# Patient Record
Sex: FEMALE | Race: BLACK OR AFRICAN AMERICAN | NOT HISPANIC OR LATINO | Employment: OTHER | URBAN - METROPOLITAN AREA
[De-identification: names, ages, dates, MRNs, and addresses within clinical notes are randomized per-mention and may not be internally consistent; named-entity substitution may affect disease eponyms.]

---

## 2023-03-08 LAB
APPEARANCE, URINE: ABNORMAL
BACTERIA, URINE: ABNORMAL /HPF
BILIRUBIN, URINE: NEGATIVE
BLOOD, URINE: ABNORMAL
COLOR, URINE: ABNORMAL
GLUCOSE, URINE: NEGATIVE MG/DL
KETONES, URINE: NEGATIVE MG/DL
LEUKOCYTE ESTERASE, URINE: ABNORMAL
MUCUS, URINE: ABNORMAL /LPF
NITRITE, URINE: NEGATIVE
PH, URINE: 5 (ref 5–8)
PROTEIN, URINE: ABNORMAL MG/DL
RBC, URINE: 45 /HPF (ref 0–5)
SPECIFIC GRAVITY, URINE: 1.02 (ref 1–1.03)
SQUAMOUS EPITHELIAL CELLS, URINE: 1 /HPF
UROBILINOGEN, URINE: <2 MG/DL (ref 0–1.9)
WBC CLUMPS, URINE: ABNORMAL /HPF
WBC, URINE: >182 /HPF (ref 0–5)

## 2023-03-10 LAB — URINE CULTURE: ABNORMAL

## 2023-03-13 DIAGNOSIS — R39.9 URINARY SYMPTOM OR SIGN: ICD-10-CM

## 2023-03-23 DIAGNOSIS — I10 HYPERTENSION, UNSPECIFIED TYPE: Primary | ICD-10-CM

## 2023-03-23 RX ORDER — AMLODIPINE BESYLATE 5 MG/1
TABLET ORAL
Qty: 90 TABLET | Refills: 0 | Status: SHIPPED | OUTPATIENT
Start: 2023-03-23 | End: 2023-03-31 | Stop reason: SDUPTHER

## 2023-03-31 DIAGNOSIS — I10 HYPERTENSION, UNSPECIFIED TYPE: ICD-10-CM

## 2023-03-31 RX ORDER — ATORVASTATIN CALCIUM 40 MG/1
40 TABLET, FILM COATED ORAL NIGHTLY
COMMUNITY
End: 2023-03-31 | Stop reason: SDUPTHER

## 2023-03-31 RX ORDER — ATORVASTATIN CALCIUM 40 MG/1
40 TABLET, FILM COATED ORAL NIGHTLY
Qty: 90 TABLET | Refills: 0 | Status: SHIPPED | OUTPATIENT
Start: 2023-03-31 | End: 2023-06-06

## 2023-03-31 RX ORDER — AMLODIPINE BESYLATE 5 MG/1
TABLET ORAL
Qty: 90 TABLET | Refills: 0 | Status: SHIPPED | OUTPATIENT
Start: 2023-03-31 | End: 2023-08-10 | Stop reason: ALTCHOICE

## 2023-04-27 PROBLEM — G95.9 CERVICAL MYELOPATHY (MULTI): Status: ACTIVE | Noted: 2023-04-27

## 2023-04-27 PROBLEM — K80.20 SYMPTOMATIC CHOLELITHIASIS: Status: ACTIVE | Noted: 2023-04-27

## 2023-04-27 PROBLEM — D64.9 ABSOLUTE ANEMIA: Status: ACTIVE | Noted: 2023-04-27

## 2023-04-27 PROBLEM — R01.1 HEART MURMUR: Status: ACTIVE | Noted: 2023-04-27

## 2023-04-27 PROBLEM — K91.2 POSTSURGICAL MALABSORPTION (HHS-HCC): Status: ACTIVE | Noted: 2023-04-27

## 2023-04-27 PROBLEM — M54.2 NECK PAIN: Status: ACTIVE | Noted: 2023-04-27

## 2023-04-27 PROBLEM — H33.103 BILATERAL RETINOSCHISIS: Status: ACTIVE | Noted: 2023-04-27

## 2023-04-27 PROBLEM — Z98.84 GASTRIC BYPASS STATUS FOR OBESITY: Status: ACTIVE | Noted: 2023-04-27

## 2023-04-27 PROBLEM — K21.9 GASTRIC REFLUX: Status: ACTIVE | Noted: 2023-04-27

## 2023-04-27 PROBLEM — R20.2 PARESTHESIA OF LOWER EXTREMITY: Status: ACTIVE | Noted: 2023-04-27

## 2023-04-27 PROBLEM — M48.062 LUMBAR STENOSIS WITH NEUROGENIC CLAUDICATION: Status: ACTIVE | Noted: 2023-04-27

## 2023-04-27 PROBLEM — N18.30 CHRONIC RENAL DISEASE, STAGE III (MULTI): Status: ACTIVE | Noted: 2023-04-27

## 2023-04-27 PROBLEM — I10 HYPERTENSION: Status: ACTIVE | Noted: 2023-04-27

## 2023-04-27 PROBLEM — M17.12 ARTHRITIS OF LEFT KNEE: Status: ACTIVE | Noted: 2023-04-27

## 2023-04-27 PROBLEM — R10.13 DYSPEPSIA: Status: ACTIVE | Noted: 2023-04-27

## 2023-04-27 PROBLEM — G95.9: Status: ACTIVE | Noted: 2023-04-27

## 2023-04-27 PROBLEM — N20.0 CALCULUS OF KIDNEY: Status: ACTIVE | Noted: 2023-04-27

## 2023-04-27 PROBLEM — G56.00 CARPAL TUNNEL SYNDROME: Status: ACTIVE | Noted: 2023-04-27

## 2023-04-27 PROBLEM — M85.80 OSTEOPENIA: Status: ACTIVE | Noted: 2023-04-27

## 2023-04-27 PROBLEM — F32.A DEPRESSION: Status: ACTIVE | Noted: 2023-04-27

## 2023-04-27 PROBLEM — M17.9 OSTEOARTHRITIS OF KNEE: Status: ACTIVE | Noted: 2023-04-27

## 2023-04-27 PROBLEM — N32.81 OAB (OVERACTIVE BLADDER): Status: ACTIVE | Noted: 2023-04-27

## 2023-04-27 PROBLEM — R10.11 CHRONIC RIGHT UPPER QUADRANT PAIN: Status: ACTIVE | Noted: 2023-04-27

## 2023-04-27 PROBLEM — R63.5 WEIGHT GAIN FOLLOWING GASTRIC BYPASS SURGERY: Status: ACTIVE | Noted: 2023-04-27

## 2023-04-27 PROBLEM — K63.5 BENIGN COLON POLYP: Status: ACTIVE | Noted: 2023-04-27

## 2023-04-27 PROBLEM — H10.13 ALLERGIC CONJUNCTIVITIS OF BOTH EYES: Status: ACTIVE | Noted: 2023-04-27

## 2023-04-27 PROBLEM — M43.10 ACQUIRED SPONDYLOLISTHESIS: Status: ACTIVE | Noted: 2023-04-27

## 2023-04-27 PROBLEM — Z96.1 PSEUDOPHAKIA OF BOTH EYES: Status: ACTIVE | Noted: 2023-04-27

## 2023-04-27 PROBLEM — M48.00 SPINAL STENOSIS: Status: ACTIVE | Noted: 2023-04-27

## 2023-04-27 PROBLEM — N39.41 URGE INCONTINENCE OF URINE: Status: ACTIVE | Noted: 2023-04-27

## 2023-04-27 PROBLEM — N81.89 PELVIC FLOOR WEAKNESS: Status: ACTIVE | Noted: 2023-04-27

## 2023-04-27 PROBLEM — Z98.84 WEIGHT GAIN FOLLOWING GASTRIC BYPASS SURGERY: Status: ACTIVE | Noted: 2023-04-27

## 2023-04-27 PROBLEM — E05.00 GOITER WITH HYPERTHYROIDISM: Status: ACTIVE | Noted: 2023-04-27

## 2023-04-27 PROBLEM — E55.9 VITAMIN D DEFICIENCY: Status: ACTIVE | Noted: 2023-04-27

## 2023-04-27 PROBLEM — D50.9 IRON DEFICIENCY ANEMIA: Status: ACTIVE | Noted: 2023-04-27

## 2023-04-27 PROBLEM — R53.83 FATIGUE: Status: ACTIVE | Noted: 2023-04-27

## 2023-04-27 PROBLEM — R73.9 BORDERLINE HYPERGLYCEMIA: Status: ACTIVE | Noted: 2023-04-27

## 2023-04-27 PROBLEM — Z98.1 STATUS POST LUMBAR SPINAL FUSION: Status: ACTIVE | Noted: 2023-04-27

## 2023-04-27 PROBLEM — N95.2 VAGINAL ATROPHY: Status: ACTIVE | Noted: 2023-04-27

## 2023-04-27 PROBLEM — F32.9 MAJOR DEPRESSIVE DISORDER WITH SINGLE EPISODE: Status: ACTIVE | Noted: 2023-04-27

## 2023-04-27 PROBLEM — M54.50 LOW BACK PAIN: Status: ACTIVE | Noted: 2023-04-27

## 2023-04-27 PROBLEM — M54.16 CHRONIC LUMBAR RADICULOPATHY: Status: ACTIVE | Noted: 2023-04-27

## 2023-04-27 PROBLEM — R10.13 ABDOMINAL PAIN, EPIGASTRIC: Status: ACTIVE | Noted: 2023-04-27

## 2023-04-27 PROBLEM — M81.0 OSTEOPOROSIS: Status: ACTIVE | Noted: 2023-04-27

## 2023-04-27 PROBLEM — E78.5 DYSLIPIDEMIA: Status: ACTIVE | Noted: 2023-04-27

## 2023-04-27 PROBLEM — N39.0 RECURRENT UTI: Status: ACTIVE | Noted: 2023-04-27

## 2023-04-27 PROBLEM — G89.29 CHRONIC RIGHT UPPER QUADRANT PAIN: Status: ACTIVE | Noted: 2023-04-27

## 2023-04-27 PROBLEM — R20.0 HAND NUMBNESS: Status: ACTIVE | Noted: 2023-04-27

## 2023-04-27 PROBLEM — T50.905A DRUG REACTION: Status: ACTIVE | Noted: 2023-04-27

## 2023-04-27 PROBLEM — E66.01 MORBID OBESITY (MULTI): Status: ACTIVE | Noted: 2023-04-27

## 2023-04-27 PROBLEM — M25.562 LEFT KNEE PAIN: Status: ACTIVE | Noted: 2023-04-27

## 2023-04-27 RX ORDER — L. ACIDOPHILUS/L.BULGARICUS 1MM CELL
TABLET ORAL 2 TIMES WEEKLY
COMMUNITY
Start: 2022-08-26

## 2023-04-27 RX ORDER — ESTRADIOL 0.1 MG/G
CREAM VAGINAL AS NEEDED
COMMUNITY
Start: 2022-04-28 | End: 2024-02-13 | Stop reason: WASHOUT

## 2023-04-27 RX ORDER — OMEPRAZOLE 20 MG/1
1 CAPSULE, DELAYED RELEASE ORAL DAILY
COMMUNITY
Start: 2023-01-04

## 2023-04-27 RX ORDER — AMLODIPINE BESYLATE 10 MG/1
10 TABLET ORAL DAILY
COMMUNITY
End: 2023-11-08 | Stop reason: SDUPTHER

## 2023-04-27 RX ORDER — AZELASTINE HYDROCHLORIDE 0.5 MG/ML
1 SOLUTION/ DROPS OPHTHALMIC AS NEEDED
COMMUNITY
Start: 2023-02-27 | End: 2023-11-27 | Stop reason: SDUPTHER

## 2023-04-27 RX ORDER — METOPROLOL TARTRATE 75 MG/1
75 TABLET, FILM COATED ORAL DAILY
COMMUNITY
End: 2023-05-01 | Stop reason: SDUPTHER

## 2023-04-27 RX ORDER — DULOXETIN HYDROCHLORIDE 60 MG/1
120 CAPSULE, DELAYED RELEASE ORAL DAILY
COMMUNITY
End: 2023-06-07 | Stop reason: SDUPTHER

## 2023-04-27 RX ORDER — GUAIFENESIN AND PHENYLEPHRINE HCL 400; 10 MG/1; MG/1
1 TABLET ORAL DAILY
COMMUNITY
Start: 2020-06-05

## 2023-04-27 RX ORDER — FERROUS SULFATE 325(65) MG
1 TABLET ORAL 2 TIMES WEEKLY
COMMUNITY

## 2023-04-27 RX ORDER — CHOLECALCIFEROL (VITAMIN D3) 25 MCG
25 TABLET ORAL DAILY
COMMUNITY

## 2023-05-01 ENCOUNTER — OFFICE VISIT (OUTPATIENT)
Dept: PRIMARY CARE | Facility: CLINIC | Age: 73
End: 2023-05-01
Payer: MEDICARE

## 2023-05-01 ENCOUNTER — LAB (OUTPATIENT)
Dept: LAB | Facility: LAB | Age: 73
End: 2023-05-01
Payer: MEDICARE

## 2023-05-01 VITALS
TEMPERATURE: 96.7 F | BODY MASS INDEX: 46.09 KG/M2 | SYSTOLIC BLOOD PRESSURE: 120 MMHG | WEIGHT: 252 LBS | DIASTOLIC BLOOD PRESSURE: 74 MMHG

## 2023-05-01 DIAGNOSIS — N39.0 RECURRENT UTI: Primary | ICD-10-CM

## 2023-05-01 DIAGNOSIS — M17.0 OSTEOARTHRITIS OF BOTH KNEES, UNSPECIFIED OSTEOARTHRITIS TYPE: Primary | ICD-10-CM

## 2023-05-01 DIAGNOSIS — E55.9 VITAMIN D DEFICIENCY: ICD-10-CM

## 2023-05-01 DIAGNOSIS — I10 PRIMARY HYPERTENSION: ICD-10-CM

## 2023-05-01 DIAGNOSIS — N39.0 RECURRENT UTI: ICD-10-CM

## 2023-05-01 DIAGNOSIS — M17.0 OSTEOARTHRITIS OF BOTH KNEES, UNSPECIFIED OSTEOARTHRITIS TYPE: ICD-10-CM

## 2023-05-01 LAB
ALANINE AMINOTRANSFERASE (SGPT) (U/L) IN SER/PLAS: 20 U/L (ref 7–45)
ALBUMIN (G/DL) IN SER/PLAS: 4 G/DL (ref 3.4–5)
ALKALINE PHOSPHATASE (U/L) IN SER/PLAS: 130 U/L (ref 33–136)
ANION GAP IN SER/PLAS: 11 MMOL/L (ref 10–20)
APPEARANCE, URINE: ABNORMAL
ASPARTATE AMINOTRANSFERASE (SGOT) (U/L) IN SER/PLAS: 23 U/L (ref 9–39)
BACTERIA, URINE: ABNORMAL /HPF
BILIRUBIN TOTAL (MG/DL) IN SER/PLAS: 0.7 MG/DL (ref 0–1.2)
BILIRUBIN, URINE: NEGATIVE
BLOOD, URINE: ABNORMAL
CALCIDIOL (25 OH VITAMIN D3) (NG/ML) IN SER/PLAS: 38 NG/ML
CALCIUM (MG/DL) IN SER/PLAS: 9.5 MG/DL (ref 8.6–10.6)
CARBON DIOXIDE, TOTAL (MMOL/L) IN SER/PLAS: 28 MMOL/L (ref 21–32)
CHLORIDE (MMOL/L) IN SER/PLAS: 108 MMOL/L (ref 98–107)
CHOLESTEROL (MG/DL) IN SER/PLAS: 134 MG/DL (ref 0–199)
CHOLESTEROL IN HDL (MG/DL) IN SER/PLAS: 61.3 MG/DL
CHOLESTEROL/HDL RATIO: 2.2
COLOR, URINE: YELLOW
CREATININE (MG/DL) IN SER/PLAS: 0.91 MG/DL (ref 0.5–1.05)
ERYTHROCYTE DISTRIBUTION WIDTH (RATIO) BY AUTOMATED COUNT: 14.2 % (ref 11.5–14.5)
ERYTHROCYTE MEAN CORPUSCULAR HEMOGLOBIN CONCENTRATION (G/DL) BY AUTOMATED: 31 G/DL (ref 32–36)
ERYTHROCYTE MEAN CORPUSCULAR VOLUME (FL) BY AUTOMATED COUNT: 88 FL (ref 80–100)
ERYTHROCYTES (10*6/UL) IN BLOOD BY AUTOMATED COUNT: 4.5 X10E12/L (ref 4–5.2)
ESTIMATED AVERAGE GLUCOSE FOR HBA1C: 126 MG/DL
GFR FEMALE: 67 ML/MIN/1.73M2
GLUCOSE (MG/DL) IN SER/PLAS: 100 MG/DL (ref 74–99)
GLUCOSE, URINE: NEGATIVE MG/DL
HEMATOCRIT (%) IN BLOOD BY AUTOMATED COUNT: 39.7 % (ref 36–46)
HEMOGLOBIN (G/DL) IN BLOOD: 12.3 G/DL (ref 12–16)
HEMOGLOBIN A1C/HEMOGLOBIN TOTAL IN BLOOD: 6 %
KETONES, URINE: NEGATIVE MG/DL
LDL: 58 MG/DL (ref 0–99)
LEUKOCYTE ESTERASE, URINE: ABNORMAL
LEUKOCYTES (10*3/UL) IN BLOOD BY AUTOMATED COUNT: 7.3 X10E9/L (ref 4.4–11.3)
NITRITE, URINE: NEGATIVE
NRBC (PER 100 WBCS) BY AUTOMATED COUNT: 0 /100 WBC (ref 0–0)
PH, URINE: 5 (ref 5–8)
PLATELETS (10*3/UL) IN BLOOD AUTOMATED COUNT: 266 X10E9/L (ref 150–450)
POTASSIUM (MMOL/L) IN SER/PLAS: 4.2 MMOL/L (ref 3.5–5.3)
PROTEIN TOTAL: 7.9 G/DL (ref 6.4–8.2)
PROTEIN, URINE: ABNORMAL MG/DL
RBC, URINE: 55 /HPF (ref 0–5)
SODIUM (MMOL/L) IN SER/PLAS: 143 MMOL/L (ref 136–145)
SPECIFIC GRAVITY, URINE: 1.02 (ref 1–1.03)
SQUAMOUS EPITHELIAL CELLS, URINE: 3 /HPF
THYROTROPIN (MIU/L) IN SER/PLAS BY DETECTION LIMIT <= 0.05 MIU/L: 0.49 MIU/L (ref 0.44–3.98)
TRIGLYCERIDE (MG/DL) IN SER/PLAS: 75 MG/DL (ref 0–149)
UREA NITROGEN (MG/DL) IN SER/PLAS: 19 MG/DL (ref 6–23)
UROBILINOGEN, URINE: <2 MG/DL (ref 0–1.9)
VLDL: 15 MG/DL (ref 0–40)
WBC CLUMPS, URINE: ABNORMAL /HPF
WBC, URINE: 633 /HPF (ref 0–5)

## 2023-05-01 PROCEDURE — 99214 OFFICE O/P EST MOD 30 MIN: CPT | Performed by: INTERNAL MEDICINE

## 2023-05-01 PROCEDURE — 81001 URINALYSIS AUTO W/SCOPE: CPT

## 2023-05-01 PROCEDURE — G0439 PPPS, SUBSEQ VISIT: HCPCS | Performed by: INTERNAL MEDICINE

## 2023-05-01 PROCEDURE — 3078F DIAST BP <80 MM HG: CPT | Performed by: INTERNAL MEDICINE

## 2023-05-01 PROCEDURE — 3074F SYST BP LT 130 MM HG: CPT | Performed by: INTERNAL MEDICINE

## 2023-05-01 PROCEDURE — 83036 HEMOGLOBIN GLYCOSYLATED A1C: CPT

## 2023-05-01 PROCEDURE — 85027 COMPLETE CBC AUTOMATED: CPT

## 2023-05-01 PROCEDURE — 82306 VITAMIN D 25 HYDROXY: CPT

## 2023-05-01 PROCEDURE — 80061 LIPID PANEL: CPT

## 2023-05-01 PROCEDURE — 36415 COLL VENOUS BLD VENIPUNCTURE: CPT

## 2023-05-01 PROCEDURE — 84443 ASSAY THYROID STIM HORMONE: CPT

## 2023-05-01 PROCEDURE — 80053 COMPREHEN METABOLIC PANEL: CPT

## 2023-05-01 RX ORDER — MELOXICAM 15 MG/1
15 TABLET ORAL DAILY
Qty: 30 TABLET | Refills: 11 | Status: SHIPPED | OUTPATIENT
Start: 2023-05-01 | End: 2023-05-01 | Stop reason: ALTCHOICE

## 2023-05-01 RX ORDER — MELOXICAM 15 MG/1
15 TABLET ORAL DAILY
Qty: 30 TABLET | Refills: 11 | Status: SHIPPED | OUTPATIENT
Start: 2023-05-01 | End: 2024-02-28 | Stop reason: SDUPTHER

## 2023-05-01 RX ORDER — METOPROLOL TARTRATE 75 MG/1
75 TABLET, FILM COATED ORAL DAILY
Qty: 90 TABLET | Refills: 1 | Status: SHIPPED | OUTPATIENT
Start: 2023-05-01 | End: 2024-02-15 | Stop reason: SDUPTHER

## 2023-05-01 ASSESSMENT — PATIENT HEALTH QUESTIONNAIRE - PHQ9
SUM OF ALL RESPONSES TO PHQ9 QUESTIONS 1 AND 2: 0
1. LITTLE INTEREST OR PLEASURE IN DOING THINGS: NOT AT ALL
2. FEELING DOWN, DEPRESSED OR HOPELESS: NOT AT ALL

## 2023-05-01 NOTE — PROGRESS NOTES
Subjective   Patient ID: Sarita Morelos is a 73 y.o. female who presents for annual wellness visit, follow up.      HPI   Patient complaints today on low back, knees pain which is chronic, and  1 months of a new onset of wrists pain, R>L ( uses right hand to operate with cane). She fills anxious about her son's wedding and for this reason traveling to [place] tomorrow for the following 3 months.   She states that her abdominal sensitivity became betted in comparison with the last year.   She states that her weight increased for 20 lbs for the last year, mainly because of carb diet and low exercises activity. She denies any SOB, CP, OP, PND, abdominal pain, N/V, dysuria and bowel habitus change.   On review, was noted that the patient denies Hx of  falls,  perform ADLs, and fills depressed( her PQH score is 10). She is up to dates vaccinated, and her colonoscopy due on 2023, and mammogramm is planning on 9/2023. Her labs was ordered.     Review of Systems  12 ROS is  reviewed and all are negative except as  is mentioned above.     Objective   /74   Temp 35.9 °C (96.7 °F)   Wt 114 kg (252 lb)   BMI 46.09 kg/m²     Physical Exam  General: Obese patient, Not confused, lying in the bed comfortably; NAD  HEENT: no lesions, no scleral icterus, moist mucous membranes  Neuro: A&Ox3, no focal neural deficits noted  CV: RRR, nrl S1, S2, no murmur, rubs, or clicks  Resp: bilateral air entry, No wheezing, crackles, or rhonchi  Abdomen: Non distended, + BS, soft, non-tender, no peritoneal signs  Extremities: No LL Edema, + PP  Skin: No jaundice, no lesions  Psychiatric: Appropriate mood and affect. Calm.    Assessment/Plan           SARITA SMITH is a 72 year old female with PMHx significant for HTN, HLD, Chronic Back/Knee Pain, remote Hx SBO in 6/2022, and depression who presents to the clinic today for a annual wellness visit and regular follow up.      #Pain in wrist, R>L  #A new onset  of OA of both wrists  -Ordered UA, CBC, CMP, LP, TSH, Hb A1C, VIT D  -Provided referral to PT, XRAY right wrist, ordered Meloxicam 15 mg daily for 10 days  -Tylenol PRN for pain    #MDD  -PQS is 10 points  -Pt on Duloxetin 120 mg daily  -Provided referral to psychyatry      #Chronic UTIs?  -Currently asymptomatic       #HTN  - BP in office 132/80, patient states BP at home has been in 120-130's  - Current Home Regimen: Amlodipine 10 mg, metoprolol tartrate 75mg BID, pt reports good results with this medication   - Advised patient to continue current regimen and monitoring BP frequently  -Refills for Metoprolol provided      #HLD  - Atorvastatin 40 mg with 2 refills      #Health Maintenance  - Mammogram: 8/22   - DEXA 8/22 normal   - colonoscopy in June 2018 had no polyps , due in 2023             Follow up in 3 months.

## 2023-05-01 NOTE — PROGRESS NOTES
I reviewed with the resident the medical history and the resident’s findings on physical examination.  I discussed with the resident the patient’s diagnosis and concur with the treatment plan as documented in the resident note.     I saw and evaluated the patient. I personally obtained the key and critical portions of the history and physical exam or was physically present for key and critical portions performed by the trainee. I reviewed the trainee's documentation and discussed the patient with the trainee. I agree with the trainee's medical decision making, as documented on the trainee's note.     Marilee Hodges MD

## 2023-05-02 RX ORDER — CIPROFLOXACIN 500 MG/1
500 TABLET ORAL 2 TIMES DAILY
Qty: 10 TABLET | Refills: 0 | Status: SHIPPED | OUTPATIENT
Start: 2023-05-02 | End: 2023-05-07

## 2023-05-03 DIAGNOSIS — N39.0 RECURRENT UTI: Primary | ICD-10-CM

## 2023-05-03 RX ORDER — CIPROFLOXACIN 500 MG/1
500 TABLET ORAL 2 TIMES DAILY
Qty: 10 TABLET | Refills: 0 | Status: SHIPPED | OUTPATIENT
Start: 2023-05-03 | End: 2023-05-08

## 2023-05-03 NOTE — RESULT ENCOUNTER NOTE
Left a message with the patient to start oral antibiotics.  Recheck UA and culture in 2 weeks.  If urine still dirty, I will refer to urology.

## 2023-06-06 DIAGNOSIS — I10 HYPERTENSION, UNSPECIFIED TYPE: ICD-10-CM

## 2023-06-06 DIAGNOSIS — E78.5 DYSLIPIDEMIA: ICD-10-CM

## 2023-06-06 RX ORDER — ATORVASTATIN CALCIUM 40 MG/1
40 TABLET, FILM COATED ORAL NIGHTLY
Qty: 90 TABLET | Refills: 0 | Status: SHIPPED | OUTPATIENT
Start: 2023-06-06 | End: 2023-09-20

## 2023-06-07 DIAGNOSIS — F32.A DEPRESSION, UNSPECIFIED DEPRESSION TYPE: ICD-10-CM

## 2023-06-07 RX ORDER — DULOXETIN HYDROCHLORIDE 60 MG/1
120 CAPSULE, DELAYED RELEASE ORAL DAILY
Qty: 180 CAPSULE | Refills: 0 | Status: SHIPPED | OUTPATIENT
Start: 2023-06-07 | End: 2024-01-09

## 2023-08-08 ENCOUNTER — LAB (OUTPATIENT)
Dept: LAB | Facility: LAB | Age: 73
End: 2023-08-08
Payer: MEDICARE

## 2023-08-08 DIAGNOSIS — R39.9 URINARY SYMPTOM OR SIGN: ICD-10-CM

## 2023-08-08 DIAGNOSIS — N39.0 RECURRENT UTI: ICD-10-CM

## 2023-08-08 PROCEDURE — 87077 CULTURE AEROBIC IDENTIFY: CPT

## 2023-08-08 PROCEDURE — 81001 URINALYSIS AUTO W/SCOPE: CPT

## 2023-08-08 PROCEDURE — 87186 SC STD MICRODIL/AGAR DIL: CPT

## 2023-08-08 PROCEDURE — 87086 URINE CULTURE/COLONY COUNT: CPT

## 2023-08-09 LAB
APPEARANCE, URINE: ABNORMAL
BILIRUBIN, URINE: NEGATIVE
BLOOD, URINE: ABNORMAL
COLOR, URINE: ABNORMAL
GLUCOSE, URINE: NEGATIVE MG/DL
KETONES, URINE: NEGATIVE MG/DL
LEUKOCYTE ESTERASE, URINE: ABNORMAL
MUCUS, URINE: ABNORMAL /LPF
NITRITE, URINE: NEGATIVE
PH, URINE: 5 (ref 5–8)
PROTEIN, URINE: ABNORMAL MG/DL
RBC, URINE: 149 /HPF (ref 0–5)
SPECIFIC GRAVITY, URINE: 1.02 (ref 1–1.03)
SQUAMOUS EPITHELIAL CELLS, URINE: 17 /HPF
UROBILINOGEN, URINE: <2 MG/DL (ref 0–1.9)
WBC CLUMPS, URINE: ABNORMAL /HPF
WBC, URINE: >182 /HPF (ref 0–5)

## 2023-08-10 ENCOUNTER — OFFICE VISIT (OUTPATIENT)
Dept: PRIMARY CARE | Facility: CLINIC | Age: 73
End: 2023-08-10
Payer: MEDICARE

## 2023-08-10 VITALS — SYSTOLIC BLOOD PRESSURE: 130 MMHG | WEIGHT: 257 LBS | DIASTOLIC BLOOD PRESSURE: 80 MMHG | BODY MASS INDEX: 47.01 KG/M2

## 2023-08-10 DIAGNOSIS — E78.5 DYSLIPIDEMIA: Primary | ICD-10-CM

## 2023-08-10 DIAGNOSIS — E66.01 MORBID OBESITY (MULTI): ICD-10-CM

## 2023-08-10 DIAGNOSIS — Z98.84 GASTRIC BYPASS STATUS FOR OBESITY: ICD-10-CM

## 2023-08-10 DIAGNOSIS — R73.03 PREDIABETES: ICD-10-CM

## 2023-08-10 DIAGNOSIS — I10 HYPERTENSION, UNSPECIFIED TYPE: ICD-10-CM

## 2023-08-10 DIAGNOSIS — G95.9 CERVICAL MYELOPATHY (MULTI): ICD-10-CM

## 2023-08-10 DIAGNOSIS — N39.0 RECURRENT UTI: ICD-10-CM

## 2023-08-10 DIAGNOSIS — Z00.00 ANNUAL PHYSICAL EXAM: ICD-10-CM

## 2023-08-10 PROCEDURE — 1126F AMNT PAIN NOTED NONE PRSNT: CPT | Performed by: INTERNAL MEDICINE

## 2023-08-10 PROCEDURE — 3079F DIAST BP 80-89 MM HG: CPT | Performed by: INTERNAL MEDICINE

## 2023-08-10 PROCEDURE — 3075F SYST BP GE 130 - 139MM HG: CPT | Performed by: INTERNAL MEDICINE

## 2023-08-10 PROCEDURE — 1159F MED LIST DOCD IN RCRD: CPT | Performed by: INTERNAL MEDICINE

## 2023-08-10 PROCEDURE — 99214 OFFICE O/P EST MOD 30 MIN: CPT | Performed by: INTERNAL MEDICINE

## 2023-08-10 RX ORDER — CIPROFLOXACIN 500 MG/1
500 TABLET ORAL 2 TIMES DAILY
Qty: 10 TABLET | Refills: 0 | Status: SHIPPED | OUTPATIENT
Start: 2023-08-10 | End: 2023-08-15

## 2023-08-10 ASSESSMENT — ENCOUNTER SYMPTOMS
DYSURIA: 1
RESPIRATORY NEGATIVE: 1
GASTROINTESTINAL NEGATIVE: 1
CONSTITUTIONAL NEGATIVE: 1
CARDIOVASCULAR NEGATIVE: 1

## 2023-08-10 ASSESSMENT — PATIENT HEALTH QUESTIONNAIRE - PHQ9
SUM OF ALL RESPONSES TO PHQ9 QUESTIONS 1 AND 2: 2
2. FEELING DOWN, DEPRESSED OR HOPELESS: SEVERAL DAYS
1. LITTLE INTEREST OR PLEASURE IN DOING THINGS: SEVERAL DAYS

## 2023-08-10 NOTE — PROGRESS NOTES
Subjective   Patient ID: Sarita Morelos is a 73 y.o. female who presents for Follow-up.  HPI    Review of Systems   Constitutional: Negative.    Respiratory: Negative.     Cardiovascular: Negative.    Gastrointestinal: Negative.    Genitourinary:  Positive for dysuria.       Objective   Physical Exam  Neurological:      Mental Status: She is alert.   Psychiatric:         Mood and Affect: Mood normal.         Assessment/Plan   Problem List Items Addressed This Visit       Dyslipidemia - Primary    Gastric bypass status for obesity    Hypertension    Morbid obesity (CMS/HCC)    Relevant Medications    semaglutide 0.25 mg or 0.5 mg (2 mg/3 mL) pen injector    semaglutide 0.25 mg or 0.5 mg (2 mg/3 mL) pen injector    Recurrent UTI    Relevant Medications    ciprofloxacin (Cipro) 500 mg tablet    Prediabetes    Relevant Medications    semaglutide 0.25 mg or 0.5 mg (2 mg/3 mL) pen injector    semaglutide 0.25 mg or 0.5 mg (2 mg/3 mL) pen injector     Other Visit Diagnoses       Annual physical exam        Relevant Orders    BI mammo bilateral screening tomosynthesis          Uti  Start Cipro BID for 5 days  Let us know if not better in 1 week  Drink more water      Obesity with BMI and comorbidities as noted above.  No signs of hypothyroidism, no signs of hypercortisolism.  Contraindications to weight loss: none.  Patient readiness to commit to diet and activity changes: good.  Barriers to weight loss: social factors  The patient is here for obesity.  She has been unable to lose weight despite trials of diet changes and exercise programs.  Starting Ozempic, if covered by insurance  Start with 0.25 weekly  Increase to 0.5 in 4 weeks    Follow up in 3 months    Lab Results   Component Value Date    WBC 7.3 05/01/2023    HGB 12.3 05/01/2023    HCT 39.7 05/01/2023     05/01/2023    CHOL 134 05/01/2023    TRIG 75 05/01/2023    HDL 61.3 05/01/2023    ALT 20 05/01/2023    AST 23 05/01/2023      05/01/2023    K 4.2 05/01/2023     (H) 05/01/2023    CREATININE 0.91 05/01/2023    BUN 19 05/01/2023    CO2 28 05/01/2023    TSH 0.49 05/01/2023    INR 1.1 07/23/2019    HGBA1C 6.0 (A) 05/01/2023     par    MDM  1) COMPLEXITY: MORE THAN 1 STABLE CHRONIC CONDITION ADDRESSED OR 1 ACUTE ILLNESS ADDRESSED   2)DATA: TESTS INTERPRETED AND OR ORDERED, TOOK INDEPENDENT HISTORY OR RECORDS REVIEWED  3)RISK: MODERATE RISK DUE TO NATURE OF MEDICAL CONDITIONS/COMORBIDITY OR MEDICATIONS ORDERED OR SURGICAL OR PROCEDURE REFERRAL         Marilee Hodges MD

## 2023-08-12 LAB — URINE CULTURE: ABNORMAL

## 2023-08-13 DIAGNOSIS — N39.0 RECURRENT UTI: Primary | ICD-10-CM

## 2023-08-13 RX ORDER — LEVOFLOXACIN 500 MG/1
500 TABLET, FILM COATED ORAL DAILY
Qty: 7 TABLET | Refills: 0 | Status: SHIPPED | OUTPATIENT
Start: 2023-08-13 | End: 2023-08-20

## 2023-09-05 ENCOUNTER — TELEPHONE (OUTPATIENT)
Dept: PRIMARY CARE | Facility: CLINIC | Age: 73
End: 2023-09-05
Payer: MEDICARE

## 2023-09-06 DIAGNOSIS — N39.0 RECURRENT UTI: Primary | ICD-10-CM

## 2023-09-06 RX ORDER — LEVOFLOXACIN 500 MG/1
500 TABLET, FILM COATED ORAL DAILY
Qty: 7 TABLET | Refills: 0 | Status: SHIPPED | OUTPATIENT
Start: 2023-09-06 | End: 2023-09-13

## 2023-09-20 DIAGNOSIS — E78.5 DYSLIPIDEMIA: ICD-10-CM

## 2023-09-20 RX ORDER — ATORVASTATIN CALCIUM 40 MG/1
40 TABLET, FILM COATED ORAL NIGHTLY
Qty: 90 TABLET | Refills: 0 | Status: SHIPPED | OUTPATIENT
Start: 2023-09-20 | End: 2023-12-13

## 2023-10-17 ENCOUNTER — APPOINTMENT (OUTPATIENT)
Dept: UROLOGY | Facility: CLINIC | Age: 73
End: 2023-10-17
Payer: MEDICARE

## 2023-10-20 ENCOUNTER — APPOINTMENT (OUTPATIENT)
Dept: OPHTHALMOLOGY | Facility: CLINIC | Age: 73
End: 2023-10-20
Payer: MEDICARE

## 2023-10-26 ENCOUNTER — ANCILLARY PROCEDURE (OUTPATIENT)
Dept: RADIOLOGY | Facility: CLINIC | Age: 73
End: 2023-10-26
Payer: MEDICARE

## 2023-10-26 DIAGNOSIS — Z00.00 ANNUAL PHYSICAL EXAM: ICD-10-CM

## 2023-10-26 PROCEDURE — 77067 SCR MAMMO BI INCL CAD: CPT | Mod: BILATERAL PROCEDURE | Performed by: RADIOLOGY

## 2023-10-26 PROCEDURE — 77067 SCR MAMMO BI INCL CAD: CPT

## 2023-10-26 PROCEDURE — 77063 BREAST TOMOSYNTHESIS BI: CPT | Mod: BILATERAL PROCEDURE | Performed by: RADIOLOGY

## 2023-10-29 NOTE — PROGRESS NOTES
Subjective   Patient ID: Sarita Morelos is a 73 y.o. female who presents for   HPI   73-year-old with recurrent urinary tract infections and acute urinary tract infection, history of mid urethral sling, mixed urinary incontinence, urge predominant, history of Botox and July 2020 InterStim placement, vaginal atrophy, and pelvic floor weakness.         The patient presents for her device interrogation but was unable to connect to the device.    She denies any bowel related complaints, no fecal or flatal incontinence.    She denies any vaginal complaints, no abnormal bleeding or discharge.     She has no other complaints.      From Previous note  73-year-old with recurrent urinary tract infections and acute urinary tract infection, history of mid urethral sling, mixed urinary incontinence, urge predominant, history of Botox and July 2020 InterStim placement, vaginal atrophy, and pelvic floor weakness.     The patient was last seen in April 2022. She reports of having a mass in her pelvis, she denies any draining or bleeding from it. She denies any pain.      She did have an episode of bowel obstruction since our last appointment.     She has since stopped her Myrbetriq therapy and has noted worsening lower urinary tract urgency and frequency complaints. She did last interrogate her InterStim 1 month ago.     She has no other complaints     From previous note  The following visit was performed to telemedicine  72-year-old with recurrent urinary tract infections, history of mid urethral sling, mixed urinary incontinence, urge predominant, with history of Botox and July 2020 InterStim placement by Dr. Rod, with vaginal atrophy, and pelvic floor weakness.     The patient continues her cefdinir, vaginal estrogen therapy, and d-mannose therapy. She has stopped her oxybutynin therapy. Her InterStim was adjusted 12/14/2021 and she continues to note significant improvements in her lower urinary tract urgency  "and frequency complaints. However, she notes daytime urgency roughly every 2-4 hours. She notes 0-1 episodes of nocturia but does have significant urgency associated with this and concerns regarding leaking on the way to the bathroom with this urge. Roughly 7 days ago she increased her Myrbetriq 25 mg to 50 mg. She did feel that last night and the night before she had significant improvements in her nocturia complaints.     She otherwise denies any UTI symptoms.     She has no other complaints.        From previous note  71-year-old presenting as a self-referral with urinary urgency, frequency, incontinence, and history of chronic urinary tract infections and nephrolithiasis.     The patient has a complicated lower urinary tract history. She originally underwent a TVT sling in 2005 with anterior repair with Dr. Dugan. She has undergone multiple ureteroscopy's with laser lithotripsy and extracorporeal shockwave therapy over these last 15 years. Her last stone treatment was in 2015. She is also undergone multiple Botox treatments with Dr. Rod. She has undergone 100 units and most recently in November 2019 200 units. This was complicated by retention with concomitant use of oxybutynin but this resolved. In 2020 the patient underwent successful InterStim placement. She felt that this was working well until the last \"few months\". Over the last few weeks she has noted worsening urgency and frequency up to every 20 to 45 minutes. She has utilized her home InterStim patient controller noting appropriate function and use.     The patient also has a history of chronic urinary tract infections. She states that she was on prophylactic cephalexin use in the past which improved her lower urinary tract symptoms. She has not been taking this for the last 6 to 12 months.     Today's urinalysis is positive for nitrites. She does feel that she has a urinary tract infection. She does have allergies to sulfa and Macrobid.     She " "notes episodic stress urinary incontinence which is worsened with her associated worsening urge and urge related incontinence. She has noted blood in her urine \"a few weeks ago\" but denies any gross hematuria recently.     She denies any vaginal complaints including vaginal bleeding or discharge. She denies any prolapse complaints. She is not sexually active.     She denies any bowel complaints. She denies any fecal or flatal incontinence.     She has no other complaints.      Review of Systems  Constitutional: No fever, No chills and No fatigue.   Eyes: No vision problems and No dryness of the eyes.   ENT: No dry mouth, No hearing loss and No nosebleeds.   Cardiovascular: No chest pain, No palpitations and No orthopnea.   Respiratory: No shortness of breath, No cough and No wheezing.   Gastrointestinal: No abdominal pain, No constipation, No nausea, No diarrhea, No vomiting and No melena.   Genitourinary: As noted in HPI.   Musculoskeletal: No back pain, No myalgias, No muscle weakness, No joint swelling and No leg edema.   Integumentary: No rashes, No skin lesion and No itching.   Neurological: No headache, No numbness and No dizziness.   Psychiatric: No sleep disturbances, No anxiety and No depression.   Endocrine: No hot flashes, No loss of hair and No hirsutism.   Hematologic/Lymphatic: No swollen glands, No tendency for easy bleeding and No tendency for easy bruising.   All other systems have been reviewed and are negative for complaint.        Objective   Physical Exam    Multiple attempts were made to connect to the patient's InterStim without success.  There was no pain or infection at the site of her left buttock.    Assessment/Plan      73-year-old with recurrent urinary tract infections and acute urinary tract infection, history of mid urethral sling, mixed urinary incontinence, urge predominant, history of Botox and July 2020 InterStim placement, vaginal atrophy, and pelvic floor weakness.     #1 we " again discussed the treatment algorithm for the patient's chronic urinary tract infections. She has had 1 UTI in the last 6 months. The patient is allergic to Macrobid and sulfa. She was previously on cefdinir but has since stopped this.. She will continue her d-mannose therapy as well as continuing to push fluids. She does have a history of kidney stones and we discussed not utilizing any cranberry extract tablets at this time. She was provided a new standing order for urinalysis and urine culture. We discussed the possibility of using methenamine therapy in the future.      2. The patient unfortunately has noted worsening lower urinary tract urgency and frequency complaints. Her Myrbetriq therapy is no longer adequate and she has since stopped this.  We have previously discussed that she had previously had success on program 3 and on program 7.  However her InterStim device could not be interrogated today 10/31/2023.  She has previously undergone intradetrusor Botox in 2019 with Dr. Rod and was well controlled with her InterStim placed in July 2020. She has since stopped her oxybutynin therapy due to constipation and dry mouth complaints. We therefore discussed today a trial of Gemtesa therapy we discussed the potential side effects of this medication and stopping immediately should she have any bothersome side effects.  However this was cost prohibitive and we therefore discussed at length today her third line therapeutic options.  We discussed proceeding with repeat 100 units Botox.  She has had significant pain with this in the office and wishes to proceed with this in the operating room and will be scheduled accordingly.  We did discuss her nonfunctioning InterStim and we will readdress this at follow-up visits.     3. we discussed the patient's history of nephrolithiasis. 10/12/2021 upper tract imaging demonstrated an 8 mm nonobstructive right-sided stone. We will continue expectant management of this.    "  4. We discussed her concerns for vaginal mass. She was noted to have a noninfected Bartholin's duct cyst today 8/28/2023. These have \"come and gone\" and we discussed continued expectant management. The patient will proceed with sitz bath's and warm compresses should she note any painful concerns in this area in the future.     5. The patient will be scheduled at her earliest convenience at the [facility] [place] for 100 units Botox.     NAYLA Valdez MD     Scribe Attestation  By signing my name below, I, Nabila Sears, Scribe attest that this documentation has been prepared under the direction and in the presence of Jona Valdez MD. All medical record entries made by the Scribe were at my direction or personally dictated by me. I have reviewed the chart and agree that the record accurately reflects my personal performance of the history, physical exam, discussion and plan.    "

## 2023-10-30 ENCOUNTER — APPOINTMENT (OUTPATIENT)
Dept: GASTROENTEROLOGY | Facility: CLINIC | Age: 73
End: 2023-10-30
Payer: MEDICARE

## 2023-10-30 NOTE — PROGRESS NOTES
Subjective     History of Present Illness   Sarita Morelos is a 73 y.o. female with PMHx of HTN, HLD, morbid obesity, RYGB, CKD3, pelvic floor weakness, depression, anxiety, spinal stenosis who presents to GI clinic for further evaluation of ???    Past Medical History  As per HPI.     Social History  she  reports that she has never smoked. She has never used smokeless tobacco.     Family History  her family history is not on file.     Review of Systems  Review of Systems    Allergies  Allergies   Allergen Reactions    Gabapentin Swelling    Nitrofurantoin Swelling    Sulfamethoxazole-Trimethoprim Unknown and Itching       Medications  Current Outpatient Medications   Medication Instructions    amLODIPine (NORVASC) 10 mg, oral, Daily    atorvastatin (LIPITOR) 40 mg, oral, Nightly    azelastine (Optivar) 0.05 % ophthalmic solution 1 drop, Both Eyes, 2 times daily    cholecalciferol (VITAMIN D-3) 25 mcg, oral, Daily    DULoxetine (CYMBALTA) 120 mg, oral, Daily    estradiol (Estrace) 0.01 % (0.1 mg/gram) vaginal cream vaginal, Daily    ferrous sulfate 325 (65 Fe) MG tablet 1 tablet, oral, Daily    Lactobacillus acidoph-L.bulgar 1 million cell tablet tablet oral    meloxicam (MOBIC) 15 mg, oral, Daily    metoprolol tartrate (LOPRESSOR) 75 mg, oral, Daily    multivit with minerals/lutein (MULTIVITAMIN 50 PLUS ORAL) 1 tablet, oral, Daily    omeprazole (PriLOSEC) 20 mg DR capsule 1 capsule, oral, Daily    semaglutide 0.25 mg, subcutaneous, Weekly    semaglutide 0.5 mg, subcutaneous, Weekly    turmeric root extract 500 mg capsule 1 capsule, oral, Daily        Objective     Visit Vitals  Smoking Status Never       Physical Exam      Lab Results   Component Value Date    WBC 8.0 09/12/2023    WBC 7.3 05/01/2023    HGB 13.2 09/12/2023    HGB 12.3 05/01/2023    HCT 41.5 09/12/2023    HCT 39.7 05/01/2023    MCV 87 09/12/2023    MCV 88 05/01/2023     09/12/2023     05/01/2023     Lab Results    Component Value Date     09/12/2023     05/01/2023    K 4.2 09/12/2023    K 4.2 05/01/2023     09/12/2023     (H) 05/01/2023    CO2 25 09/12/2023    CO2 28 05/01/2023    BUN 19 09/12/2023    BUN 19 05/01/2023    CREATININE 1.09 (H) 09/12/2023    CREATININE 0.91 05/01/2023    CALCIUM 9.5 09/12/2023    CALCIUM 9.5 05/01/2023    PROT 8.5 (H) 09/12/2023    PROT 7.9 05/01/2023    BILITOT 0.8 09/12/2023    BILITOT 0.7 05/01/2023    ALKPHOS 141 (H) 09/12/2023    ALKPHOS 130 05/01/2023    ALT 15 09/12/2023    ALT 20 05/01/2023    AST 18 09/12/2023    AST 23 05/01/2023    GLUCOSE 96 09/12/2023    GLUCOSE 100 (H) 05/01/2023       Recent Imaging  No results found.    Assessment/Plan    Sarita Morelos is a 73 y.o. female who presents to GI clinic for ***.    No problem-specific Assessment & Plan notes found for this encounter.         Sam Sanchez MD

## 2023-10-31 ENCOUNTER — OFFICE VISIT (OUTPATIENT)
Dept: UROLOGY | Facility: CLINIC | Age: 73
End: 2023-10-31
Payer: MEDICARE

## 2023-10-31 DIAGNOSIS — N39.41 URGE INCONTINENCE OF URINE: Primary | ICD-10-CM

## 2023-10-31 DIAGNOSIS — N95.2 VAGINAL ATROPHY: ICD-10-CM

## 2023-10-31 DIAGNOSIS — N39.0 RECURRENT UTI: ICD-10-CM

## 2023-10-31 PROCEDURE — 99213 OFFICE O/P EST LOW 20 MIN: CPT | Performed by: OBSTETRICS & GYNECOLOGY

## 2023-10-31 PROCEDURE — 95972 ALYS CPLX SP/PN NPGT W/PRGRM: CPT | Performed by: OBSTETRICS & GYNECOLOGY

## 2023-10-31 PROCEDURE — 3075F SYST BP GE 130 - 139MM HG: CPT | Performed by: OBSTETRICS & GYNECOLOGY

## 2023-10-31 PROCEDURE — 99213 OFFICE O/P EST LOW 20 MIN: CPT | Mod: PN | Performed by: OBSTETRICS & GYNECOLOGY

## 2023-10-31 PROCEDURE — 1126F AMNT PAIN NOTED NONE PRSNT: CPT | Performed by: OBSTETRICS & GYNECOLOGY

## 2023-10-31 PROCEDURE — 1159F MED LIST DOCD IN RCRD: CPT | Performed by: OBSTETRICS & GYNECOLOGY

## 2023-10-31 PROCEDURE — 1160F RVW MEDS BY RX/DR IN RCRD: CPT | Performed by: OBSTETRICS & GYNECOLOGY

## 2023-10-31 PROCEDURE — 1036F TOBACCO NON-USER: CPT | Performed by: OBSTETRICS & GYNECOLOGY

## 2023-10-31 PROCEDURE — 95972 ALYS CPLX SP/PN NPGT W/PRGRM: CPT | Mod: PN | Performed by: OBSTETRICS & GYNECOLOGY

## 2023-10-31 PROCEDURE — 3079F DIAST BP 80-89 MM HG: CPT | Performed by: OBSTETRICS & GYNECOLOGY

## 2023-10-31 RX ORDER — SODIUM CHLORIDE, SODIUM LACTATE, POTASSIUM CHLORIDE, CALCIUM CHLORIDE 600; 310; 30; 20 MG/100ML; MG/100ML; MG/100ML; MG/100ML
100 INJECTION, SOLUTION INTRAVENOUS CONTINUOUS
Status: CANCELLED | OUTPATIENT
Start: 2023-10-31

## 2023-11-06 ENCOUNTER — TELEPHONE (OUTPATIENT)
Dept: PREADMISSION TESTING | Facility: HOSPITAL | Age: 73
End: 2023-11-06
Payer: MEDICARE

## 2023-11-08 ENCOUNTER — OFFICE VISIT (OUTPATIENT)
Dept: PRIMARY CARE | Facility: CLINIC | Age: 73
End: 2023-11-08
Payer: MEDICARE

## 2023-11-08 VITALS
HEART RATE: 84 BPM | WEIGHT: 250 LBS | SYSTOLIC BLOOD PRESSURE: 130 MMHG | DIASTOLIC BLOOD PRESSURE: 79 MMHG | BODY MASS INDEX: 45.73 KG/M2

## 2023-11-08 DIAGNOSIS — M25.511 ACUTE PAIN OF RIGHT SHOULDER: ICD-10-CM

## 2023-11-08 DIAGNOSIS — I10 HYPERTENSION, UNSPECIFIED TYPE: ICD-10-CM
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
## 2023-11-08 DIAGNOSIS — N18.30 STAGE 3 CHRONIC KIDNEY DISEASE, UNSPECIFIED WHETHER STAGE 3A OR 3B CKD (MULTI): ICD-10-CM

## 2023-11-08 DIAGNOSIS — E78.5 DYSLIPIDEMIA: ICD-10-CM

## 2023-11-08 DIAGNOSIS — K83.8 DILATED CBD, ACQUIRED: ICD-10-CM

## 2023-11-08 DIAGNOSIS — E11.22 TYPE 2 DIABETES MELLITUS WITH CHRONIC KIDNEY DISEASE, WITHOUT LONG-TERM CURRENT USE OF INSULIN, UNSPECIFIED CKD STAGE (MULTI): ICD-10-CM

## 2023-11-08 DIAGNOSIS — Z12.11 COLON CANCER SCREENING: Primary | ICD-10-CM

## 2023-11-08 DIAGNOSIS — N39.41 URGE INCONTINENCE OF URINE: ICD-10-CM

## 2023-11-08 DIAGNOSIS — Z23 FLU VACCINE NEED: ICD-10-CM

## 2023-11-08 DIAGNOSIS — E66.01 MORBID OBESITY (MULTI): ICD-10-CM
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
## 2023-11-08 PROCEDURE — 90662 IIV NO PRSV INCREASED AG IM: CPT | Performed by: INTERNAL MEDICINE

## 2023-11-08 PROCEDURE — 1126F AMNT PAIN NOTED NONE PRSNT: CPT | Performed by: INTERNAL MEDICINE

## 2023-11-08 PROCEDURE — 3078F DIAST BP <80 MM HG: CPT | Performed by: INTERNAL MEDICINE

## 2023-11-08 PROCEDURE — 1036F TOBACCO NON-USER: CPT | Performed by: INTERNAL MEDICINE

## 2023-11-08 PROCEDURE — 90471 IMMUNIZATION ADMIN: CPT | Performed by: INTERNAL MEDICINE

## 2023-11-08 PROCEDURE — 1159F MED LIST DOCD IN RCRD: CPT | Performed by: INTERNAL MEDICINE

## 2023-11-08 PROCEDURE — 99213 OFFICE O/P EST LOW 20 MIN: CPT | Performed by: INTERNAL MEDICINE

## 2023-11-08 PROCEDURE — 1160F RVW MEDS BY RX/DR IN RCRD: CPT | Performed by: INTERNAL MEDICINE

## 2023-11-08 PROCEDURE — 3075F SYST BP GE 130 - 139MM HG: CPT | Performed by: INTERNAL MEDICINE

## 2023-11-08 PROCEDURE — 3044F HG A1C LEVEL LT 7.0%: CPT | Performed by: INTERNAL MEDICINE

## 2023-11-08 RX ORDER — DICLOFENAC SODIUM 10 MG/G
4 GEL TOPICAL 4 TIMES DAILY
Qty: 100 G | Refills: 1 | Status: SHIPPED | OUTPATIENT
Start: 2023-11-08 | End: 2023-12-28

## 2023-11-08 RX ORDER — AMLODIPINE BESYLATE 10 MG/1
10 TABLET ORAL DAILY
Qty: 30 TABLET | Refills: 3 | Status: SHIPPED | OUTPATIENT
Start: 2023-11-08 | End: 2024-01-09

## 2023-11-08 ASSESSMENT — PATIENT HEALTH QUESTIONNAIRE - PHQ9
SUM OF ALL RESPONSES TO PHQ9 QUESTIONS 1 AND 2: 2
1. LITTLE INTEREST OR PLEASURE IN DOING THINGS: SEVERAL DAYS
2. FEELING DOWN, DEPRESSED OR HOPELESS: SEVERAL DAYS

## 2023-11-08 NOTE — PROGRESS NOTES
Subjective   Patient ID: Sarita Morelos is a 73 y.o. female with a hx of HTN, dyslipidemia, obesity, gastric bypass surgery who presents for follow up. States she is doing fine and is on Ozempic 0.5mg weekly. Denies any side effects from the medication. States she had a fall on 10/31 and 11/1 on her deck when she was trying to grab something from the floor. States she fell on the right shoulder and has been hurting since then, however she is able to move around her arm fairly well. States her BP after her fall was normal in 120s/80s. Denies associated n/v, near syncope or syncope, chest pain, SOB, abdominal pain, changes to urination or BM.    Objective   There were no vitals taken for this visit.    Physical Exam  Constitutional: Appears stated age. In NAD.   HEENT: NC/AT, EOMI, clear sclera, moist mucous membranes  CV: RRR, No M/R/G  PULM: CTAB, no coughing or wheezing  ABDOMEN: Soft, NT/ND. No TTP. + BSx4.  SKIN: Normal Color, Warm, Dry, No Rashes   EXTREMITIES: Non-Tender, Full ROM, No Pedal Edema  NEURO: A&O x 4, nonfocal neurological exam.  PSYCH: Normal Mood & Behavior    Assessment/Plan       #Mechanical fall  #Right shoulder pain likely 2/2 fall or rotator cuff injury  - Ordered Right shoulder XRAY  - Diclofenac gel PRN  - Consider orthopedic surgery referral or physical therapy referral      #HTN  - Well controlled at home. BP in office 130/79  - Amlodipine 5mg daily    #HLD  - Stable  - Atorvastatin 40mg daily    #Morbid obesity  #Hx of gastric bypass surgery  - Continue Ozempic 0.5 weekly    #Urinary incontenence  - Follows up with urology  - Has Botulinum injecions scheduled on Nov 14    #HM  -Requisition for colonoscopy provided  - Flu shot given 11/8/23    Follow up in February

## 2023-11-09 ENCOUNTER — ANCILLARY PROCEDURE (OUTPATIENT)
Dept: RADIOLOGY | Facility: CLINIC | Age: 73
End: 2023-11-09
Payer: MEDICARE
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
## 2023-11-09 PROCEDURE — 73030 X-RAY EXAM OF SHOULDER: CPT | Mod: RIGHT SIDE | Performed by: RADIOLOGY

## 2023-11-09 PROCEDURE — 73030 X-RAY EXAM OF SHOULDER: CPT | Mod: RT

## 2023-11-09 NOTE — PROGRESS NOTES
I saw and evaluated the patient. I personally obtained the key and critical portions of the history and physical exam or was physically present for key and critical portions performed by the resident/fellow. I reviewed the resident/fellow's documentation and discussed the patient with the resident/fellow. I agree with the resident/fellow's medical decision making as documented in the note.    Marilee Hodges MD

## 2023-11-10 ENCOUNTER — TELEMEDICINE CLINICAL SUPPORT (OUTPATIENT)
Dept: PREADMISSION TESTING | Facility: HOSPITAL | Age: 73
End: 2023-11-10
Payer: MEDICARE

## 2023-11-10 RX ORDER — IBUPROFEN 200 MG
200 TABLET ORAL EVERY 6 HOURS PRN
COMMUNITY
End: 2024-02-13 | Stop reason: WASHOUT

## 2023-11-10 RX ORDER — DEXTROMETHORPHAN HYDROBROMIDE, GUAIFENESIN 5; 100 MG/5ML; MG/5ML
650 LIQUID ORAL EVERY 8 HOURS PRN
COMMUNITY

## 2023-11-11 ENCOUNTER — HOSPITAL ENCOUNTER (OUTPATIENT)
Dept: RADIOLOGY | Facility: HOSPITAL | Age: 73
Discharge: HOME | End: 2023-11-11
Payer: MEDICARE

## 2023-11-11 DIAGNOSIS — K83.8 DILATED CBD, ACQUIRED: ICD-10-CM

## 2023-11-13 ENCOUNTER — PRE-ADMISSION TESTING (OUTPATIENT)
Dept: PREADMISSION TESTING | Facility: HOSPITAL | Age: 73
End: 2023-11-13
Payer: COMMERCIAL

## 2023-11-13 ENCOUNTER — LAB (OUTPATIENT)
Dept: LAB | Facility: LAB | Age: 73
End: 2023-11-13
Payer: COMMERCIAL

## 2023-11-13 ENCOUNTER — TELEPHONE (OUTPATIENT)
Dept: UROLOGY | Facility: CLINIC | Age: 73
End: 2023-11-13

## 2023-11-13 VITALS
DIASTOLIC BLOOD PRESSURE: 67 MMHG | WEIGHT: 247.58 LBS | HEART RATE: 81 BPM | RESPIRATION RATE: 18 BRPM | OXYGEN SATURATION: 97 % | HEIGHT: 61 IN | BODY MASS INDEX: 46.74 KG/M2 | TEMPERATURE: 97.7 F | SYSTOLIC BLOOD PRESSURE: 144 MMHG

## 2023-11-13 DIAGNOSIS — N39.0 RECURRENT UTI: ICD-10-CM

## 2023-11-13 DIAGNOSIS — Z01.818 PREOP TESTING: Primary | ICD-10-CM

## 2023-11-13 LAB
APPEARANCE UR: ABNORMAL
BACTERIA #/AREA URNS AUTO: ABNORMAL /HPF
BILIRUB UR STRIP.AUTO-MCNC: NEGATIVE MG/DL
COLOR UR: YELLOW
GLUCOSE UR STRIP.AUTO-MCNC: NEGATIVE MG/DL
KETONES UR STRIP.AUTO-MCNC: NEGATIVE MG/DL
LEUKOCYTE ESTERASE UR QL STRIP.AUTO: ABNORMAL
MUCOUS THREADS #/AREA URNS AUTO: ABNORMAL /LPF
NITRITE UR QL STRIP.AUTO: POSITIVE
PH UR STRIP.AUTO: 5 [PH]
PROT UR STRIP.AUTO-MCNC: ABNORMAL MG/DL
RBC # UR STRIP.AUTO: NEGATIVE /UL
RBC #/AREA URNS AUTO: ABNORMAL /HPF
SP GR UR STRIP.AUTO: 1.02
SQUAMOUS #/AREA URNS AUTO: ABNORMAL /HPF
UROBILINOGEN UR STRIP.AUTO-MCNC: 2 MG/DL
WBC #/AREA URNS AUTO: >50 /HPF
WBC CLUMPS #/AREA URNS AUTO: ABNORMAL /HPF

## 2023-11-13 PROCEDURE — 99204 OFFICE O/P NEW MOD 45 MIN: CPT | Performed by: PHYSICIAN ASSISTANT

## 2023-11-13 PROCEDURE — 93005 ELECTROCARDIOGRAM TRACING: CPT | Performed by: PHYSICIAN ASSISTANT

## 2023-11-13 PROCEDURE — 87086 URINE CULTURE/COLONY COUNT: CPT

## 2023-11-13 PROCEDURE — 81001 URINALYSIS AUTO W/SCOPE: CPT

## 2023-11-13 PROCEDURE — 87186 SC STD MICRODIL/AGAR DIL: CPT

## 2023-11-13 ASSESSMENT — ENCOUNTER SYMPTOMS
ENDOCRINE NEGATIVE: 1
MUSCULOSKELETAL NEGATIVE: 1
CARDIOVASCULAR NEGATIVE: 1
PSYCHIATRIC NEGATIVE: 1
GASTROINTESTINAL NEGATIVE: 1
RESPIRATORY NEGATIVE: 1
HEMATOLOGIC/LYMPHATIC NEGATIVE: 1
ALLERGIC/IMMUNOLOGIC NEGATIVE: 1
CONSTITUTIONAL NEGATIVE: 1
NEUROLOGICAL NEGATIVE: 1
EYES NEGATIVE: 1
FREQUENCY: 1

## 2023-11-13 NOTE — H&P
History Of Present Illness  Sarita Morelos is a 73 y.o. female with mixed urinary incontinence, urge predominant, history of Botox and July 2020 InterStim placement, presenting for cystoscopy with botox injection     Past Medical History  Past Medical History:   Diagnosis Date    Abnormal weight gain 03/15/2018    Weight gain following gastric bypass surgery    Age-related nuclear cataract, bilateral 03/15/2018    Cataract, nuclear sclerotic, both eyes    Age-related nuclear cataract, left eye 05/21/2018    Age-related nuclear cataract, left    Age-related nuclear cataract, right eye 04/09/2018    Age-related nuclear cataract, right    Body mass index (BMI) 45.0-49.9, adult (CMS/Formerly [facility]) 07/06/2022    Body mass index (BMI) of 45.0 to 49.9 in adult    Body mass index (BMI)40.0-44.9, adult 03/03/2021    BMI 40.0-44.9, adult    Cervical myelopathy (CMS/Formerly [facility])     Chronic lumbar radiculopathy     CKD (chronic kidney disease)     stage 3    Conjunctival pigmentations, bilateral 10/03/2022    Conjunctival melanosis of both eyes    Cortical age-related cataract, left eye 05/21/2018    Cortical age-related cataract of left eye    Cortical age-related cataract, right eye 04/09/2018    Cortical age-related cataract of right eye    Cortical age-related cataract, unspecified eye 03/15/2018    Cataract cortical, senile    Depression     Dry eye syndrome of bilateral lacrimal glands 10/03/2022    Dry eyes, bilateral    Gastro-esophageal reflux disease without esophagitis 03/15/2018    Gastric reflux    Gross hematuria 06/05/2020    Gross hematuria    Heart murmur     HLD (hyperlipidemia)     HTN (hypertension)     Iron deficiency anemia, unspecified 04/08/2022    Iron deficiency anemia, unspecified iron deficiency anemia type    Kidney stones     Morbid (severe) obesity due to excess calories (CMS/[facility]) 07/06/2022    Class 3 severe obesity with serious comorbidity and body mass index (BMI) of 45.0 to 49.9 in adult,  unspecified obesity type    Myopia, bilateral 10/03/2022    Myopia of both eyes with astigmatism and presbyopia    OA (osteoarthritis)     OAB (overactive bladder)     Osteopenia     Osteoporosis     Other age-related incipient cataract, right eye 03/02/2018    Other age-related incipient cataract of right eye    Other conditions influencing health status     Herniated Disc (C5 - C6)    Other conditions influencing health status     Multiple Renal Cysts    Other conditions influencing health status 05/22/2015    History of pregnancy    Pain in left shoulder 03/19/2016    Left shoulder pain, unspecified chronicity    Pain in unspecified shoulder 07/15/2015    Shoulder pain    Personal history of other diseases of the nervous system and sense organs 07/30/2014    History of sleep apnea    Personal history of other drug therapy 05/23/2018    History of pneumococcal vaccination    Personal history of other specified conditions 04/25/2014    History of fatigue    Polyp of colon 05/23/2018    Benign colon polyp    Prediabetes 07/23/2015    Prediabetes    Prediabetes 10/03/2022    Pre-diabetes, A1C= 6.0% on 5/1/23    Spinal stenosis     Spondylolisthesis     Thyrotoxicosis with diffuse goiter without thyrotoxic crisis or storm 03/15/2018    Goiter with hyperthyroidism    Thyrotoxicosis, unspecified without thyrotoxic crisis or storm 03/15/2018    Overactive thyroid gland    Type 2 diabetes mellitus without complications (CMS/HCC) 09/29/2015    Controlled diabetes mellitus type II without complication    Unspecified astigmatism, unspecified eye 03/15/2018    Astigmatism with presbyopia    Unspecified retinoschisis, right eye 05/21/2018    Retinoschisis of right eye    Unspecified visual loss 04/25/2014    Vision problems    Vitamin D deficiency        Surgical History  Past Surgical History:   Procedure Laterality Date    APPENDECTOMY  11/15/2017    Appendectomy    CARPAL TUNNEL RELEASE  03/29/2013    Neuroplasty  Decompression Median Nerve At Carpal Tunnel    CATARACT EXTRACTION Bilateral     CERVICAL DISCECTOMY  07/01/2015    Spinal Diskectomy Cervical    COLONOSCOPY  05/31/2018    Complete Colonoscopy    GASTRIC BYPASS  08/22/2016    Gastric Surgery For Morbid Obesity Gastric Bypass    LITHOTRIPSY  02/07/2014    Renal Lithotripsy    OTHER SURGICAL HISTORY  02/14/2013    Anterior Colporrhaphy, Repair Of Cystocele    OTHER SURGICAL HISTORY  08/21/2013    Cystoscopy With Insertion Of Ureteral Stent    OTHER SURGICAL HISTORY  10/14/2019    Lumbar laminectomy    OTHER SURGICAL HISTORY  02/20/2018    Upper Gastrointestinal Endoscopy, Simple Primary Exam    OTHER SURGICAL HISTORY  10/11/2021    Neurostimulator device insertion    OTHER SURGICAL HISTORY  10/11/2021    Mid-urethral sling insertion    TOTAL KNEE ARTHROPLASTY  10/04/2018    Knee Replacement, right        Social History  She reports that she has never smoked. She has never used smokeless tobacco. She reports current alcohol use. She reports that she does not use drugs.    Family History  Family History   Problem Relation Name Age of Onset    Breast cancer Mother      No Known Problems Father      No Known Problems Sister      No Known Problems Brother      Diabetes Paternal Grandmother      Breast cancer Paternal Grandmother          Allergies  Gabapentin, Nitrofurantoin, and Sulfamethoxazole-trimethoprim     Physical exam  General: no acute distress  HEENT: normocephalic, atraumatic  Heart: warm and well perfused  Lungs: breathing comfortably on room air  Abdomen: nondistended  Extremities: moving all extremities  Neuro: awake and conversant  Psych: appropriate mood and affect       Last Recorded Vitals  There were no vitals taken for this visit.     Assessment/Plan   Principal Problem:    Urge incontinence of urine  Sarita Morelos is a 73 y.o. female with mixed urinary incontinence, urge predominant, history of Botox and July 2020 InterStim placement,  presenting for excision of non-functional non-MRI compatible Interstim and cystoscopy with botox injection    I have reviewed the patient's History and Physical Examination. I have personally seen and evaluated the patient, repeating key portions. There is no significant interval change.     Surgery is still indicated. Yes    Consent reviewed and signed by patient/family: Yes    Kate Figueredo MD  PGY-3, Obstetrics and Gynecology

## 2023-11-13 NOTE — CPM/PAT H&P
[facility] Evaluation       Name: Sarita Morelos (Sarita Morelos)  /Age: [birth year]/73 y.o.     In-Person       Chief Complaint: Urge incontinence of urine     HPI    Date of Consult: 23    Referring Provider: Dr. Valdez    Surgery, Date, and Length: Cystoscopy with Injection Therapeutic Agent ; 23; 60 minutes     Sarita Morelos is a 73 year-old female who presents to the [facility] for perioperative risk assessment prior to surgery.  Patient has noted worsening lower urinary tract urgency and frequency complaints.  Denies recurrent infections.  Also have a non functioning interstim in place.  Patient previously had Botox and according to records was helpful with symptoms.    This note was created in part upon personal review of patient's medical records.      Patient is scheduled to have Cystoscopy with Injection Therapeutic Agent.  Medical History  Past Medical History:   Diagnosis Date    Abnormal weight gain 03/15/2018    Weight gain following gastric bypass surgery    Arrhythmia     tachycardia post op 2022 - evaluated and placed on metoprolol with no further issues    Arthritis     Body mass index (BMI) 45.0-49.9, adult (CMS/formerly [facility]) 2022    Body mass index (BMI) of 45.0 to 49.9 in adult    Body mass index (BMI)40.0-44.9, adult 2021    BMI 40.0-44.9, adult    Cervical myelopathy (CMS/formerly [facility])     Chronic lumbar radiculopathy     CKD (chronic kidney disease)     stage 3    Conjunctival pigmentations, bilateral 10/03/2022    Conjunctival melanosis of both eyes    Depression     Dry eye syndrome of bilateral lacrimal glands 10/03/2022    Dry eyes, bilateral    Gastro-esophageal reflux disease without esophagitis 03/15/2018    Gastric reflux    Gross hematuria 2020    Gross hematuria    Heart murmur     Hiatal hernia     HLD (hyperlipidemia)     HTN (hypertension)     Iron deficiency anemia, unspecified 2022    Iron deficiency anemia,  unspecified iron deficiency anemia type    Kidney stones     Morbid (severe) obesity due to excess calories (CMS/[facility]) 07/06/2022    Class 3 severe obesity with serious comorbidity and body mass index (BMI) of 45.0 to 49.9 in adult, unspecified obesity type    Myopia, bilateral 10/03/2022    Myopia of both eyes with astigmatism and presbyopia    Nephrolithiasis     OA (osteoarthritis)     OAB (overactive bladder)     Osteopenia     Osteoporosis     Other conditions influencing health status     Herniated Disc (C5 - C6)    Other conditions influencing health status     Multiple Renal Cysts    Other conditions influencing health status 05/22/2015    History of pregnancy    Pain in left shoulder 03/19/2016    Left shoulder pain, unspecified chronicity    Pain in unspecified shoulder 07/15/2015    Shoulder pain    Personal history of other diseases of the nervous system and sense organs 07/30/2014    History of sleep apnea    Personal history of other drug therapy 05/23/2018    History of pneumococcal vaccination    Personal history of other specified conditions 04/25/2014    History of fatigue    Polyp of colon 05/23/2018    Benign colon polyp    Prediabetes 07/23/2015    Prediabetes    Prediabetes 10/03/2022    Pre-diabetes, A1C= 6.0% on 5/1/23    Spinal stenosis     Spondylolisthesis     Thyrotoxicosis with diffuse goiter without thyrotoxic crisis or storm 03/15/2018    Goiter with hyperthyroidism    Thyrotoxicosis, unspecified without thyrotoxic crisis or storm 03/15/2018    Overactive thyroid gland    Type 2 diabetes mellitus without complications (CMS/[facility]) 09/29/2015    Controlled diabetes mellitus type II without complication    Unspecified astigmatism, unspecified eye 03/15/2018    Astigmatism with presbyopia    Unspecified retinoschisis, right eye 05/21/2018    Retinoschisis of right eye    Unspecified visual loss 04/25/2014    Vision problems    Vitamin D deficiency         STOP BANG = 3    Caprini =  4    Surgical History  Past Surgical History:   Procedure Laterality Date    APPENDECTOMY  11/15/2017    Appendectomy    BLADDER SUSPENSION      mid urethral sling    CARPAL TUNNEL RELEASE  03/29/2013    Neuroplasty Decompression Median Nerve At Carpal Tunnel    CATARACT EXTRACTION Bilateral     CERVICAL DISCECTOMY  07/01/2015    Spinal Diskectomy Cervical    CHOLECYSTECTOMY      COLONOSCOPY  05/31/2018    Complete Colonoscopy    CYSTOCELE REPAIR      GASTRIC BYPASS  08/22/2016    Gastric Surgery For Morbid Obesity Gastric Bypass    LITHOTRIPSY  02/07/2014    Renal Lithotripsy    LUMBAR LAMINECTOMY      OTHER SURGICAL HISTORY  10/2021    interstim placement    TOTAL KNEE ARTHROPLASTY  10/04/2018    Knee Replacement, right          The patient is not a [employer] and will accept blood and blood products if medically indicated.        Family history:  Family History   Problem Relation Name Age of Onset    Breast cancer Mother      No Known Problems Father      No Known Problems Sister      No Known Problems Brother      Diabetes Paternal Grandmother      Breast cancer Paternal Grandmother          Social history:  Social History     Socioeconomic History    Marital status:      Spouse name: Not on file    Number of children: Not on file    Years of education: Not on file    Highest education level: Not on file   Occupational History    Not on file   Tobacco Use    Smoking status: Never    Smokeless tobacco: Never   Vaping Use    Vaping Use: Never used   Substance and Sexual Activity    Alcohol use: Yes     Comment: 1 glass of wine a month    Drug use: Never    Sexual activity: Defer   Other Topics Concern    Not on file   Social History Narrative    Not on file     Social Determinants of Health     Financial Resource Strain: Not on file   Food Insecurity: Not on file   Transportation Needs: Not on file   Physical Activity: Not on file   Stress: Not on file   Social Connections: Not on file   Intimate  Partner Violence: Not on file   Housing Stability: Not on file          Current Outpatient Medications:     acetaminophen (Tylenol 8 HOUR) 650 mg ER tablet, Take 1 tablet (650 mg) by mouth every 8 hours if needed for mild pain (1 - 3). Do not crush, chew, or split., Disp: , Rfl:     amLODIPine (Norvasc) 10 mg tablet, Take 1 tablet (10 mg) by mouth once daily., Disp: 30 tablet, Rfl: 3    aspirin-acetaminophen-caffeine (Excedrin Migraine) 250-250-65 mg tablet, Take 1 tablet by mouth every 6 hours if needed for headaches., Disp: , Rfl:     atorvastatin (Lipitor) 40 mg tablet, TAKE 1 TABLET (40 MG) BY MOUTH ONCE DAILY AT BEDTIME., Disp: 90 tablet, Rfl: 0    azelastine (Optivar) 0.05 % ophthalmic solution, Administer 1 drop into both eyes if needed., Disp: , Rfl:     cholecalciferol (Vitamin D-3) 25 MCG (1000 UT) tablet, Take 1 tablet (25 mcg) by mouth once daily., Disp: , Rfl:     diclofenac sodium (Voltaren) 1 % gel gel, Apply 1 Application topically 4 times a day., Disp: 100 g, Rfl: 1    DULoxetine (Cymbalta) 60 mg DR capsule, Take 2 capsules (120 mg) by mouth once daily., Disp: 180 capsule, Rfl: 0    estradiol (Estrace) 0.01 % (0.1 mg/gram) vaginal cream, Insert into the vagina if needed., Disp: , Rfl:     ferrous sulfate 325 (65 Fe) MG tablet, Take 1 tablet (325 mg) by mouth 2 times a week., Disp: , Rfl:     ibuprofen 200 mg tablet, Take 1 tablet (200 mg) by mouth every 6 hours if needed for mild pain (1 - 3)., Disp: , Rfl:     Lactobacillus acidoph-L.bulgar 1 million cell tablet tablet, Take by mouth 2 times a week., Disp: , Rfl:     meloxicam (Mobic) 15 mg tablet, Take 1 tablet (15 mg) by mouth once daily., Disp: 30 tablet, Rfl: 11    metoprolol tartrate (Lopressor) 75 mg tablet, Take 1 tablet (75 mg) by mouth once daily., Disp: 90 tablet, Rfl: 1    multivit with minerals/lutein (MULTIVITAMIN 50 PLUS ORAL), Take 1 tablet by mouth 3 times a week., Disp: , Rfl:     omeprazole (PriLOSEC) 20 mg DR capsule, Take 1  "capsule (20 mg) by mouth once daily., Disp: , Rfl:     semaglutide 0.25 mg or 0.5 mg (2 mg/3 mL) pen injector, Inject 0.5 mg under the skin 1 (one) time per week. (Patient taking differently: Inject 0.5 mg under the skin 1 (one) time per week. Last dose 11/11/23), Disp: 6.42 mL, Rfl: 2    turmeric root extract 500 mg capsule, Take 1 capsule by mouth once daily., Disp: , Rfl:          /67   Pulse 81   Temp 36.5 °C (97.7 °F)   Resp 18   Ht 1.549 m (5' 1\")   Wt 112 kg (247 lb 9.2 oz)   SpO2 97%   BMI 46.78 kg/m²      Review of Systems   Constitutional: Negative.    HENT: Negative.          Reading glasses   Eyes: Negative.    Respiratory: Negative.     Cardiovascular: Negative.    Gastrointestinal: Negative.    Endocrine: Negative.    Genitourinary:  Positive for frequency and urgency.   Musculoskeletal: Negative.    Skin: Negative.    Allergic/Immunologic: Negative.    Neurological: Negative.    Hematological: Negative.    Psychiatric/Behavioral: Negative.          Physical Exam  Vitals reviewed.   Constitutional:       Appearance: Normal appearance.   HENT:      Head: Normocephalic and atraumatic.      Right Ear: External ear normal.      Left Ear: External ear normal.      Nose: Nose normal.      Mouth/Throat:      Pharynx: Oropharynx is clear.   Eyes:      Extraocular Movements: Extraocular movements intact.      Conjunctiva/sclera: Conjunctivae normal.      Pupils: Pupils are equal, round, and reactive to light.   Cardiovascular:      Rate and Rhythm: Normal rate and regular rhythm.      Heart sounds: Normal heart sounds.   Pulmonary:      Effort: Pulmonary effort is normal.      Breath sounds: Normal breath sounds.   Abdominal:      Palpations: Abdomen is soft.   Musculoskeletal:         General: Normal range of motion.      Cervical back: Normal range of motion and neck supple.   Skin:     General: Skin is warm and dry.   Neurological:      General: No focal deficit present.      Mental Status: She " is alert and oriented to person, place, and time.   Psychiatric:         Mood and Affect: Mood normal.         Behavior: Behavior normal.          PAT AIRWAY:   Airway:     Mallampati::  I    Neck ROM::  Full        Lab Results   Component Value Date    WBC 8.0 09/12/2023    HGB 13.2 09/12/2023    HCT 41.5 09/12/2023    MCV 87 09/12/2023     09/12/2023        Lab Results   Component Value Date    GLUCOSE 96 09/12/2023    CALCIUM 9.5 09/12/2023     09/12/2023    K 4.2 09/12/2023    CO2 25 09/12/2023     09/12/2023    BUN 19 09/12/2023    CREATININE 1.09 (H) 09/12/2023        EKG 11/13/23  NSR  Possible left atrial enlargement  LVH  71 BPM      RCRI  0  , 3.9 % Risk of MACE    Cardiac  HTN - continue amlodipine / metoprolol DOS   HLD - continue atorvastatin DOS   Pulmonary  Renal  Endocrinology  Hematology       Patient instructed to ambulate as soon as possible postoperatively to decrease thromboembolic risk.      Initiate mechanical DVT prophylaxis as soon as possible and initiate chemical prophylaxis when deemed safe from a bleeding standpoint post surgery.       VTE prophylaxis per surgical team   GI  GERD -  continue omeprazole DOS     Tests ordered in PAT: ua, EKG   UA +nitrites - Dr. Valdez sent secure chat and email    Risk assessment complete.  Patient is scheduled for a low surgical risk procedure.        Preoperative medication instructions were provided and reviewed with the patient.  Any additional testing or evaluation was explained to the patient.  Nothing by mouth instructions were discussed and patient's questions were answered prior to conclusion to this encounter.  Patient verbalized understanding of preoperative instructions given in preadmission testing; discharge instructions available in EMR.    This note was dictated by a speech recognition.  Minor errors may have been detected in a speech recognition.

## 2023-11-13 NOTE — H&P (VIEW-ONLY)
[facility] Evaluation       Name: Sarita Morelos (Sarita Morelos)  /Age: [birth year]/73 y.o.     In-Person       Chief Complaint: Urge incontinence of urine     HPI    Date of Consult: 23    Referring Provider: Dr. Valdez    Surgery, Date, and Length: Cystoscopy with Injection Therapeutic Agent ; 23; 60 minutes     Sarita Morelos is a 73 year-old female who presents to the [facility] for perioperative risk assessment prior to surgery.  Patient has noted worsening lower urinary tract urgency and frequency complaints.  Denies recurrent infections.  Also have a non functioning interstim in place.  Patient previously had Botox and according to records was helpful with symptoms.    This note was created in part upon personal review of patient's medical records.      Patient is scheduled to have Cystoscopy with Injection Therapeutic Agent.  Medical History  Past Medical History:   Diagnosis Date    Abnormal weight gain 03/15/2018    Weight gain following gastric bypass surgery    Arrhythmia     tachycardia post op 2022 - evaluated and placed on metoprolol with no further issues    Arthritis     Body mass index (BMI) 45.0-49.9, adult (CMS/[facility]) 2022    Body mass index (BMI) of 45.0 to 49.9 in adult    Body mass index (BMI)40.0-44.9, adult 2021    BMI 40.0-44.9, adult    Cervical myelopathy (CMS/[facility])     Chronic lumbar radiculopathy     CKD (chronic kidney disease)     stage 3    Conjunctival pigmentations, bilateral 10/03/2022    Conjunctival melanosis of both eyes    Depression     Dry eye syndrome of bilateral lacrimal glands 10/03/2022    Dry eyes, bilateral    Gastro-esophageal reflux disease without esophagitis 03/15/2018    Gastric reflux    Gross hematuria 2020    Gross hematuria    Heart murmur     Hiatal hernia     HLD (hyperlipidemia)     HTN (hypertension)     Iron deficiency anemia, unspecified 2022    Iron deficiency anemia,  unspecified iron deficiency anemia type    Kidney stones     Morbid (severe) obesity due to excess calories (CMS/[facility]) 07/06/2022    Class 3 severe obesity with serious comorbidity and body mass index (BMI) of 45.0 to 49.9 in adult, unspecified obesity type    Myopia, bilateral 10/03/2022    Myopia of both eyes with astigmatism and presbyopia    Nephrolithiasis     OA (osteoarthritis)     OAB (overactive bladder)     Osteopenia     Osteoporosis     Other conditions influencing health status     Herniated Disc (C5 - C6)    Other conditions influencing health status     Multiple Renal Cysts    Other conditions influencing health status 05/22/2015    History of pregnancy    Pain in left shoulder 03/19/2016    Left shoulder pain, unspecified chronicity    Pain in unspecified shoulder 07/15/2015    Shoulder pain    Personal history of other diseases of the nervous system and sense organs 07/30/2014    History of sleep apnea    Personal history of other drug therapy 05/23/2018    History of pneumococcal vaccination    Personal history of other specified conditions 04/25/2014    History of fatigue    Polyp of colon 05/23/2018    Benign colon polyp    Prediabetes 07/23/2015    Prediabetes    Prediabetes 10/03/2022    Pre-diabetes, A1C= 6.0% on 5/1/23    Spinal stenosis     Spondylolisthesis     Thyrotoxicosis with diffuse goiter without thyrotoxic crisis or storm 03/15/2018    Goiter with hyperthyroidism    Thyrotoxicosis, unspecified without thyrotoxic crisis or storm 03/15/2018    Overactive thyroid gland    Type 2 diabetes mellitus without complications (CMS/[facility]) 09/29/2015    Controlled diabetes mellitus type II without complication    Unspecified astigmatism, unspecified eye 03/15/2018    Astigmatism with presbyopia    Unspecified retinoschisis, right eye 05/21/2018    Retinoschisis of right eye    Unspecified visual loss 04/25/2014    Vision problems    Vitamin D deficiency         STOP BANG = 3    Caprini =  4    Surgical History  Past Surgical History:   Procedure Laterality Date    APPENDECTOMY  11/15/2017    Appendectomy    BLADDER SUSPENSION      mid urethral sling    CARPAL TUNNEL RELEASE  03/29/2013    Neuroplasty Decompression Median Nerve At Carpal Tunnel    CATARACT EXTRACTION Bilateral     CERVICAL DISCECTOMY  07/01/2015    Spinal Diskectomy Cervical    CHOLECYSTECTOMY      COLONOSCOPY  05/31/2018    Complete Colonoscopy    CYSTOCELE REPAIR      GASTRIC BYPASS  08/22/2016    Gastric Surgery For Morbid Obesity Gastric Bypass    LITHOTRIPSY  02/07/2014    Renal Lithotripsy    LUMBAR LAMINECTOMY      OTHER SURGICAL HISTORY  10/2021    interstim placement    TOTAL KNEE ARTHROPLASTY  10/04/2018    Knee Replacement, right          The patient is not a Jew and will accept blood and blood products if medically indicated.        Family history:  Family History   Problem Relation Name Age of Onset    Breast cancer Mother      No Known Problems Father      No Known Problems Sister      No Known Problems Brother      Diabetes Paternal Grandmother      Breast cancer Paternal Grandmother          Social history:  Social History     Socioeconomic History    Marital status:      Spouse name: Not on file    Number of children: Not on file    Years of education: Not on file    Highest education level: Not on file   Occupational History    Not on file   Tobacco Use    Smoking status: Never    Smokeless tobacco: Never   Vaping Use    Vaping Use: Never used   Substance and Sexual Activity    Alcohol use: Yes     Comment: 1 glass of wine a month    Drug use: Never    Sexual activity: Defer   Other Topics Concern    Not on file   Social History Narrative    Not on file     Social Determinants of Health     Financial Resource Strain: Not on file   Food Insecurity: Not on file   Transportation Needs: Not on file   Physical Activity: Not on file   Stress: Not on file   Social Connections: Not on file   Intimate  Partner Violence: Not on file   Housing Stability: Not on file          Current Outpatient Medications:     acetaminophen (Tylenol 8 HOUR) 650 mg ER tablet, Take 1 tablet (650 mg) by mouth every 8 hours if needed for mild pain (1 - 3). Do not crush, chew, or split., Disp: , Rfl:     amLODIPine (Norvasc) 10 mg tablet, Take 1 tablet (10 mg) by mouth once daily., Disp: 30 tablet, Rfl: 3    aspirin-acetaminophen-caffeine (Excedrin Migraine) 250-250-65 mg tablet, Take 1 tablet by mouth every 6 hours if needed for headaches., Disp: , Rfl:     atorvastatin (Lipitor) 40 mg tablet, TAKE 1 TABLET (40 MG) BY MOUTH ONCE DAILY AT BEDTIME., Disp: 90 tablet, Rfl: 0    azelastine (Optivar) 0.05 % ophthalmic solution, Administer 1 drop into both eyes if needed., Disp: , Rfl:     cholecalciferol (Vitamin D-3) 25 MCG (1000 UT) tablet, Take 1 tablet (25 mcg) by mouth once daily., Disp: , Rfl:     diclofenac sodium (Voltaren) 1 % gel gel, Apply 1 Application topically 4 times a day., Disp: 100 g, Rfl: 1    DULoxetine (Cymbalta) 60 mg DR capsule, Take 2 capsules (120 mg) by mouth once daily., Disp: 180 capsule, Rfl: 0    estradiol (Estrace) 0.01 % (0.1 mg/gram) vaginal cream, Insert into the vagina if needed., Disp: , Rfl:     ferrous sulfate 325 (65 Fe) MG tablet, Take 1 tablet (325 mg) by mouth 2 times a week., Disp: , Rfl:     ibuprofen 200 mg tablet, Take 1 tablet (200 mg) by mouth every 6 hours if needed for mild pain (1 - 3)., Disp: , Rfl:     Lactobacillus acidoph-L.bulgar 1 million cell tablet tablet, Take by mouth 2 times a week., Disp: , Rfl:     meloxicam (Mobic) 15 mg tablet, Take 1 tablet (15 mg) by mouth once daily., Disp: 30 tablet, Rfl: 11    metoprolol tartrate (Lopressor) 75 mg tablet, Take 1 tablet (75 mg) by mouth once daily., Disp: 90 tablet, Rfl: 1    multivit with minerals/lutein (MULTIVITAMIN 50 PLUS ORAL), Take 1 tablet by mouth 3 times a week., Disp: , Rfl:     omeprazole (PriLOSEC) 20 mg DR capsule, Take 1  "capsule (20 mg) by mouth once daily., Disp: , Rfl:     semaglutide 0.25 mg or 0.5 mg (2 mg/3 mL) pen injector, Inject 0.5 mg under the skin 1 (one) time per week. (Patient taking differently: Inject 0.5 mg under the skin 1 (one) time per week. Last dose 11/11/23), Disp: 6.42 mL, Rfl: 2    turmeric root extract 500 mg capsule, Take 1 capsule by mouth once daily., Disp: , Rfl:          /67   Pulse 81   Temp 36.5 °C (97.7 °F)   Resp 18   Ht 1.549 m (5' 1\")   Wt 112 kg (247 lb 9.2 oz)   SpO2 97%   BMI 46.78 kg/m²      Review of Systems   Constitutional: Negative.    HENT: Negative.          Reading glasses   Eyes: Negative.    Respiratory: Negative.     Cardiovascular: Negative.    Gastrointestinal: Negative.    Endocrine: Negative.    Genitourinary:  Positive for frequency and urgency.   Musculoskeletal: Negative.    Skin: Negative.    Allergic/Immunologic: Negative.    Neurological: Negative.    Hematological: Negative.    Psychiatric/Behavioral: Negative.          Physical Exam  Vitals reviewed.   Constitutional:       Appearance: Normal appearance.   HENT:      Head: Normocephalic and atraumatic.      Right Ear: External ear normal.      Left Ear: External ear normal.      Nose: Nose normal.      Mouth/Throat:      Pharynx: Oropharynx is clear.   Eyes:      Extraocular Movements: Extraocular movements intact.      Conjunctiva/sclera: Conjunctivae normal.      Pupils: Pupils are equal, round, and reactive to light.   Cardiovascular:      Rate and Rhythm: Normal rate and regular rhythm.      Heart sounds: Normal heart sounds.   Pulmonary:      Effort: Pulmonary effort is normal.      Breath sounds: Normal breath sounds.   Abdominal:      Palpations: Abdomen is soft.   Musculoskeletal:         General: Normal range of motion.      Cervical back: Normal range of motion and neck supple.   Skin:     General: Skin is warm and dry.   Neurological:      General: No focal deficit present.      Mental Status: She " is alert and oriented to person, place, and time.   Psychiatric:         Mood and Affect: Mood normal.         Behavior: Behavior normal.          PAT AIRWAY:   Airway:     Mallampati::  I    Neck ROM::  Full        Lab Results   Component Value Date    WBC 8.0 09/12/2023    HGB 13.2 09/12/2023    HCT 41.5 09/12/2023    MCV 87 09/12/2023     09/12/2023        Lab Results   Component Value Date    GLUCOSE 96 09/12/2023    CALCIUM 9.5 09/12/2023     09/12/2023    K 4.2 09/12/2023    CO2 25 09/12/2023     09/12/2023    BUN 19 09/12/2023    CREATININE 1.09 (H) 09/12/2023        EKG 11/13/23  NSR  Possible left atrial enlargement  LVH  71 BPM      RCRI  0  , 3.9 % Risk of MACE    Cardiac  HTN - continue amlodipine / metoprolol DOS   HLD - continue atorvastatin DOS   Pulmonary  Renal  Endocrinology  Hematology       Patient instructed to ambulate as soon as possible postoperatively to decrease thromboembolic risk.      Initiate mechanical DVT prophylaxis as soon as possible and initiate chemical prophylaxis when deemed safe from a bleeding standpoint post surgery.       VTE prophylaxis per surgical team   GI  GERD -  continue omeprazole DOS     Tests ordered in PAT: ua, EKG   UA +nitrites - Dr. Valdez sent secure chat and email    Risk assessment complete.  Patient is scheduled for a low surgical risk procedure.        Preoperative medication instructions were provided and reviewed with the patient.  Any additional testing or evaluation was explained to the patient.  Nothing by mouth instructions were discussed and patient's questions were answered prior to conclusion to this encounter.  Patient verbalized understanding of preoperative instructions given in preadmission testing; discharge instructions available in EMR.    This note was dictated by a speech recognition.  Minor errors may have been detected in a speech recognition.

## 2023-11-13 NOTE — PREPROCEDURE INSTRUCTIONS
Medication List            Accurate as of November 13, 2023  9:14 AM. Always use your most recent med list.                acetaminophen 650 mg ER tablet  Commonly known as: Tylenol 8 HOUR  Notes to patient: Use if needed morning of surgery      amLODIPine 10 mg tablet  Commonly known as: Norvasc  Take 1 tablet (10 mg) by mouth once daily.  Medication Adjustments for Surgery: Take morning of surgery with sip of water, no other fluids     aspirin-acetaminophen-caffeine 250-250-65 mg tablet  Commonly known as: Excedrin Migraine  Medication Adjustments for Surgery: Stop 7 days before surgery     atorvastatin 40 mg tablet  Commonly known as: Lipitor  TAKE 1 TABLET (40 MG) BY MOUTH ONCE DAILY AT BEDTIME.  Medication Adjustments for Surgery: Take morning of surgery with sip of water, no other fluids     azelastine 0.05 % ophthalmic solution  Commonly known as: Optivar  Medication Adjustments for Surgery: Take morning of surgery with sip of water, no other fluids     cholecalciferol 25 MCG (1000 UT) tablet  Commonly known as: Vitamin D-3  Medication Adjustments for Surgery: Continue until night before surgery     diclofenac sodium 1 % gel gel  Commonly known as: Voltaren  Apply 1 Application topically 4 times a day.  Medication Adjustments for Surgery: Continue until night before surgery     DULoxetine 60 mg DR capsule  Commonly known as: Cymbalta  Take 2 capsules (120 mg) by mouth once daily.  Medication Adjustments for Surgery: Take morning of surgery with sip of water, no other fluids     estradiol 0.01 % (0.1 mg/gram) vaginal cream  Commonly known as: Estrace  Medication Adjustments for Surgery: Continue until night before surgery     ferrous sulfate (325 mg ferrous sulfate) tablet  Medication Adjustments for Surgery: Continue until night before surgery     ibuprofen 200 mg tablet  Medication Adjustments for Surgery: Stop 7 days before surgery     lactobacillus acidophilus tablet tablet  Medication Adjustments for  Surgery: Continue until night before surgery     meloxicam 15 mg tablet  Commonly known as: Mobic  Take 1 tablet (15 mg) by mouth once daily.  Medication Adjustments for Surgery: Stop 7 days before surgery     metoprolol tartrate 75 mg tablet  Commonly known as: Lopressor  Take 1 tablet (75 mg) by mouth once daily.  Medication Adjustments for Surgery: Take morning of surgery with sip of water, no other fluids     MULTIVITAMIN 50 PLUS ORAL  Medication Adjustments for Surgery: Stop 7 days before surgery     omeprazole 20 mg DR capsule  Commonly known as: PriLOSEC  Medication Adjustments for Surgery: Take morning of surgery with sip of water, no other fluids     semaglutide 0.25 mg or 0.5 mg (2 mg/3 mL) pen injector  Inject 0.5 mg under the skin 1 (one) time per week.  Medication Adjustments for Surgery: Stop 7 days before surgery     turmeric root extract 500 mg capsule  Medication Adjustments for Surgery: Stop 7 days before surgery                              NPO Instructions:    Do not eat any food after midnight the night before your surgery/procedure.  You may have clear liquids until TWO hours before surgery/procedure. This includes water, black tea/coffee, (no milk or cream) apple juice and electrolyte drinks (Gatorade).  You may chew gum up to TWO hours before your surgery/procedure.    Additional Instructions:     CONTACT SURGEON'S OFFICE IF YOU DEVELOP:  * Fever = 100.4 F   * New respiratory symptoms (e.g. cough, shortness of breath, respiratory distress, sore throat)  * Recent loss of taste or smell  *Flu like symptoms such as headache, fatigue or gastrointestinal symptoms  * You develop any open sores, shingles, burning or painful urination   AND/OR:  * You no longer wish to have the surgery.  * Any other personal circumstances change that may lead to the need to cancel or defer this surgery.  *You were admitted to any hospital within one week of your planned procedure.    SMOKING:  *Quitting smoking can  make a huge difference to your health and recovery from surgery.    *If you need help with quitting, call 2-804-QUIT-NOW.    THE DAY BEFORE SURGERY:  *Do not eat any food after midnight the night before surgery.   *You are permitted to drink clear liquids (i.e. water, black coffee, tea, clear broth, apple juice) up to 2 hours before your surgery.  DIABETICS:  Please check fasting blood sugar  upon waking up.  If fasting sugar is <80 mg/dl, please drink 100ml/3oz of apple juice no later than 2 hours prior to surgery.      SURGICAL TIME  *You will be contacted between 2 p.m. and 6 p.m. the business day before your surgery with your arrival time.  *If you haven't received a call by 6pm, call [phone].  *Scheduled surgery times may change and you will be notified if this occurs-check your personal voicemail for any updates.    ON THE MORNING OF SURGERY:  *Wear comfortable, loose fitting clothing.   *Do not use moisturizers, creams, lotions or perfume.  *All jewelry and valuables should be left at home.  *Prosthetic devices such as contact lenses, hearing aids, dentures, eyelash extensions, hairpins and body piercing must be removed before surgery.    BRING WITH YOU:  *Photo ID and insurance card  *Current list of medicines and allergies  *Pacemaker/Defibrillator/Heart stent cards  *CPAP machine and mask  *Slings/splints/crutches  *Copy of your complete Advanced Directive/DHPOA-if applicable  *Neurostimulator implant remote    PARKING AND ARRIVAL:  *Check in at the Main Entrance desk and let them know you are here for surgery.  *You will be directed to the 2nd floor surgical waiting area.    AFTER OUTPATIENT SURGERY:  *A responsible adult MUST accompany you at the time of discharge and stay with you for 24 hours after your surgery.  *You may NOT drive yourself home after surgery.  *You may use a taxi or ride sharing service (Lyft, Uber) to return home ONLY if you are accompanied by a friend or family  member.  *Instructions for resuming your medications will be provided by your surgeon.

## 2023-11-15 DIAGNOSIS — N39.0 ACUTE UTI: Primary | ICD-10-CM

## 2023-11-15 LAB
ATRIAL RATE: 71 BPM
BACTERIA UR CULT: ABNORMAL
P AXIS: 41 DEGREES
P OFFSET: 188 MS
P ONSET: 129 MS
PR INTERVAL: 182 MS
Q ONSET: 220 MS
QRS COUNT: 11 BEATS
QRS DURATION: 84 MS
QT INTERVAL: 392 MS
QTC CALCULATION(BAZETT): 425 MS
QTC FREDERICIA: 414 MS
R AXIS: -14 DEGREES
T AXIS: 19 DEGREES
T OFFSET: 416 MS
VENTRICULAR RATE: 71 BPM

## 2023-11-15 RX ORDER — CIPROFLOXACIN 500 MG/1
500 TABLET ORAL 2 TIMES DAILY
Qty: 14 TABLET | Refills: 0 | Status: SHIPPED | OUTPATIENT
Start: 2023-11-15 | End: 2023-11-22

## 2023-11-17 ENCOUNTER — PREP FOR PROCEDURE (OUTPATIENT)
Dept: UROLOGY | Facility: CLINIC | Age: 73
End: 2023-11-17

## 2023-11-17 ENCOUNTER — TELEMEDICINE (OUTPATIENT)
Dept: UROLOGY | Facility: CLINIC | Age: 73
End: 2023-11-17
Payer: MEDICARE

## 2023-11-17 DIAGNOSIS — N81.89 PELVIC FLOOR WEAKNESS: ICD-10-CM

## 2023-11-17 DIAGNOSIS — N95.2 VAGINAL ATROPHY: ICD-10-CM

## 2023-11-17 DIAGNOSIS — N39.0 CHRONIC UTI: ICD-10-CM

## 2023-11-17 DIAGNOSIS — N39.41 URGE INCONTINENCE OF URINE: Primary | ICD-10-CM

## 2023-11-17 PROCEDURE — 99442 PR PHYS/QHP TELEPHONE EVALUATION 11-20 MIN: CPT | Performed by: OBSTETRICS & GYNECOLOGY

## 2023-11-17 NOTE — PROGRESS NOTES
Subjective   Patient ID: Sarita Morelos is a 73 y.o. female who presents for follow up.  HPI  This visit was performed through telemedicine   73-year-old with recurrent urinary tract infections and acute urinary tract infection, history of mid urethral sling, mixed urinary incontinence, urge predominant, history of Botox and July 2020 InterStim placement, vaginal atrophy, and pelvic floor weakness.    Patient had a UTI 11/15/2023, culture demonstrated E. Coli, she was started on Ciprofloxacin Treatment of cure urine culture prior to her Botox procedure.   She is noting improvements in her lower urinary tract symptoms.    She could not get an MRI due to her InterStim device. It was discussed that explantation of the device is possible with her intradetrusor Botox.     She denies any bowel related complaints, no fecal or flatal incontinence.    She has no other complaints.      From Previous note   73-year-old with recurrent urinary tract infections and acute urinary tract infection, history of mid urethral sling, mixed urinary incontinence, urge predominant, history of Botox and July 2020 InterStim placement, vaginal atrophy, and pelvic floor weakness.         The patient presents for her device interrogation but was unable to connect to the device.     She denies any bowel related complaints, no fecal or flatal incontinence.     She denies any vaginal complaints, no abnormal bleeding or discharge.      She has no other complaints.        From Previous note  73-year-old with recurrent urinary tract infections and acute urinary tract infection, history of mid urethral sling, mixed urinary incontinence, urge predominant, history of Botox and July 2020 InterStim placement, vaginal atrophy, and pelvic floor weakness.     The patient was last seen in April 2022. She reports of having a mass in her pelvis, she denies any draining or bleeding from it. She denies any pain.      She did have an episode of bowel  obstruction since our last appointment.     She has since stopped her Myrbetriq therapy and has noted worsening lower urinary tract urgency and frequency complaints. She did last interrogate her InterStim 1 month ago.     She has no other complaints     From previous note  The following visit was performed to telemedicine  72-year-old with recurrent urinary tract infections, history of mid urethral sling, mixed urinary incontinence, urge predominant, with history of Botox and July 2020 InterStim placement by Dr. Rod, with vaginal atrophy, and pelvic floor weakness.     The patient continues her cefdinir, vaginal estrogen therapy, and d-mannose therapy. She has stopped her oxybutynin therapy. Her InterStim was adjusted 12/14/2021 and she continues to note significant improvements in her lower urinary tract urgency and frequency complaints. However, she notes daytime urgency roughly every 2-4 hours. She notes 0-1 episodes of nocturia but does have significant urgency associated with this and concerns regarding leaking on the way to the bathroom with this urge. Roughly 7 days ago she increased her Myrbetriq 25 mg to 50 mg. She did feel that last night and the night before she had significant improvements in her nocturia complaints.     She otherwise denies any UTI symptoms.     She has no other complaints.        From previous note  71-year-old presenting as a self-referral with urinary urgency, frequency, incontinence, and history of chronic urinary tract infections and nephrolithiasis.     The patient has a complicated lower urinary tract history. She originally underwent a TVT sling in 2005 with anterior repair with Dr. Duagn. She has undergone multiple ureteroscopy's with laser lithotripsy and extracorporeal shockwave therapy over these last 15 years. Her last stone treatment was in 2015. She is also undergone multiple Botox treatments with Dr. Rod. She has undergone 100 units and most recently in November  "2019 200 units. This was complicated by retention with concomitant use of oxybutynin but this resolved. In 2020 the patient underwent successful InterStim placement. She felt that this was working well until the last \"few months\". Over the last few weeks she has noted worsening urgency and frequency up to every 20 to 45 minutes. She has utilized her home InterStim patient controller noting appropriate function and use.     The patient also has a history of chronic urinary tract infections. She states that she was on prophylactic cephalexin use in the past which improved her lower urinary tract symptoms. She has not been taking this for the last 6 to 12 months.     Today's urinalysis is positive for nitrites. She does feel that she has a urinary tract infection. She does have allergies to sulfa and Macrobid.     She notes episodic stress urinary incontinence which is worsened with her associated worsening urge and urge related incontinence. She has noted blood in her urine \"a few weeks ago\" but denies any gross hematuria recently.     She denies any vaginal complaints including vaginal bleeding or discharge. She denies any prolapse complaints. She is not sexually active.     She denies any bowel complaints. She denies any fecal or flatal incontinence.     She has no other complaints.   Review of Systems  Constitutional: No fever, No chills and No fatigue.   Eyes: No vision problems and No dryness of the eyes.   ENT: No dry mouth, No hearing loss and No nosebleeds.   Cardiovascular: No chest pain, No palpitations and No orthopnea.   Respiratory: No shortness of breath, No cough and No wheezing.   Gastrointestinal: No abdominal pain, No constipation, No nausea, No diarrhea, No vomiting and No melena.   Genitourinary: As noted in HPI.   Musculoskeletal: No back pain, No myalgias, No muscle weakness, No joint swelling and No leg edema.   Integumentary: No rashes, No skin lesion and No itching.   Neurological: No " headache, No numbness and No dizziness.   Psychiatric: No sleep disturbances, No anxiety and No depression.   Endocrine: No hot flashes, No loss of hair and No hirsutism.   Hematologic/Lymphatic: No swollen glands, No tendency for easy bleeding and No tendency for easy bruising.   All other systems have been reviewed and are negative for complaint.        Objective   Physical Exam  This visit was performed through telemedicine    Assessment/Plan    73-year-old with recurrent urinary tract infections and recent acute urinary tract infection, history of mid urethral sling, mixed urinary incontinence, urge predominant, history of Botox and July 2020 InterStim placement, vaginal atrophy, and pelvic floor weakness.     #1 the patient has noted significant improvements in her lower urinary tract complaints following appropriate ciprofloxacin treatment with her present urinary tract infection.  We again discussed the treatment algorithm for the patient's chronic urinary tract infections. She has had 1 UTI in the last 6 months. The patient is allergic to Macrobid and sulfa. She was previously on cefdinir but has since stopped this.. She will continue her d-mannose therapy as well as continuing to push fluids. She does have a history of kidney stones and we discussed not utilizing any cranberry extract tablets at this time. She was previously provided a new standing order for urinalysis and urine culture. We discussed the possibility of using methenamine therapy in the future.      2. The patient unfortunately has noted worsening lower urinary tract urgency and frequency complaints. Her Myrbetriq therapy is no longer adequate and she has since stopped this.  We have previously discussed that she had previously had success on program 3 and on program 7.  However her InterStim device could not be interrogated 10/31/2023.  Furthermore she has been requested to undergo an MRI and has noted that her previously placed InterStim is  "not MRI compatible.  She has previously undergone intradetrusor Botox in 2019 with Dr. Rod and was well controlled with her InterStim placed in July 2020. She has since stopped her oxybutynin therapy due to constipation and dry mouth complaints.  Her previous Botox therapy to be performed in the operating room due to patient discomfort was canceled due to an acute urinary tract infection.  She will be rescheduled for 100 units Botox and InterStim explant.  We discussed readdressing possible reimplantation of an MRI compatible device in the future should she not have adequate therapy with her Botox.    3. we discussed the patient's history of nephrolithiasis. 10/12/2021 upper tract imaging demonstrated an 8 mm nonobstructive right-sided stone. We will continue expectant management of this.     4. We have previously discussed her concerns for vaginal mass. She was noted to have a noninfected Bartholin's duct cyst 8/28/2023. These have \"come and gone\" and we discussed continued expectant management. The patient will proceed with sitz bath's and warm compresses should she note any painful concerns in this area in the future.     5. The patient will be scheduled at her earliest convenience at the [facility] for 100 units Botox and non-MRI compatible InterStim explant.     NAYLA Valdez MD      Scribe Attestation  By signing my name below, I, Dexter Koehler attest that this documentation has been prepared under the direction and in the presence of Jona Valdez MD. All medical record entries made by the Scribe were at my direction or personally dictated by me. I have reviewed the chart and agree that the record accurately reflects my personal performance of the history, physical exam, discussion and plan.    "

## 2023-11-24 ENCOUNTER — APPOINTMENT (OUTPATIENT)
Dept: RADIOLOGY | Facility: HOSPITAL | Age: 73
End: 2023-11-24
Payer: COMMERCIAL

## 2023-11-25 ASSESSMENT — SLIT LAMP EXAM - LIDS
COMMENTS: GOOD POSITION
COMMENTS: GOOD POSITION

## 2023-11-25 ASSESSMENT — EXTERNAL EXAM - LEFT EYE: OS_EXAM: NORMAL

## 2023-11-25 NOTE — PROGRESS NOTES
Pre-[id number]  -HbA1c= 6.0 (5/1/23). On Ozempic. No diabetic retinopathy seen on dilated exam. Continue close monitoring of blood glucose, blood pressure, and cholesterol. Plan for annual dilated eye exam, check baseline OCT RNFl for C/D asymmetry.     Periorbital ecchymosis - right eye  -Per patient, fell twice on 10/31 and 11/1 and hit same place on face on deck and rock. Did not go to ER.   -Full motility, no proptosis or enophthalmos. Orbital rim feels intact.   -Monitor for now.     Dry eyes, qledysthwO35.123  Allergic conjunctivitis of both eyesH10.13  -Tearing OS occasionally, also itching  -May use artificial tears 2-3 times a day and warm compresses 1-2x/day  -May use allergy drop Rx: Azelastine OU BID prn itching/allergies. Has allergic symptoms year round (pets).  -May call or return sooner if any worsening of symptoms.     Pseudophakia of left eyeZ96.1 - Complex with Trypan Blue (4/5/18)  PSEUDOPHAKIA OF RIGHT EYEZ96.1 - Complex with Trypan Blue (3/1/18)  -Doing well. Good vision. IOP good. Off all gtts.     Bilateral mratphuhaesqdA83.103  -Saw Dr. Whittington 3/15/18. Stable exam. Monitor.   -No retinal tear or detachment seen on exam. Retinal detachment symptoms discussed.     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 Monitor.     Presbyopia  -Rx filled in 2021, but may also use OTC reading glasses. New Rx given per patient request.      No FH of glaucoma  (+)FH AMD - MGM   Depressed

## 2023-11-27 ENCOUNTER — TELEPHONE (OUTPATIENT)
Dept: PRIMARY CARE | Facility: CLINIC | Age: 73
End: 2023-11-27

## 2023-11-27 ENCOUNTER — OFFICE VISIT (OUTPATIENT)
Dept: OPHTHALMOLOGY | Facility: CLINIC | Age: 73
End: 2023-11-27
Payer: MEDICARE

## 2023-11-27 DIAGNOSIS — Z96.1 PSEUDOPHAKIA: ICD-10-CM

## 2023-11-27 DIAGNOSIS — S00.11XA PERIORBITAL ECCHYMOSIS OF RIGHT EYE, INITIAL ENCOUNTER: ICD-10-CM

## 2023-11-27 DIAGNOSIS — H52.4 PRESBYOPIA: ICD-10-CM

## 2023-11-27 DIAGNOSIS — H10.13 ALLERGIC CONJUNCTIVITIS OF BOTH EYES: ICD-10-CM

## 2023-11-27 DIAGNOSIS — H04.123 DRY EYES, BILATERAL: ICD-10-CM

## 2023-11-27 DIAGNOSIS — M25.511 ACUTE PAIN OF RIGHT SHOULDER: Primary | ICD-10-CM

## 2023-11-27 DIAGNOSIS — R73.03 PREDIABETES: Primary | ICD-10-CM

## 2023-11-27 DIAGNOSIS — H33.103 BILATERAL RETINOSCHISIS: ICD-10-CM

## 2023-11-27 PROCEDURE — 92015 DETERMINE REFRACTIVE STATE: CPT | Performed by: OPHTHALMOLOGY

## 2023-11-27 PROCEDURE — 92014 COMPRE OPH EXAM EST PT 1/>: CPT | Performed by: OPHTHALMOLOGY

## 2023-11-27 RX ORDER — AZELASTINE HYDROCHLORIDE 0.5 MG/ML
SOLUTION/ DROPS OPHTHALMIC
Qty: 6 ML | Refills: 11 | Status: SHIPPED | OUTPATIENT
Start: 2023-11-27

## 2023-11-27 ASSESSMENT — CUP TO DISC RATIO
OS_RATIO: 0.35
OD_RATIO: 0.25

## 2023-11-27 ASSESSMENT — VISUAL ACUITY
OS_SC: 20/30
METHOD: SNELLEN - LINEAR
OD_SC: 20/25

## 2023-11-27 ASSESSMENT — TONOMETRY
OD_IOP_MMHG: 10
OS_IOP_MMHG: 12
IOP_METHOD: GOLDMANN APPLANATION

## 2023-11-27 ASSESSMENT — REFRACTION_MANIFEST
OS_CYLINDER: SPHERE
OD_CYLINDER: SPHERE
OS_ADD: +2.50
OD_SPHERE: +0.25
OD_ADD: +2.50
OS_SPHERE: +0.25

## 2023-11-27 ASSESSMENT — ENCOUNTER SYMPTOMS: EYES NEGATIVE: 1

## 2023-11-27 NOTE — TELEPHONE ENCOUNTER
Still having right shoulder pain. Asking what her next steps are and if there is a rx she can get to help

## 2023-11-28 ENCOUNTER — OFFICE VISIT (OUTPATIENT)
Dept: SURGERY | Facility: CLINIC | Age: 73
End: 2023-11-28
Payer: MEDICARE

## 2023-11-28 VITALS — BODY MASS INDEX: 45.45 KG/M2 | HEIGHT: 62 IN | TEMPERATURE: 97.8 F | WEIGHT: 247 LBS

## 2023-11-28 DIAGNOSIS — K21.9 GASTRIC REFLUX: ICD-10-CM

## 2023-11-28 DIAGNOSIS — Z98.84 GASTRIC BYPASS STATUS FOR OBESITY: Primary | ICD-10-CM

## 2023-11-28 PROCEDURE — 1036F TOBACCO NON-USER: CPT | Performed by: SURGERY

## 2023-11-28 PROCEDURE — 1159F MED LIST DOCD IN RCRD: CPT | Performed by: SURGERY

## 2023-11-28 PROCEDURE — 1126F AMNT PAIN NOTED NONE PRSNT: CPT | Performed by: SURGERY

## 2023-11-28 PROCEDURE — 1160F RVW MEDS BY RX/DR IN RCRD: CPT | Performed by: SURGERY

## 2023-11-28 PROCEDURE — 99213 OFFICE O/P EST LOW 20 MIN: CPT | Performed by: SURGERY

## 2023-11-28 ASSESSMENT — ENCOUNTER SYMPTOMS
SHORTNESS OF BREATH: 0
AGITATION: 0
CHILLS: 0
DIZZINESS: 0
FEVER: 0
ACTIVITY CHANGE: 0
DIAPHORESIS: 0
APPETITE CHANGE: 0
CONFUSION: 0

## 2023-11-28 NOTE — PROGRESS NOTES
Subjective   Patient ID: Sarita Morelos is a 73 y.o. female who presents for follow-up of a recent CT scan.  A year ago I had taken her to the operating room after she presented with a dislodged PEG tube.  She had been operated on the week prior for small bowel obstruction and had a PEG tube placed into her remnant stomach.  The tube became dislodged and we took her back to operating room for an exploratory laparotomy and replacement of the PEG tube and repair of a small bowel enterotomy.  She had been doing well since the surgery.  This past September she developed some epigastric abdominal pains and had a CT scan.  At that time she was also taking some aspirin, which always irritates her stomach.  CT scan showed a small hiatal hernia as well as a midline abdominal wall bulge, possible hernia.  She presents for follow-up of the CT scan.  Overall she has been doing well.  She is eating normally, having normal bowel function, and denies any further abdominal pains.  She does not have any obstructive symptoms.      Review of Systems   Constitutional:  Negative for activity change, appetite change, chills, diaphoresis and fever.   Respiratory:  Negative for shortness of breath.    Cardiovascular:  Negative for chest pain.   Neurological:  Negative for dizziness.   Psychiatric/Behavioral:  Negative for agitation and confusion.    All other systems reviewed and are negative.      Objective   Physical Exam  HENT:      Head: Normocephalic.   Pulmonary:      Effort: Pulmonary effort is normal.   Abdominal:      General: Abdomen is flat.      Palpations: Abdomen is soft.      Comments: Along the midline incision she does have a diastases of the muscles.  No clear hernia defect.  Abdomen is otherwise soft, nontender, and nondistended.   Neurological:      General: No focal deficit present.      Mental Status: She is alert.   Psychiatric:         Mood and Affect: Mood normal.         Assessment/Plan   The  patient is a 73-year-old woman with an extensive past surgical history including prior gastric bypass, who presented last year with a small bowel obstruction and was taken to the operating room by one of my partners for a lysis of adhesions and PEG tube placement into her remnant stomach.  She presented 1 week following the surgery with PEG tube dislodgment.  I took her back to the operating room for an exploratory laparotomy, gastrostomy tube replacement, and repair of a small bowel enterotomy from the initial surgery.  She has been doing well since that surgery and her recovery.  Back in September she had some abdominal pain and underwent a CT scan.  CT scan shows a small hiatal hernia, and a midline abdominal wall diastases.  I do not feel any discernible hernia defect.  She is overall asymptomatic.  Her biggest concern today is some pain she has been having with her right shoulder after she fell twice on Halloween day.  She is yet to see an orthopedic surgeon, and is trying to get an MRI, but needs to have her InterStim device removed.  Will make a referral to orthopedic surgery.  I explained her to be careful about taking NSAIDs, as these can lead to ulceration of her gastrojejunal anastomosis.  The patient voiced understanding.  She can follow-up with us as needed.

## 2023-11-29 DIAGNOSIS — M25.511 ACUTE PAIN OF RIGHT SHOULDER: Primary | ICD-10-CM

## 2023-11-30 ENCOUNTER — HOSPITAL ENCOUNTER (OUTPATIENT)
Dept: RADIOLOGY | Facility: HOSPITAL | Age: 73
Discharge: HOME | End: 2023-11-30
Payer: MEDICARE

## 2023-11-30 ENCOUNTER — OFFICE VISIT (OUTPATIENT)
Dept: ORTHOPEDIC SURGERY | Facility: HOSPITAL | Age: 73
End: 2023-11-30
Payer: MEDICARE

## 2023-11-30 VITALS — WEIGHT: 247 LBS | BODY MASS INDEX: 45.45 KG/M2 | HEIGHT: 62 IN

## 2023-11-30 DIAGNOSIS — M25.511 ACUTE PAIN OF RIGHT SHOULDER: ICD-10-CM

## 2023-11-30 DIAGNOSIS — M19.019 GLENOHUMERAL ARTHRITIS: ICD-10-CM

## 2023-11-30 DIAGNOSIS — S46.011A TRAUMATIC TEAR OF RIGHT ROTATOR CUFF, UNSPECIFIED TEAR EXTENT, INITIAL ENCOUNTER: Primary | ICD-10-CM

## 2023-11-30 PROBLEM — E11.22 TYPE 2 DIABETES MELLITUS WITH CHRONIC KIDNEY DISEASE, WITHOUT LONG-TERM CURRENT USE OF INSULIN, UNSPECIFIED CKD STAGE (MULTI): Status: ACTIVE | Noted: 2023-11-30

## 2023-11-30 PROCEDURE — 1036F TOBACCO NON-USER: CPT | Performed by: PHYSICIAN ASSISTANT

## 2023-11-30 PROCEDURE — 99203 OFFICE O/P NEW LOW 30 MIN: CPT | Performed by: PHYSICIAN ASSISTANT

## 2023-11-30 PROCEDURE — 1126F AMNT PAIN NOTED NONE PRSNT: CPT | Performed by: PHYSICIAN ASSISTANT

## 2023-11-30 PROCEDURE — 99213 OFFICE O/P EST LOW 20 MIN: CPT | Performed by: PHYSICIAN ASSISTANT

## 2023-11-30 PROCEDURE — 73030 X-RAY EXAM OF SHOULDER: CPT | Mod: RIGHT SIDE | Performed by: RADIOLOGY

## 2023-11-30 PROCEDURE — 1160F RVW MEDS BY RX/DR IN RCRD: CPT | Performed by: PHYSICIAN ASSISTANT

## 2023-11-30 PROCEDURE — 1159F MED LIST DOCD IN RCRD: CPT | Performed by: PHYSICIAN ASSISTANT

## 2023-11-30 PROCEDURE — 3044F HG A1C LEVEL LT 7.0%: CPT | Performed by: PHYSICIAN ASSISTANT

## 2023-11-30 PROCEDURE — 73030 X-RAY EXAM OF SHOULDER: CPT | Mod: RT

## 2023-11-30 ASSESSMENT — PAIN - FUNCTIONAL ASSESSMENT: PAIN_FUNCTIONAL_ASSESSMENT: 0-10

## 2023-11-30 ASSESSMENT — PAIN SCALES - GENERAL: PAINLEVEL_OUTOF10: 10 - WORST POSSIBLE PAIN

## 2023-11-30 NOTE — PROGRESS NOTES
Subjective    Patient ID: Sarita Morelos is a 73 y.o. female.    Chief Complaint: Pain of the Right Shoulder           HPI:  Sarita Morelos is a 73 y.o. female who presents today for evaluation of her right shoulder.  She states that she had 2 separate falls, 1 on 10/31/2023 and another the following day.  She landed on her right side.  Initially she was more concerned about an injury to her head and face but as this improved she started noticing worsening pain and weakness in her right shoulder.  She present reports symptoms are worse with any type of activity especially thing lifting her arm away from her body or overhead.  She has been using over-the-counter medicine and topical creams which helped minimally with her discomfort.  She states that prior to this injury her shoulder had been functioning normally.    ROS  Constitutional: No fever, no chills, not feeling tired, no recent weight gain and no recent weight loss  ENT: No nosebleeds  Cardiovascular: No chest pain  Respiratory: No shortness of breath and no cough  Gastrointestinal: No abdominal pain, no nausea, no diarrhea, and no vomiting  Musculoskeletal: No arthralgias  Integumentary: No rashes and no skin lesions  Neurological: No headache  Psychiatric: No sleep disturbances no depression  Endocrine: No muscle weakness and no muscle cramps  Hematologic/lymphatic: No swelling glands and no tendency for easy bruising    Past Medical History:   Diagnosis Date    Abnormal weight gain 03/15/2018    Weight gain following gastric bypass surgery    Arrhythmia     tachycardia post op 7/2022 - evaluated and placed on metoprolol with no further issues    Arthritis     Body mass index (BMI) 45.0-49.9, adult (CMS/[facility]) 07/06/2022    Body mass index (BMI) of 45.0 to 49.9 in adult    Body mass index (BMI)40.0-44.9, adult 03/03/2021    BMI 40.0-44.9, adult    Cervical myelopathy (CMS/[facility])     Chronic lumbar radiculopathy     CKD (chronic  kidney disease)     stage 3    Conjunctival pigmentations, bilateral 10/03/2022    Conjunctival melanosis of both eyes    Depression     Dry eye syndrome of bilateral lacrimal glands 10/03/2022    Dry eyes, bilateral    Gastro-esophageal reflux disease without esophagitis 03/15/2018    Gastric reflux    Gross hematuria 06/05/2020    Gross hematuria    Heart murmur     Hiatal hernia     HLD (hyperlipidemia)     HTN (hypertension)     Iron deficiency anemia, unspecified 04/08/2022    Iron deficiency anemia, unspecified iron deficiency anemia type    Kidney stones     Morbid (severe) obesity due to excess calories (CMS/[facility]) 07/06/2022    Class 3 severe obesity with serious comorbidity and body mass index (BMI) of 45.0 to 49.9 in adult, unspecified obesity type    Myopia, bilateral 10/03/2022    Myopia of both eyes with astigmatism and presbyopia    Nephrolithiasis     OA (osteoarthritis)     OAB (overactive bladder)     Osteopenia     Osteoporosis     Other conditions influencing health status     Herniated Disc (C5 - C6)    Other conditions influencing health status     Multiple Renal Cysts    Other conditions influencing health status 05/22/2015    History of pregnancy    Pain in left shoulder 03/19/2016    Left shoulder pain, unspecified chronicity    Pain in unspecified shoulder 07/15/2015    Shoulder pain    Personal history of other diseases of the nervous system and sense organs 07/30/2014    History of sleep apnea    Personal history of other drug therapy 05/23/2018    History of pneumococcal vaccination    Personal history of other specified conditions 04/25/2014    History of fatigue    Polyp of colon 05/23/2018    Benign colon polyp    Prediabetes 07/23/2015    Prediabetes    Prediabetes 10/03/2022    Pre-diabetes, A1C= 6.0% on 5/1/23    Spinal stenosis     Spondylolisthesis     Thyrotoxicosis with diffuse goiter without thyrotoxic crisis or storm 03/15/2018    Goiter with hyperthyroidism    Thyrotoxicosis,  unspecified without thyrotoxic crisis or storm 03/15/2018    Overactive thyroid gland    Type 2 diabetes mellitus without complications (CMS/[facility]) 09/29/2015    Controlled diabetes mellitus type II without complication    Unspecified astigmatism, unspecified eye 03/15/2018    Astigmatism with presbyopia    Unspecified retinoschisis, right eye 05/21/2018    Retinoschisis of right eye    Unspecified visual loss 04/25/2014    Vision problems    Vitamin D deficiency         Past Surgical History:   Procedure Laterality Date    APPENDECTOMY  11/15/2017    Appendectomy    BLADDER SUSPENSION      mid urethral sling    CARPAL TUNNEL RELEASE  03/29/2013    Neuroplasty Decompression Median Nerve At Carpal Tunnel    CATARACT EXTRACTION Bilateral     CERVICAL DISCECTOMY  07/01/2015    Spinal Diskectomy Cervical    CHOLECYSTECTOMY      COLONOSCOPY  05/31/2018    Complete Colonoscopy    CYSTOCELE REPAIR      GASTRIC BYPASS  08/22/2016    Gastric Surgery For Morbid Obesity Gastric Bypass    LITHOTRIPSY  02/07/2014    Renal Lithotripsy    LUMBAR LAMINECTOMY      OTHER SURGICAL HISTORY  10/2021    interstim placement    TOTAL KNEE ARTHROPLASTY  10/04/2018    Knee Replacement, right          Current Outpatient Medications:     acetaminophen (Tylenol 8 HOUR) 650 mg ER tablet, Take 1 tablet (650 mg) by mouth every 8 hours if needed for mild pain (1 - 3). Do not crush, chew, or split., Disp: , Rfl:     amLODIPine (Norvasc) 10 mg tablet, Take 1 tablet (10 mg) by mouth once daily., Disp: 30 tablet, Rfl: 3    aspirin-acetaminophen-caffeine (Excedrin Migraine) 250-250-65 mg tablet, Take 1 tablet by mouth every 6 hours if needed for headaches., Disp: , Rfl:     atorvastatin (Lipitor) 40 mg tablet, TAKE 1 TABLET (40 MG) BY MOUTH ONCE DAILY AT BEDTIME., Disp: 90 tablet, Rfl: 0    azelastine (Optivar) 0.05 % ophthalmic solution, 1 drop both eyes up to 2 times a day as needed for itching/allergies, Disp: 6 mL, Rfl: 11    cholecalciferol (Vitamin  D-3) 25 MCG (1000 UT) tablet, Take 1 tablet (25 mcg) by mouth once daily., Disp: , Rfl:     diclofenac sodium (Voltaren) 1 % gel gel, Apply 1 Application topically 4 times a day., Disp: 100 g, Rfl: 1    DULoxetine (Cymbalta) 60 mg DR capsule, Take 2 capsules (120 mg) by mouth once daily., Disp: 180 capsule, Rfl: 0    estradiol (Estrace) 0.01 % (0.1 mg/gram) vaginal cream, Insert into the vagina if needed., Disp: , Rfl:     ferrous sulfate 325 (65 Fe) MG tablet, Take 1 tablet (325 mg) by mouth 2 times a week., Disp: , Rfl:     ibuprofen 200 mg tablet, Take 1 tablet (200 mg) by mouth every 6 hours if needed for mild pain (1 - 3)., Disp: , Rfl:     Lactobacillus acidoph-L.bulgar 1 million cell tablet tablet, Take by mouth 2 times a week., Disp: , Rfl:     meloxicam (Mobic) 15 mg tablet, Take 1 tablet (15 mg) by mouth once daily., Disp: 30 tablet, Rfl: 11    metoprolol tartrate (Lopressor) 75 mg tablet, Take 1 tablet (75 mg) by mouth once daily., Disp: 90 tablet, Rfl: 1    multivit with minerals/lutein (MULTIVITAMIN 50 PLUS ORAL), Take 1 tablet by mouth 3 times a week., Disp: , Rfl:     omeprazole (PriLOSEC) 20 mg DR capsule, Take 1 capsule (20 mg) by mouth once daily., Disp: , Rfl:     semaglutide 0.25 mg or 0.5 mg (2 mg/3 mL) pen injector, Inject 0.5 mg under the skin 1 (one) time per week. (Patient taking differently: Inject 0.5 mg under the skin 1 (one) time per week. Last dose 11/11/23), Disp: 6.42 mL, Rfl: 2    turmeric root extract 500 mg capsule, Take 1 capsule by mouth once daily., Disp: , Rfl:      Allergies   Allergen Reactions    Gabapentin Swelling    Nitrofurantoin Swelling    Sulfamethoxazole-Trimethoprim Unknown and Itching        Social Connections: Not on file          Objective   73-year-old female well appearing in no acute distress. Alert and oriented ×3.  Skin intact bilateral upper extremities.   Antalgic gait. Coordination and balance intact.  Bilateral upper extremity compartments supple.  C2  through C7 sensation intact bilaterally.  2+ distal pulses bilaterally.  Right shoulder with painful arc beginning at 90 degrees of abduction.  When she gets greater than 100 degrees of abduction her pain improved somewhat.  She has a positive empty can on the right, negative on the left.  2 out of 5 strength with external rotation on the right when compared to the left.  Positive belly press test on the right.    Image Results:  X-rays of the right shoulder taken today in the office were reviewed.  These showed moderate degenerative changes the glenohumeral joint with a slightly high riding humeral head.      Assessment/Plan   Encounter Diagnoses:  Traumatic tear of right rotator cuff, unspecified tear extent, initial encounter    Acute pain of right shoulder    Glenohumeral arthritis    Orders Placed This Encounter    CT shoulder right wo IV contrast       I am concerned that she has a traumatic tear to her rotator cuff along with glenohumeral joint osteoarthritis.  She is interested in surgical intervention.  This would more than likely be an arthroplasty.  She has a bladder stem in place and cannot have an MRI at this time.  We are going to have her obtain a CT scan of the right shoulder.  This is for presurgical planning purposes and should be performed at the hospital setting so the surgeon has access to the images.  Once the  CT scan has been completed she is going to follow-up with Dr. Pendleton or Naa Vernon NP to review surgical options.    This office note was dictated using Dragon voice to text software and was not proofread for spelling or grammatical errors

## 2023-12-06 ENCOUNTER — ANCILLARY PROCEDURE (OUTPATIENT)
Dept: RADIOLOGY | Facility: CLINIC | Age: 73
End: 2023-12-06
Payer: MEDICARE

## 2023-12-06 DIAGNOSIS — S46.011A TRAUMATIC TEAR OF RIGHT ROTATOR CUFF, UNSPECIFIED TEAR EXTENT, INITIAL ENCOUNTER: ICD-10-CM

## 2023-12-06 DIAGNOSIS — M19.019 GLENOHUMERAL ARTHRITIS: ICD-10-CM

## 2023-12-06 DIAGNOSIS — Z12.11 COLON CANCER SCREENING: Primary | ICD-10-CM

## 2023-12-06 PROCEDURE — 73200 CT UPPER EXTREMITY W/O DYE: CPT | Mod: RT

## 2023-12-06 PROCEDURE — 73200 CT UPPER EXTREMITY W/O DYE: CPT | Mod: RIGHT SIDE | Performed by: RADIOLOGY

## 2023-12-06 RX ORDER — POLYETHYLENE GLYCOL 3350, SODIUM SULFATE ANHYDROUS, SODIUM BICARBONATE, SODIUM CHLORIDE, POTASSIUM CHLORIDE 236; 22.74; 6.74; 5.86; 2.97 G/4L; G/4L; G/4L; G/4L; G/4L
4000 POWDER, FOR SOLUTION ORAL ONCE
Qty: 4000 ML | Refills: 0 | Status: SHIPPED | OUTPATIENT
Start: 2023-12-06 | End: 2023-12-06

## 2023-12-10 RX ORDER — SODIUM CHLORIDE, SODIUM LACTATE, POTASSIUM CHLORIDE, CALCIUM CHLORIDE 600; 310; 30; 20 MG/100ML; MG/100ML; MG/100ML; MG/100ML
20 INJECTION, SOLUTION INTRAVENOUS CONTINUOUS
Status: CANCELLED | OUTPATIENT
Start: 2023-12-10

## 2023-12-11 ENCOUNTER — ANESTHESIA EVENT (OUTPATIENT)
Dept: GASTROENTEROLOGY | Facility: HOSPITAL | Age: 73
End: 2023-12-11
Payer: MEDICARE

## 2023-12-11 ENCOUNTER — ANESTHESIA (OUTPATIENT)
Dept: GASTROENTEROLOGY | Facility: HOSPITAL | Age: 73
End: 2023-12-11
Payer: MEDICARE

## 2023-12-11 ENCOUNTER — HOSPITAL ENCOUNTER (OUTPATIENT)
Dept: GASTROENTEROLOGY | Facility: HOSPITAL | Age: 73
Setting detail: OUTPATIENT SURGERY
Discharge: HOME | End: 2023-12-11
Payer: MEDICARE

## 2023-12-11 VITALS
OXYGEN SATURATION: 98 % | BODY MASS INDEX: 46.37 KG/M2 | HEIGHT: 62 IN | RESPIRATION RATE: 16 BRPM | TEMPERATURE: 99 F | DIASTOLIC BLOOD PRESSURE: 82 MMHG | HEART RATE: 77 BPM | WEIGHT: 251.99 LBS | SYSTOLIC BLOOD PRESSURE: 157 MMHG

## 2023-12-11 DIAGNOSIS — Z86.010 PERSONAL HISTORY OF COLONIC POLYPS: Primary | ICD-10-CM

## 2023-12-11 DIAGNOSIS — Z12.11 COLON CANCER SCREENING: ICD-10-CM

## 2023-12-11 PROBLEM — F03.90 DEMENTIA (MULTI): Status: ACTIVE | Noted: 2023-12-11

## 2023-12-11 LAB
GLUCOSE BLD MANUAL STRIP-MCNC: 75 MG/DL (ref 74–99)
GLUCOSE BLD MANUAL STRIP-MCNC: 82 MG/DL (ref 74–99)

## 2023-12-11 PROCEDURE — A45378 PR COLONOSCOPY,DIAGNOSTIC: Performed by: STUDENT IN AN ORGANIZED HEALTH CARE EDUCATION/TRAINING PROGRAM

## 2023-12-11 PROCEDURE — 2500000004 HC RX 250 GENERAL PHARMACY W/ HCPCS (ALT 636 FOR OP/ED): Performed by: ANESTHESIOLOGIST ASSISTANT

## 2023-12-11 PROCEDURE — 99100 ANES PT EXTEME AGE<1 YR&>70: CPT | Performed by: STUDENT IN AN ORGANIZED HEALTH CARE EDUCATION/TRAINING PROGRAM

## 2023-12-11 PROCEDURE — 45380 COLONOSCOPY AND BIOPSY: CPT | Performed by: INTERNAL MEDICINE

## 2023-12-11 PROCEDURE — 3700000001 HC GENERAL ANESTHESIA TIME - INITIAL BASE CHARGE

## 2023-12-11 PROCEDURE — 88305 TISSUE EXAM BY PATHOLOGIST: CPT | Performed by: PATHOLOGY

## 2023-12-11 PROCEDURE — 7100000010 HC PHASE TWO TIME - EACH INCREMENTAL 1 MINUTE

## 2023-12-11 PROCEDURE — 45385 COLONOSCOPY W/LESION REMOVAL: CPT | Performed by: INTERNAL MEDICINE

## 2023-12-11 PROCEDURE — 82947 ASSAY GLUCOSE BLOOD QUANT: CPT

## 2023-12-11 PROCEDURE — 3700000002 HC GENERAL ANESTHESIA TIME - EACH INCREMENTAL 1 MINUTE

## 2023-12-11 PROCEDURE — A45378 PR COLONOSCOPY,DIAGNOSTIC: Performed by: ANESTHESIOLOGIST ASSISTANT

## 2023-12-11 PROCEDURE — 88305 TISSUE EXAM BY PATHOLOGIST: CPT | Mod: TC,SUR,AHULAB | Performed by: INTERNAL MEDICINE

## 2023-12-11 PROCEDURE — 7100000009 HC PHASE TWO TIME - INITIAL BASE CHARGE

## 2023-12-11 PROCEDURE — 2500000005 HC RX 250 GENERAL PHARMACY W/O HCPCS: Performed by: ANESTHESIOLOGIST ASSISTANT

## 2023-12-11 RX ORDER — PROPOFOL 10 MG/ML
INJECTION, EMULSION INTRAVENOUS CONTINUOUS PRN
Status: DISCONTINUED | OUTPATIENT
Start: 2023-12-11 | End: 2023-12-11

## 2023-12-11 RX ORDER — LIDOCAINE HYDROCHLORIDE 20 MG/ML
INJECTION, SOLUTION EPIDURAL; INFILTRATION; INTRACAUDAL; PERINEURAL AS NEEDED
Status: DISCONTINUED | OUTPATIENT
Start: 2023-12-11 | End: 2023-12-11

## 2023-12-11 RX ADMIN — SODIUM CHLORIDE, SODIUM LACTATE, POTASSIUM CHLORIDE, AND CALCIUM CHLORIDE: 600; 310; 30; 20 INJECTION, SOLUTION INTRAVENOUS at 09:45

## 2023-12-11 RX ADMIN — PROPOFOL 125 MCG/KG/MIN: 10 INJECTION, EMULSION INTRAVENOUS at 09:47

## 2023-12-11 RX ADMIN — GLYCOPYRROLATE 0.2 MCG: 0.2 INJECTION, SOLUTION INTRAMUSCULAR; INTRAVITREAL at 09:46

## 2023-12-11 RX ADMIN — LIDOCAINE HYDROCHLORIDE 100 MG: 20 INJECTION, SOLUTION EPIDURAL; INFILTRATION; INTRACAUDAL; PERINEURAL at 09:47

## 2023-12-11 SDOH — HEALTH STABILITY: MENTAL HEALTH: CURRENT SMOKER: 0

## 2023-12-11 ASSESSMENT — PAIN SCALES - GENERAL
PAINLEVEL_OUTOF10: 0 - NO PAIN

## 2023-12-11 ASSESSMENT — COLUMBIA-SUICIDE SEVERITY RATING SCALE - C-SSRS
6. HAVE YOU EVER DONE ANYTHING, STARTED TO DO ANYTHING, OR PREPARED TO DO ANYTHING TO END YOUR LIFE?: NO
1. IN THE PAST MONTH, HAVE YOU WISHED YOU WERE DEAD OR WISHED YOU COULD GO TO SLEEP AND NOT WAKE UP?: NO
2. HAVE YOU ACTUALLY HAD ANY THOUGHTS OF KILLING YOURSELF?: NO

## 2023-12-11 ASSESSMENT — PAIN - FUNCTIONAL ASSESSMENT
PAIN_FUNCTIONAL_ASSESSMENT: 0-10

## 2023-12-11 NOTE — DISCHARGE INSTRUCTIONS

## 2023-12-11 NOTE — ANESTHESIA POSTPROCEDURE EVALUATION
Patient: Sarita Morelos    Procedure Summary       Date: 12/11/23 Room / Location: [facility]    Anesthesia Start: 0945 Anesthesia Stop: 1010    Procedure: COLONOSCOPY Diagnosis: Personal history of colonic polyps    Scheduled Providers: Miguel Patel DO; Tamar Del Real MA; Catherine Herrera MD; KAM Byrd; Afua Rhoades RN Responsible Provider: Catherine Herrera MD    Anesthesia Type: MAC ASA Status: 3            Anesthesia Type: MAC    Vitals Value Taken Time   /82 12/11/23 1036   Temp 37.2 °C (99 °F) 12/11/23 1007   Pulse 77 12/11/23 1036   Resp 16 12/11/23 1036   SpO2 98 % 12/11/23 1036       Anesthesia Post Evaluation    Patient location during evaluation: PACU  Patient participation: complete - patient participated  Level of consciousness: awake and alert  Pain management: adequate  Airway patency: patent  Cardiovascular status: acceptable  Respiratory status: acceptable  Hydration status: acceptable  Postoperative Nausea and Vomiting: none  Comments: Denies nausea         There were no known notable events for this encounter.

## 2023-12-11 NOTE — ANESTHESIA PREPROCEDURE EVALUATION
Patient: Sarita Morelos    Procedure Information       Date/Time: 12/11/23 0950    Scheduled providers: Miguel Patel DO; Tamar Del Real MA; Catherine Herrera MD; KAM Byrd; Afua Rhoades RN    Procedure: COLONOSCOPY    Location: [facility]            Relevant Problems   Anesthesia (within normal limits)      Cardiovascular   (+) Heart murmur   (+) Hypertension      Endocrine   (+) Goiter with hyperthyroidism   (+) Morbid obesity (CMS/HCC)   (+) Type 2 diabetes mellitus with chronic kidney disease, without long-term current use of insulin, unspecified CKD stage (CMS/HCC)      /Renal   (+) Calculus of kidney   (+) Chronic renal disease, stage III (CMS/HCC)   (+) Recurrent UTI      Neuro/Psych   (+) Carpal tunnel syndrome   (+) Chronic lumbar radiculopathy   (+) Depression   (+) Major depressive disorder with single episode      Pulmonary (within normal limits)      GI/Hepatic   (+) Symptomatic cholelithiasis      Hematology   (+) Absolute anemia   (+) Iron deficiency anemia      Musculoskeletal   (+) Carpal tunnel syndrome   (+) Lumbar stenosis with neurogenic claudication   (+) Osteoarthritis of knee   (+) Spinal stenosis      Infectious Disease   (+) Recurrent UTI      Other   (+) Arthritis of left knee   (+) Glenohumeral arthritis       Clinical information reviewed:   Tobacco  Allergies  Meds   Med Hx  Surg Hx  OB Status  Fam Hx  Soc   Hx        NPO Detail:  NPO/Void Status  Carbonhydrate Drink Given Prior to Surgery? : N  Date of Last Liquid: 12/11/23  Time of Last Liquid: 0700  Date of Last Solid: 12/09/23  Time of Last Solid: 2200  Last Intake Type: Clear fluids  Time of Last Void: 0900         Vitals:    12/11/23 0927   BP: 154/71   Pulse: 83   Resp: 16   Temp: 38.2 °C (100.8 °F)   SpO2: 96%       Past Surgical History:   Procedure Laterality Date    APPENDECTOMY  11/15/2017    Appendectomy    BLADDER SUSPENSION      mid urethral sling     CARPAL TUNNEL RELEASE  03/29/2013    Neuroplasty Decompression Median Nerve At Carpal Tunnel    CATARACT EXTRACTION Bilateral     CERVICAL DISCECTOMY  07/01/2015    Spinal Diskectomy Cervical    CHOLECYSTECTOMY      COLONOSCOPY  05/31/2018    Complete Colonoscopy    CYSTOCELE REPAIR      GASTRIC BYPASS  08/22/2016    Gastric Surgery For Morbid Obesity Gastric Bypass    LITHOTRIPSY  02/07/2014    Renal Lithotripsy    LUMBAR LAMINECTOMY      OTHER SURGICAL HISTORY  10/2021    interstim placement    TOTAL KNEE ARTHROPLASTY  10/04/2018    Knee Replacement, right     Past Medical History:   Diagnosis Date    Abnormal weight gain 03/15/2018    Weight gain following gastric bypass surgery    Arrhythmia     tachycardia post op 7/2022 - evaluated and placed on metoprolol with no further issues    Arthritis     Body mass index (BMI) 45.0-49.9, adult (CMS/[facility]) 07/06/2022    Body mass index (BMI) of 45.0 to 49.9 in adult    Body mass index (BMI)40.0-44.9, adult 03/03/2021    BMI 40.0-44.9, adult    Cervical myelopathy (CMS/[facility])     Chronic lumbar radiculopathy     CKD (chronic kidney disease)     stage 3    Conjunctival pigmentations, bilateral 10/03/2022    Conjunctival melanosis of both eyes    Depression     Dry eye syndrome of bilateral lacrimal glands 10/03/2022    Dry eyes, bilateral    Gastro-esophageal reflux disease without esophagitis 03/15/2018    Gastric reflux    Gross hematuria 06/05/2020    Gross hematuria    Heart murmur     Hiatal hernia     HLD (hyperlipidemia)     HTN (hypertension)     Iron deficiency anemia, unspecified 04/08/2022    Iron deficiency anemia, unspecified iron deficiency anemia type    Kidney stones     Morbid (severe) obesity due to excess calories (CMS/[facility]) 07/06/2022    Class 3 severe obesity with serious comorbidity and body mass index (BMI) of 45.0 to 49.9 in adult, unspecified obesity type    Myopia, bilateral 10/03/2022    Myopia of both eyes with astigmatism and presbyopia     Nephrolithiasis     OA (osteoarthritis)     OAB (overactive bladder)     Osteopenia     Osteoporosis     Other conditions influencing health status     Herniated Disc (C5 - C6)    Other conditions influencing health status     Multiple Renal Cysts    Other conditions influencing health status 05/22/2015    History of pregnancy    Pain in left shoulder 03/19/2016    Left shoulder pain, unspecified chronicity    Pain in unspecified shoulder 07/15/2015    Shoulder pain    Personal history of other diseases of the nervous system and sense organs 07/30/2014    History of sleep apnea    Personal history of other drug therapy 05/23/2018    History of pneumococcal vaccination    Personal history of other specified conditions 04/25/2014    History of fatigue    Polyp of colon 05/23/2018    Benign colon polyp    Prediabetes 07/23/2015    Prediabetes    Prediabetes 10/03/2022    Pre-diabetes, A1C= 6.0% on 5/1/23    Spinal stenosis     Spondylolisthesis     Thyrotoxicosis with diffuse goiter without thyrotoxic crisis or storm 03/15/2018    Goiter with hyperthyroidism    Thyrotoxicosis, unspecified without thyrotoxic crisis or storm 03/15/2018    Overactive thyroid gland    Type 2 diabetes mellitus without complications (CMS/[facility]) 09/29/2015    Controlled diabetes mellitus type II without complication    Unspecified astigmatism, unspecified eye 03/15/2018    Astigmatism with presbyopia    Unspecified retinoschisis, right eye 05/21/2018    Retinoschisis of right eye    Unspecified visual loss 04/25/2014    Vision problems    Vitamin D deficiency        Current Outpatient Medications:     acetaminophen (Tylenol 8 HOUR) 650 mg ER tablet, Take 1 tablet (650 mg) by mouth every 8 hours if needed for mild pain (1 - 3). Do not crush, chew, or split., Disp: , Rfl:     amLODIPine (Norvasc) 10 mg tablet, Take 1 tablet (10 mg) by mouth once daily., Disp: 30 tablet, Rfl: 3    aspirin-acetaminophen-caffeine (Excedrin Migraine) 250-250-65 mg  tablet, Take 1 tablet by mouth every 6 hours if needed for headaches., Disp: , Rfl:     atorvastatin (Lipitor) 40 mg tablet, TAKE 1 TABLET (40 MG) BY MOUTH ONCE DAILY AT BEDTIME., Disp: 90 tablet, Rfl: 0    azelastine (Optivar) 0.05 % ophthalmic solution, 1 drop both eyes up to 2 times a day as needed for itching/allergies, Disp: 6 mL, Rfl: 11    cholecalciferol (Vitamin D-3) 25 MCG (1000 UT) tablet, Take 1 tablet (25 mcg) by mouth once daily., Disp: , Rfl:     diclofenac sodium (Voltaren) 1 % gel gel, Apply 1 Application topically 4 times a day., Disp: 100 g, Rfl: 1    DULoxetine (Cymbalta) 60 mg DR capsule, Take 2 capsules (120 mg) by mouth once daily., Disp: 180 capsule, Rfl: 0    ferrous sulfate 325 (65 Fe) MG tablet, Take 1 tablet by mouth 2 times a week., Disp: , Rfl:     ibuprofen 200 mg tablet, Take 1 tablet (200 mg) by mouth every 6 hours if needed for mild pain (1 - 3)., Disp: , Rfl:     Lactobacillus acidoph-L.bulgar 1 million cell tablet tablet, Take by mouth 2 times a week., Disp: , Rfl:     meloxicam (Mobic) 15 mg tablet, Take 1 tablet (15 mg) by mouth once daily., Disp: 30 tablet, Rfl: 11    metoprolol tartrate (Lopressor) 75 mg tablet, Take 1 tablet (75 mg) by mouth once daily., Disp: 90 tablet, Rfl: 1    multivit with minerals/lutein (MULTIVITAMIN 50 PLUS ORAL), Take 1 tablet by mouth 3 times a week., Disp: , Rfl:     omeprazole (PriLOSEC) 20 mg DR capsule, Take 1 capsule (20 mg) by mouth once daily., Disp: , Rfl:     semaglutide 0.25 mg or 0.5 mg (2 mg/3 mL) pen injector, Inject 0.5 mg under the skin 1 (one) time per week. (Patient taking differently: Inject 0.5 mg under the skin 1 (one) time per week. Last dose 11/11/23), Disp: 6.42 mL, Rfl: 2    turmeric root extract 500 mg capsule, Take 1 capsule by mouth once daily., Disp: , Rfl:     estradiol (Estrace) 0.01 % (0.1 mg/gram) vaginal cream, Insert into the vagina if needed., Disp: , Rfl:   Prior to Admission medications    Medication Sig Start  Date End Date Taking? Authorizing Provider   acetaminophen (Tylenol 8 HOUR) 650 mg ER tablet Take 1 tablet (650 mg) by mouth every 8 hours if needed for mild pain (1 - 3). Do not crush, chew, or split.   Yes Historical Provider, MD   amLODIPine (Norvasc) 10 mg tablet Take 1 tablet (10 mg) by mouth once daily. 11/8/23  Yes Alka Lea MD   aspirin-acetaminophen-caffeine (Excedrin Migraine) 250-250-65 mg tablet Take 1 tablet by mouth every 6 hours if needed for headaches.   Yes Historical Provider, MD   atorvastatin (Lipitor) 40 mg tablet TAKE 1 TABLET (40 MG) BY MOUTH ONCE DAILY AT BEDTIME. 9/20/23  Yes Marilee Hodges MD   azelastine (Optivar) 0.05 % ophthalmic solution 1 drop both eyes up to 2 times a day as needed for itching/allergies 11/27/23  Yes Bebe Quintana MD   cholecalciferol (Vitamin D-3) 25 MCG (1000 UT) tablet Take 1 tablet (25 mcg) by mouth once daily.   Yes Historical Provider, MD   diclofenac sodium (Voltaren) 1 % gel gel Apply 1 Application topically 4 times a day. 11/8/23  Yes Alka Lea MD   DULoxetine (Cymbalta) 60 mg DR capsule Take 2 capsules (120 mg) by mouth once daily. 6/7/23  Yes Marilee Hodges MD   ferrous sulfate 325 (65 Fe) MG tablet Take 1 tablet by mouth 2 times a week.   Yes Historical Provider, MD   ibuprofen 200 mg tablet Take 1 tablet (200 mg) by mouth every 6 hours if needed for mild pain (1 - 3).   Yes Historical Provider, MD   Lactobacillus acidoph-L.bulgar 1 million cell tablet tablet Take by mouth 2 times a week. 8/26/22  Yes Historical Provider, MD   meloxicam (Mobic) 15 mg tablet Take 1 tablet (15 mg) by mouth once daily. 5/1/23 4/30/24 Yes Christie Vega MD   metoprolol tartrate (Lopressor) 75 mg tablet Take 1 tablet (75 mg) by mouth once daily. 5/1/23  Yes Christie Vega MD   multivit with minerals/lutein (MULTIVITAMIN 50 PLUS ORAL) Take 1 tablet by mouth 3 times a week.   Yes Historical Provider, MD   omeprazole (PriLOSEC) 20 mg DR capsule  Take 1 capsule (20 mg) by mouth once daily. 1/4/23  Yes Historical Provider, MD   semaglutide 0.25 mg or 0.5 mg (2 mg/3 mL) pen injector Inject 0.5 mg under the skin 1 (one) time per week.  Patient taking differently: Inject 0.5 mg under the skin 1 (one) time per week. Last dose 11/11/23 8/10/23  Yes Marilee Hodges MD   turmeric root extract 500 mg capsule Take 1 capsule by mouth once daily. 6/5/20  Yes Historical Provider, MD   estradiol (Estrace) 0.01 % (0.1 mg/gram) vaginal cream Insert into the vagina if needed. 4/28/22   Historical Provider, MD   polyethylene glycol-electrolytes (Golytely) 420 gram solution Take 4,000 mL by mouth 1 time for 1 dose. 12/6/23 12/6/23  Miguel Patel, DO     Allergies   Allergen Reactions    Gabapentin Swelling    Nitrofurantoin Swelling    Sulfamethoxazole-Trimethoprim Unknown and Itching     Social History     Tobacco Use    Smoking status: Never    Smokeless tobacco: Never   Substance Use Topics    Alcohol use: Yes     Comment: 1 glass of wine a month         Chemistry    Lab Results   Component Value Date/Time     09/12/2023 1834    K 4.2 09/12/2023 1834     09/12/2023 1834    CO2 25 09/12/2023 1834    BUN 19 09/12/2023 1834    CREATININE 1.09 (H) 09/12/2023 1834    Lab Results   Component Value Date/Time    CALCIUM 9.5 09/12/2023 1834    ALKPHOS 141 (H) 09/12/2023 1834    AST 18 09/12/2023 1834    ALT 15 09/12/2023 1834    BILITOT 0.8 09/12/2023 1834          Lab Results   Component Value Date/Time    WBC 8.0 09/12/2023 1834    HGB 13.2 09/12/2023 1834    HCT 41.5 09/12/2023 1834     09/12/2023 1834     Lab Results   Component Value Date/Time    PROTIME 14.3 (H) 09/12/2023 1834    INR 1.3 (H) 09/12/2023 1834     Encounter Date: 11/13/23   ECG 12 lead (Clinic Performed)   Result Value    Ventricular Rate 71    Atrial Rate 71    NC Interval 182    QRS Duration 84    QT Interval 392    QTC Calculation(Bazett) 425    P Axis 41    R Axis -14    T Axis 19     QRS Count 11    Q Onset 220    P Onset 129    P Offset 188    T Offset 416    QTC Fredericia 414    Narrative    Normal sinus rhythm  Possible Left atrial enlargement  Left ventricular hypertrophy  Abnormal ECG  When compared with ECG of 30-JUL-2022 13:20,  Borderline criteria for Lateral infarct are no longer Present  Confirmed by Jacob Armstrong ([id number]) on 11/15/2023 12:13:01 PM     No results found for this or any previous visit from the past 1095 days.    Physical Exam    Airway  Mallampati: II  TM distance: >3 FB  Neck ROM: full     Cardiovascular   Rhythm: regular  Rate: normal     Dental - normal exam     Pulmonary - normal exam     Abdominal   Abdomen: soft             Anesthesia Plan    ASA 3     MAC     The patient is not a current smoker.    intravenous induction   Anesthetic plan and risks discussed with patient.  Use of blood products discussed with patient who.    Plan discussed with CAA and attending.

## 2023-12-13 DIAGNOSIS — E78.5 DYSLIPIDEMIA: ICD-10-CM

## 2023-12-13 RX ORDER — ATORVASTATIN CALCIUM 40 MG/1
40 TABLET, FILM COATED ORAL NIGHTLY
Qty: 90 TABLET | Refills: 0 | Status: SHIPPED | OUTPATIENT
Start: 2023-12-13 | End: 2024-02-28 | Stop reason: SDUPTHER

## 2023-12-14 DIAGNOSIS — R39.9 GENITOURINARY SYMPTOMS: ICD-10-CM

## 2023-12-14 DIAGNOSIS — Z01.818 PREOP EXAMINATION: Primary | ICD-10-CM

## 2023-12-15 ENCOUNTER — PRE-ADMISSION TESTING (OUTPATIENT)
Dept: PREADMISSION TESTING | Facility: HOSPITAL | Age: 73
End: 2023-12-15
Payer: MEDICARE

## 2023-12-18 LAB
LABORATORY COMMENT REPORT: NORMAL
PATH REPORT.FINAL DX SPEC: NORMAL
PATH REPORT.GROSS SPEC: NORMAL
PATH REPORT.TOTAL CANCER: NORMAL

## 2023-12-19 ENCOUNTER — LAB (OUTPATIENT)
Dept: LAB | Facility: LAB | Age: 73
End: 2023-12-19
Payer: MEDICARE

## 2023-12-19 ENCOUNTER — TELEMEDICINE CLINICAL SUPPORT (OUTPATIENT)
Dept: PREADMISSION TESTING | Facility: HOSPITAL | Age: 73
End: 2023-12-19
Payer: MEDICARE

## 2023-12-19 DIAGNOSIS — N39.0 RECURRENT UTI: ICD-10-CM

## 2023-12-19 DIAGNOSIS — R39.9 GENITOURINARY SYMPTOMS: ICD-10-CM

## 2023-12-19 DIAGNOSIS — Z01.818 PREOP EXAMINATION: ICD-10-CM

## 2023-12-19 PROCEDURE — 81001 URINALYSIS AUTO W/SCOPE: CPT

## 2023-12-19 PROCEDURE — 87186 SC STD MICRODIL/AGAR DIL: CPT

## 2023-12-19 PROCEDURE — 87086 URINE CULTURE/COLONY COUNT: CPT

## 2023-12-20 LAB
APPEARANCE UR: ABNORMAL
BILIRUB UR STRIP.AUTO-MCNC: NEGATIVE MG/DL
CAOX CRY #/AREA UR COMP ASSIST: ABNORMAL /HPF
COLOR UR: YELLOW
GLUCOSE UR STRIP.AUTO-MCNC: NEGATIVE MG/DL
HOLD SPECIMEN: NORMAL
HYALINE CASTS #/AREA URNS AUTO: ABNORMAL /LPF
KETONES UR STRIP.AUTO-MCNC: ABNORMAL MG/DL
LEUKOCYTE ESTERASE UR QL STRIP.AUTO: ABNORMAL
MUCOUS THREADS #/AREA URNS AUTO: ABNORMAL /LPF
NITRITE UR QL STRIP.AUTO: NEGATIVE
PH UR STRIP.AUTO: 5 [PH]
PROT UR STRIP.AUTO-MCNC: NEGATIVE MG/DL
RBC # UR STRIP.AUTO: NEGATIVE /UL
RBC #/AREA URNS AUTO: ABNORMAL /HPF
SP GR UR STRIP.AUTO: 1.02
SQUAMOUS #/AREA URNS AUTO: ABNORMAL /HPF
UROBILINOGEN UR STRIP.AUTO-MCNC: <2 MG/DL
WBC #/AREA URNS AUTO: ABNORMAL /HPF

## 2023-12-21 ENCOUNTER — HOSPITAL ENCOUNTER (OUTPATIENT)
Facility: HOSPITAL | Age: 73
Setting detail: OUTPATIENT SURGERY
Discharge: HOME | End: 2023-12-21
Attending: OBSTETRICS & GYNECOLOGY | Admitting: OBSTETRICS & GYNECOLOGY
Payer: MEDICARE

## 2023-12-21 ENCOUNTER — ANESTHESIA EVENT (OUTPATIENT)
Dept: OPERATING ROOM | Facility: HOSPITAL | Age: 73
End: 2023-12-21
Payer: MEDICARE

## 2023-12-21 ENCOUNTER — ANESTHESIA (OUTPATIENT)
Dept: OPERATING ROOM | Facility: HOSPITAL | Age: 73
End: 2023-12-21
Payer: MEDICARE

## 2023-12-21 ENCOUNTER — APPOINTMENT (OUTPATIENT)
Dept: RADIOLOGY | Facility: HOSPITAL | Age: 73
End: 2023-12-21
Payer: MEDICARE

## 2023-12-21 VITALS
BODY MASS INDEX: 45.4 KG/M2 | TEMPERATURE: 97.3 F | HEART RATE: 67 BPM | DIASTOLIC BLOOD PRESSURE: 69 MMHG | RESPIRATION RATE: 19 BRPM | OXYGEN SATURATION: 100 % | WEIGHT: 246.69 LBS | HEIGHT: 62 IN | SYSTOLIC BLOOD PRESSURE: 139 MMHG

## 2023-12-21 DIAGNOSIS — G89.18 ACUTE POST-OPERATIVE PAIN: ICD-10-CM

## 2023-12-21 DIAGNOSIS — N39.0 RECURRENT UTI: ICD-10-CM

## 2023-12-21 DIAGNOSIS — N39.41 URGE INCONTINENCE OF URINE: Primary | ICD-10-CM

## 2023-12-21 LAB
GLUCOSE BLD MANUAL STRIP-MCNC: 100 MG/DL (ref 74–99)
GLUCOSE BLD MANUAL STRIP-MCNC: 63 MG/DL (ref 74–99)
GLUCOSE BLD MANUAL STRIP-MCNC: 84 MG/DL (ref 74–99)

## 2023-12-21 PROCEDURE — 99100 ANES PT EXTEME AGE<1 YR&>70: CPT | Performed by: ANESTHESIOLOGY

## 2023-12-21 PROCEDURE — 2720000007 HC OR 272 NO HCPCS: Performed by: OBSTETRICS & GYNECOLOGY

## 2023-12-21 PROCEDURE — 52287 CYSTOSCOPY CHEMODENERVATION: CPT | Performed by: OBSTETRICS & GYNECOLOGY

## 2023-12-21 PROCEDURE — 96372 THER/PROPH/DIAG INJ SC/IM: CPT | Performed by: OBSTETRICS & GYNECOLOGY

## 2023-12-21 PROCEDURE — 3600000008 HC OR TIME - EACH INCREMENTAL 1 MINUTE - PROCEDURE LEVEL THREE: Performed by: OBSTETRICS & GYNECOLOGY

## 2023-12-21 PROCEDURE — 3700000001 HC GENERAL ANESTHESIA TIME - INITIAL BASE CHARGE: Performed by: OBSTETRICS & GYNECOLOGY

## 2023-12-21 PROCEDURE — 64595 REV/RMV PRPH SAC/GSTR NPG/R: CPT | Performed by: OBSTETRICS & GYNECOLOGY

## 2023-12-21 PROCEDURE — A64595 PR REVISE/REMOVE PERIPH/GASTRIC NEUROSTIM/RECEIVER: Performed by: ANESTHESIOLOGIST ASSISTANT

## 2023-12-21 PROCEDURE — 82947 ASSAY GLUCOSE BLOOD QUANT: CPT

## 2023-12-21 PROCEDURE — 3700000002 HC GENERAL ANESTHESIA TIME - EACH INCREMENTAL 1 MINUTE: Performed by: OBSTETRICS & GYNECOLOGY

## 2023-12-21 PROCEDURE — A64595 PR REVISE/REMOVE PERIPH/GASTRIC NEUROSTIM/RECEIVER: Performed by: ANESTHESIOLOGY

## 2023-12-21 PROCEDURE — 2500000004 HC RX 250 GENERAL PHARMACY W/ HCPCS (ALT 636 FOR OP/ED): Performed by: OBSTETRICS & GYNECOLOGY

## 2023-12-21 PROCEDURE — 7100000002 HC RECOVERY ROOM TIME - EACH INCREMENTAL 1 MINUTE: Performed by: OBSTETRICS & GYNECOLOGY

## 2023-12-21 PROCEDURE — 2500000005 HC RX 250 GENERAL PHARMACY W/O HCPCS: Performed by: ANESTHESIOLOGIST ASSISTANT

## 2023-12-21 PROCEDURE — 3600000003 HC OR TIME - INITIAL BASE CHARGE - PROCEDURE LEVEL THREE: Performed by: OBSTETRICS & GYNECOLOGY

## 2023-12-21 PROCEDURE — 7100000009 HC PHASE TWO TIME - INITIAL BASE CHARGE: Performed by: OBSTETRICS & GYNECOLOGY

## 2023-12-21 PROCEDURE — 2500000004 HC RX 250 GENERAL PHARMACY W/ HCPCS (ALT 636 FOR OP/ED): Performed by: ANESTHESIOLOGIST ASSISTANT

## 2023-12-21 PROCEDURE — 7100000010 HC PHASE TWO TIME - EACH INCREMENTAL 1 MINUTE: Performed by: OBSTETRICS & GYNECOLOGY

## 2023-12-21 PROCEDURE — A4217 STERILE WATER/SALINE, 500 ML: HCPCS | Performed by: OBSTETRICS & GYNECOLOGY

## 2023-12-21 PROCEDURE — 7100000001 HC RECOVERY ROOM TIME - INITIAL BASE CHARGE: Performed by: OBSTETRICS & GYNECOLOGY

## 2023-12-21 PROCEDURE — 2500000005 HC RX 250 GENERAL PHARMACY W/O HCPCS: Performed by: OBSTETRICS & GYNECOLOGY

## 2023-12-21 PROCEDURE — 76000 FLUOROSCOPY <1 HR PHYS/QHP: CPT | Mod: 59

## 2023-12-21 PROCEDURE — 64585 REV/RMV PERPH NSTIM ELTRD RA: CPT | Performed by: OBSTETRICS & GYNECOLOGY

## 2023-12-21 RX ORDER — CEFAZOLIN SODIUM 2 G/100ML
2 INJECTION, SOLUTION INTRAVENOUS ONCE
Status: DISCONTINUED | OUTPATIENT
Start: 2023-12-21 | End: 2023-12-21 | Stop reason: HOSPADM

## 2023-12-21 RX ORDER — ONDANSETRON HYDROCHLORIDE 2 MG/ML
4 INJECTION, SOLUTION INTRAVENOUS ONCE AS NEEDED
Status: DISCONTINUED | OUTPATIENT
Start: 2023-12-21 | End: 2023-12-21 | Stop reason: HOSPADM

## 2023-12-21 RX ORDER — LIDOCAINE HYDROCHLORIDE 20 MG/ML
INJECTION, SOLUTION EPIDURAL; INFILTRATION; INTRACAUDAL; PERINEURAL AS NEEDED
Status: DISCONTINUED | OUTPATIENT
Start: 2023-12-21 | End: 2023-12-21

## 2023-12-21 RX ORDER — TRAMADOL HYDROCHLORIDE 50 MG/1
50 TABLET ORAL EVERY 8 HOURS PRN
Qty: 6 TABLET | Refills: 0 | Status: SHIPPED | OUTPATIENT
Start: 2023-12-21 | End: 2024-02-13 | Stop reason: WASHOUT

## 2023-12-21 RX ORDER — CEFAZOLIN 1 G/1
INJECTION, POWDER, FOR SOLUTION INTRAVENOUS AS NEEDED
Status: DISCONTINUED | OUTPATIENT
Start: 2023-12-21 | End: 2023-12-21

## 2023-12-21 RX ORDER — POLYETHYLENE GLYCOL 3350 17 G/17G
17 POWDER, FOR SOLUTION ORAL DAILY
Qty: 10 PACKET | Refills: 0 | Status: SHIPPED | OUTPATIENT
Start: 2023-12-21 | End: 2023-12-31

## 2023-12-21 RX ORDER — CIPROFLOXACIN 500 MG/1
500 TABLET ORAL 2 TIMES DAILY
Qty: 14 TABLET | Refills: 0 | Status: SHIPPED | OUTPATIENT
Start: 2023-12-21 | End: 2023-12-28

## 2023-12-21 RX ORDER — LABETALOL HYDROCHLORIDE 5 MG/ML
5 INJECTION, SOLUTION INTRAVENOUS ONCE AS NEEDED
Status: DISCONTINUED | OUTPATIENT
Start: 2023-12-21 | End: 2023-12-21 | Stop reason: HOSPADM

## 2023-12-21 RX ORDER — HYDRALAZINE HYDROCHLORIDE 20 MG/ML
5 INJECTION INTRAMUSCULAR; INTRAVENOUS EVERY 30 MIN PRN
Status: DISCONTINUED | OUTPATIENT
Start: 2023-12-21 | End: 2023-12-21 | Stop reason: HOSPADM

## 2023-12-21 RX ORDER — MIDAZOLAM HYDROCHLORIDE 1 MG/ML
INJECTION INTRAMUSCULAR; INTRAVENOUS AS NEEDED
Status: DISCONTINUED | OUTPATIENT
Start: 2023-12-21 | End: 2023-12-21

## 2023-12-21 RX ORDER — LIDOCAINE HYDROCHLORIDE 10 MG/ML
INJECTION INFILTRATION; PERINEURAL AS NEEDED
Status: DISCONTINUED | OUTPATIENT
Start: 2023-12-21 | End: 2023-12-21 | Stop reason: HOSPADM

## 2023-12-21 RX ORDER — ALBUTEROL SULFATE 0.83 MG/ML
2.5 SOLUTION RESPIRATORY (INHALATION) ONCE AS NEEDED
Status: DISCONTINUED | OUTPATIENT
Start: 2023-12-21 | End: 2023-12-21 | Stop reason: HOSPADM

## 2023-12-21 RX ORDER — SODIUM CHLORIDE 0.9 G/100ML
IRRIGANT IRRIGATION AS NEEDED
Status: DISCONTINUED | OUTPATIENT
Start: 2023-12-21 | End: 2023-12-21 | Stop reason: HOSPADM

## 2023-12-21 RX ORDER — OXYCODONE HYDROCHLORIDE 5 MG/1
5 TABLET ORAL EVERY 4 HOURS PRN
Status: DISCONTINUED | OUTPATIENT
Start: 2023-12-21 | End: 2023-12-21 | Stop reason: HOSPADM

## 2023-12-21 RX ORDER — LIDOCAINE HYDROCHLORIDE 10 MG/ML
0.1 INJECTION, SOLUTION EPIDURAL; INFILTRATION; INTRACAUDAL; PERINEURAL ONCE
Status: DISCONTINUED | OUTPATIENT
Start: 2023-12-21 | End: 2023-12-21 | Stop reason: HOSPADM

## 2023-12-21 RX ORDER — ACETAMINOPHEN 325 MG/1
650 TABLET ORAL EVERY 6 HOURS PRN
Qty: 14 TABLET | Refills: 0 | Status: SHIPPED | OUTPATIENT
Start: 2023-12-21 | End: 2024-01-04

## 2023-12-21 RX ORDER — OXYCODONE HYDROCHLORIDE 5 MG/1
10 TABLET ORAL EVERY 4 HOURS PRN
Status: DISCONTINUED | OUTPATIENT
Start: 2023-12-21 | End: 2023-12-21 | Stop reason: HOSPADM

## 2023-12-21 RX ORDER — PROPOFOL 10 MG/ML
INJECTION, EMULSION INTRAVENOUS CONTINUOUS PRN
Status: DISCONTINUED | OUTPATIENT
Start: 2023-12-21 | End: 2023-12-21

## 2023-12-21 RX ORDER — FENTANYL CITRATE 50 UG/ML
INJECTION, SOLUTION INTRAMUSCULAR; INTRAVENOUS CONTINUOUS PRN
Status: DISCONTINUED | OUTPATIENT
Start: 2023-12-21 | End: 2023-12-21

## 2023-12-21 RX ORDER — ONDANSETRON HYDROCHLORIDE 2 MG/ML
INJECTION, SOLUTION INTRAVENOUS AS NEEDED
Status: DISCONTINUED | OUTPATIENT
Start: 2023-12-21 | End: 2023-12-21

## 2023-12-21 RX ORDER — SODIUM CHLORIDE, SODIUM LACTATE, POTASSIUM CHLORIDE, CALCIUM CHLORIDE 600; 310; 30; 20 MG/100ML; MG/100ML; MG/100ML; MG/100ML
100 INJECTION, SOLUTION INTRAVENOUS CONTINUOUS
Status: DISCONTINUED | OUTPATIENT
Start: 2023-12-21 | End: 2023-12-21

## 2023-12-21 RX ORDER — ACETAMINOPHEN 325 MG/1
975 TABLET ORAL ONCE
Status: DISCONTINUED | OUTPATIENT
Start: 2023-12-21 | End: 2023-12-21 | Stop reason: HOSPADM

## 2023-12-21 RX ADMIN — GLYCOPYRROLATE 0.1 MCG: 0.2 INJECTION, SOLUTION INTRAMUSCULAR; INTRAVITREAL at 09:55

## 2023-12-21 RX ADMIN — SODIUM CHLORIDE, POTASSIUM CHLORIDE, SODIUM LACTATE AND CALCIUM CHLORIDE 100 ML/HR: 600; 310; 30; 20 INJECTION, SOLUTION INTRAVENOUS at 09:05

## 2023-12-21 RX ADMIN — SODIUM CHLORIDE, SODIUM LACTATE, POTASSIUM CHLORIDE, AND CALCIUM CHLORIDE: 600; 310; 30; 20 INJECTION, SOLUTION INTRAVENOUS at 09:54

## 2023-12-21 RX ADMIN — LIDOCAINE HYDROCHLORIDE 100 MG: 20 INJECTION, SOLUTION EPIDURAL; INFILTRATION; INTRACAUDAL; PERINEURAL at 09:59

## 2023-12-21 RX ADMIN — MIDAZOLAM HYDROCHLORIDE 2 MG: 1 INJECTION, SOLUTION INTRAMUSCULAR; INTRAVENOUS at 09:54

## 2023-12-21 RX ADMIN — ONDANSETRON 4 MG: 2 INJECTION INTRAMUSCULAR; INTRAVENOUS at 10:31

## 2023-12-21 RX ADMIN — PROPOFOL 125 MCG/KG/MIN: 10 INJECTION, EMULSION INTRAVENOUS at 09:59

## 2023-12-21 RX ADMIN — CEFAZOLIN 2 G: 1 INJECTION, POWDER, FOR SOLUTION INTRAMUSCULAR; INTRAVENOUS at 10:02

## 2023-12-21 SDOH — HEALTH STABILITY: MENTAL HEALTH: CURRENT SMOKER: 0

## 2023-12-21 ASSESSMENT — COLUMBIA-SUICIDE SEVERITY RATING SCALE - C-SSRS
2. HAVE YOU ACTUALLY HAD ANY THOUGHTS OF KILLING YOURSELF?: NO
6. HAVE YOU EVER DONE ANYTHING, STARTED TO DO ANYTHING, OR PREPARED TO DO ANYTHING TO END YOUR LIFE?: NO
1. IN THE PAST MONTH, HAVE YOU WISHED YOU WERE DEAD OR WISHED YOU COULD GO TO SLEEP AND NOT WAKE UP?: NO

## 2023-12-21 ASSESSMENT — PAIN SCALES - GENERAL
PAINLEVEL_OUTOF10: 0 - NO PAIN
PAIN_LEVEL: 0

## 2023-12-21 ASSESSMENT — PAIN - FUNCTIONAL ASSESSMENT: PAIN_FUNCTIONAL_ASSESSMENT: 0-10

## 2023-12-21 NOTE — ANESTHESIA PREPROCEDURE EVALUATION
Patient: Sarita Morelos    Procedure Information       Date/Time: 12/21/23 1000    Procedures:       Cystoscopy with Injection Therapeutic Agent (Botox)      Removal Continence Control Stimulator Lead    Location: [facility] OR 09 / [facility] OR    Surgeons: Jona Valdez MD            Relevant Problems   Anesthesia (within normal limits)      Cardiovascular   (+) Heart murmur   (+) Hypertension      Endocrine   (+) Goiter with hyperthyroidism   (+) Morbid obesity (CMS/HCC)   (+) Type 2 diabetes mellitus with chronic kidney disease, without long-term current use of insulin, unspecified CKD stage (CMS/HCC)      /Renal   (+) Calculus of kidney   (+) Chronic renal disease, stage III (CMS/HCC)   (+) Recurrent UTI      Neuro/Psych   (+) Carpal tunnel syndrome   (+) Chronic lumbar radiculopathy   (+) Depression   (+) Major depressive disorder with single episode      GI/Hepatic   (+) Symptomatic cholelithiasis      Hematology   (+) Absolute anemia   (+) Iron deficiency anemia      Musculoskeletal   (+) Carpal tunnel syndrome   (+) Lumbar stenosis with neurogenic claudication   (+) Osteoarthritis of knee   (+) Spinal stenosis      Infectious Disease   (+) Recurrent UTI      Other   (+) Arthritis of left knee   (+) Glenohumeral arthritis       Clinical information reviewed:   Tobacco  Allergies  Meds   Med Hx  Surg Hx  OB Status  Fam Hx  Soc   Hx        NPO Detail:  NPO/Void Status  Date of Last Liquid: 12/21/23  Time of Last Liquid: 0700  Date of Last Solid: 12/20/23  Time of Last Solid: 1930         Physical Exam    Airway  Mallampati: I  TM distance: >3 FB     Cardiovascular - normal exam     Dental    Pulmonary - normal exam     Abdominal - normal exam             Anesthesia Plan    ASA 2     MAC     The patient is not a current smoker.    intravenous induction   Postoperative administration of opioids is intended.  Anesthetic plan and risks discussed with patient.  Use of blood products  discussed with patient who.    Plan discussed with CAA.

## 2023-12-21 NOTE — OP NOTE
Cystoscopy with Intra-detrusor Botox injections (100 units), Removal Continence Control Stimulator Lead & IPG Operative Note     Date: 2023  OR Location: [facility] A OR    Name: Sarita Morelos, : 1950, Age: 73 y.o., MRN: [id number], Sex: female    Diagnosis  Pre-op Diagnosis     * Urge incontinence of urine [N39.41] Post-op Diagnosis     * Urge incontinence of urine [N39.41]     Procedures  Cystoscopy with intra-detrusor Botox injections (100 units)  Removal of Interstim lead and IPG with fluoroscopy    Surgeons      * Jona Valdez - Primary    Resident/Fellow/Other Assistant:  Surgeon(s) and Role:  Mar Sapp MD - fellow  Jonnie Lawler MD - resident    Procedure Summary  Anesthesia: Monitor Anesthesia Care  ASA: II  Anesthesia Staff: Anesthesiologist: Cassius Balderas MD  C-AA: KAM Byrd  Estimated Blood Loss: 5mL  Intra-op Medications:   Medication Name Total Dose   sodium chloride 0.9 % irrigation solution 1,000 mL   onabotulinumtoxinA (Botox) injection 100 Units   lidocaine (Xylocaine) 10 mg/mL (1 %) injection 30 mL              Anesthesia Record               Intraprocedure I/O Totals          Intake    Propofol Drip 0.00 mL    The total shown is the total volume documented since Anesthesia Start was filed.    Total Intake 0 mL          Specimen: No specimens collected     Staff:   Circulator: Lalitha Palm RN  Relief Circulator: Quinn Barkley RN  Scrub Person: Day Murray      Findings:   Interstim IPG and lead removed easily from left side  Normal bladder     Procedure Details:   The patient was interviewed in the preop holding area by the surgical team, where the risks, benefits, and indication of the procedure were reviewed.  She was then transferred to the operating suite where the patient was properly identified and the procedure was confirmed. When adequate sedation was obtained, the patient was prepped and draped in a prone position.  A time-out was  performed. Preoperative antibiotics were given.    The previous incision was injected with lidocaine and was re-opened with a scalpel. The IPG was removed from the incision. Fluoroscopy was completed and demonstrated lead in place in the left sacrum. Small incision was made at previous placement site near the lead and the lead was grasped with a hemostat. Using gentle traction, the entire lead was removed intact. Fluoroscopy was repeated and confirmed removal of entire lead.    The incision site was closed by approximating the Naveen's fascia with a 2-0 Vicryl suture and the skin back together with a 4-0 Vicryl suture. Dermabond was then placed over the wounds. The patient was then repositioned to dorsal lithotomy and vaginal prep was completed. The patient was re-draped.    A solution of 100 U of Botox in 10 mL of normal saline was created. The cystoscope was inserted into the bladder. The bladder was filled with approximately 250 mL of normal saline, and the entire 100 U of the Botox solution were injected into the bladder muscle under direct visualization. The bladder was then emptied. The cystoscope was removed.    The patient was then awakened from anesthesia, having tolerated procedure well, and was taken to the recovery room in stable condition. All sponge, needle, and instrument counts were correct at the end the case.    Complications:  None; patient tolerated the procedure well.    Disposition: PACU - hemodynamically stable.  Condition: stable       Dr. Valdez was present for all portions of the procedure.    Mar Sapp MD  Urogynecology and Reconstructive Pelvic Surgery Fellow  12/21/2023 10:52 AM

## 2023-12-21 NOTE — ANESTHESIA POSTPROCEDURE EVALUATION
Patient: Sarita Morelos    Procedure Summary       Date: 12/21/23 Room / Location: [facility] OR 09 / [facility]    Anesthesia Start: 0954 Anesthesia Stop: 1050    Procedures:       Cystoscopy with Injection Therapeutic Agent (Botox)      Removal Continence Control Stimulator Lead & IPG Diagnosis:       Urge incontinence of urine      (Urge incontinence of urine [N39.41])    Surgeons: Jona Valdez MD Responsible Provider: Cassius Balderas MD    Anesthesia Type: MAC ASA Status: 2            Anesthesia Type: MAC    Vitals Value Taken Time   /63 12/21/23 1130   Temp 36.3 °C (97.3 °F) 12/21/23 1043   Pulse 67 12/21/23 1130   Resp 21 12/21/23 1130   SpO2 99 % 12/21/23 1130       Anesthesia Post Evaluation    Patient location during evaluation: bedside  Patient participation: complete - patient cannot participate  Level of consciousness: awake  Pain score: 0  Pain management: adequate  Airway patency: patent  Cardiovascular status: acceptable  Respiratory status: acceptable  Hydration status: acceptable  Postoperative Nausea and Vomiting: none        There were no known notable events for this encounter.

## 2023-12-21 NOTE — DISCHARGE INSTRUCTIONS
Call Dr. Valdez for any problems and/or concerns    *Walk as much as possible  *Okay to shower tomorrow  *You may notice blood in the urine or burning with urination for 1-2 days  *Please take the prescribed antibiotic until it is completed  *Prevent constipation by using stool softeners and staying hydrated, so that you do not strain against your stitches or have pain from constipation    Call Doctor right away for:    *Fever above 100.4/shaking chills  *Bright red vaginal bleeding or bleeding that soaks more than one sanitary pad per hour  *A foul smelling discharge from the vagina  *Trouble urinating or burning with urination  *Severe pain or bloating in your abdomen  *Persistent nausea/vomiting  *Redness, swelling, or drainage at your incision sites  *Chest pain/shortness of breath-call 911.

## 2023-12-22 LAB — BACTERIA UR CULT: ABNORMAL

## 2023-12-23 ENCOUNTER — HOSPITAL ENCOUNTER (OUTPATIENT)
Dept: RADIOLOGY | Facility: HOSPITAL | Age: 73
Discharge: HOME | End: 2023-12-23
Payer: MEDICARE

## 2023-12-23 DIAGNOSIS — K83.8 OTHER SPECIFIED DISEASES OF BILIARY TRACT: ICD-10-CM

## 2023-12-23 PROCEDURE — 74181 MRI ABDOMEN W/O CONTRAST: CPT

## 2023-12-28 DIAGNOSIS — M25.511 ACUTE PAIN OF RIGHT SHOULDER: ICD-10-CM

## 2023-12-28 RX ORDER — DICLOFENAC SODIUM 10 MG/G
4 GEL TOPICAL 4 TIMES DAILY
Qty: 100 G | Refills: 1 | Status: SHIPPED | OUTPATIENT
Start: 2023-12-28

## 2024-01-04 DIAGNOSIS — R73.03 PREDIABETES: ICD-10-CM

## 2024-01-04 DIAGNOSIS — E66.01 MORBID OBESITY (MULTI): ICD-10-CM

## 2024-01-09 ENCOUNTER — OFFICE VISIT (OUTPATIENT)
Dept: ORTHOPEDIC SURGERY | Facility: HOSPITAL | Age: 74
End: 2024-01-09
Payer: MEDICARE

## 2024-01-09 DIAGNOSIS — M19.019 GLENOHUMERAL ARTHRITIS: Primary | ICD-10-CM

## 2024-01-09 DIAGNOSIS — I10 HYPERTENSION, UNSPECIFIED TYPE: ICD-10-CM

## 2024-01-09 DIAGNOSIS — F32.A DEPRESSION, UNSPECIFIED DEPRESSION TYPE: ICD-10-CM

## 2024-01-09 PROCEDURE — 99214 OFFICE O/P EST MOD 30 MIN: CPT | Performed by: ORTHOPAEDIC SURGERY

## 2024-01-09 PROCEDURE — 1159F MED LIST DOCD IN RCRD: CPT | Performed by: ORTHOPAEDIC SURGERY

## 2024-01-09 PROCEDURE — 1126F AMNT PAIN NOTED NONE PRSNT: CPT | Performed by: ORTHOPAEDIC SURGERY

## 2024-01-09 PROCEDURE — 1160F RVW MEDS BY RX/DR IN RCRD: CPT | Performed by: ORTHOPAEDIC SURGERY

## 2024-01-09 PROCEDURE — 1036F TOBACCO NON-USER: CPT | Performed by: ORTHOPAEDIC SURGERY

## 2024-01-09 RX ORDER — AMLODIPINE BESYLATE 10 MG/1
10 TABLET ORAL DAILY
Qty: 90 TABLET | Refills: 1 | Status: SHIPPED | OUTPATIENT
Start: 2024-01-09 | End: 2024-02-28 | Stop reason: SDUPTHER

## 2024-01-09 RX ORDER — DULOXETIN HYDROCHLORIDE 60 MG/1
120 CAPSULE, DELAYED RELEASE ORAL DAILY
Qty: 180 CAPSULE | Refills: 0 | Status: SHIPPED | OUTPATIENT
Start: 2024-01-09 | End: 2024-02-13 | Stop reason: SDUPTHER

## 2024-01-09 ASSESSMENT — PAIN DESCRIPTION - DESCRIPTORS: DESCRIPTORS: ACHING

## 2024-01-09 ASSESSMENT — PAIN - FUNCTIONAL ASSESSMENT: PAIN_FUNCTIONAL_ASSESSMENT: 0-10

## 2024-01-09 ASSESSMENT — PAIN SCALES - GENERAL: PAINLEVEL_OUTOF10: 4

## 2024-01-09 NOTE — PROGRESS NOTES
Subjective    Patient ID: Sarita Morelos is a 73 y.o. female.    Chief Complaint: Pain of the Left Shoulder and Pain of the Right Shoulder     Last Surgery: No surgery found  Last Surgery Date: No surgery found    Sarita is a 73 y.o. right hand dominant female presenting today for evaluation of their right shoulder.     She presents today with complaints of shoulder pain. She was a  for 30 years. She has recently had 2 falls in one day and developed right shoulder pain. The pain got progressively worse so she decided to see her PCP. They ordered x-rays that showed cuff tear arthropathy. She has since had spontaneous resolution of most of her pain. She is here today to establish care.    Past medical history, surgical history, social history, and family history were all reviewed and are as per the [facility] patient health history questionnaire form that I signed and scanned into the chart today.           Objective   Ortho Exam  Patient is a well-developed, well-nourished female in no acute distress.  Breathes with normal chest rises.  Pupils are round and symmetric today.  Awake, alert, and oriented x3.      Examination of the left shoulder today reveals the skin to be intact. There is no sign of any atrophy, lesions, or abrasions. There is no pain to palpation of the bony prominences. Cervical lymphadenopathy examined, and this was negative. Patient had 5 out of 5 wrist flexion, extension, and thumb extension bilaterally. Sensation was intact to light touch to median, ulnar, radial axillary, and musculocutaneous nerves bilaterally. Positive radial pulse bilaterally. Provocative maneuvers on the left side today were negative.  Range of motion of the left shoulder revealed 0-170° of forward elevation, 0-60° of external rotation, and internal rotation was to T-12.     Examination of the right shoulder today reveals the skin to be intact. There is no sign of any atrophy, lesions, or abrasions.  There is no pain to palpation of the bony prominences. Cervical lymphadenopathy examined, and this was negative. Patient had 5 out of 5 wrist flexion, extension, and thumb extension, bilaterally. Sensation was intact to light touch to median, ulnar, radial, axillary, and musculocutaneous nerves bilaterally. Positive radial pulse bilaterally. Provocative maneuvers on the right side today were negative.  Range of motion of the right shoulder revealed 0-170° of forward elevation, 0-60° of external rotation, and internal rotation up to T-12.     Image Results:  MR abdomen wo IV contrast  Narrative: Interpreted By:  Sincere Askew,   STUDY:  MR ABDOMEN WO IV CONTRAST;  12/23/2023 1:17 pm      INDICATION:  Signs/Symptoms:abnormal CT scan.      COMPARISON:  None.      ACCESSION NUMBER(S):  [id number]      ORDERING CLINICIAN:  BRAYDEN CASTRO      TECHNIQUE:  Axial and coronal localizer images were obtained on 11/11/2023. A  bladder stimulator was identified and the exam was terminated..      FINDINGS:  The acquired localizer images demonstrate susceptibility artifact in  the left subcutaneous supragluteal region suggestive of bladder  stimulator. Otherwise images are essentially nondiagnostic.      Impression: Exam terminated after localizer images were obtained due to presence  of an undocumented bladder stimulator.      MACRO:  None      Signed by: Sincere Askew 12/27/2023 10:28 AM  Dictation workstation:   [id number]  01/09/24 X-rays personally ordered and interpreted by me show end stage arthritis of the right shoulder        Assessment/Plan   Encounter Diagnoses:  No diagnosis found.  Patient with massive right rotator cuff tear with underlying arthritis    At this time, we had a very long discussion with the patient regarding the diagnosis. Rotator cuff disease is a spectrum of disorders ranging from rotator cuff tendinitis to full-thickness rotator cuff tears. We know that some patients over the age of 50 will  have symptomatic rotator cuff tears just due to overuse and general wear and tear. In general, most patients who come in with complaints such as these will get better with therapy and injections alone. The therapy should be performed 2-3 times a day and should take about 10-15 minutes to perform each time.      We had a discussion regarding her diagnosis. We talked about her treatment options. As she is feeling good right now, her next step would be an injection when her shoulder pain begins to be intolerable. We provided her with at home therapy to do daily to help with her symptoms. She will follow up as needed.     No orders of the defined types were placed in this encounter.    Follow up as needed    Scribe Attestation  By signing my name below, ICharlee Scribe   attest that this documentation has been prepared under the direction and in the presence of Juan Pendleton MD.

## 2024-01-12 ENCOUNTER — HOSPITAL ENCOUNTER (OUTPATIENT)
Dept: RADIOLOGY | Facility: HOSPITAL | Age: 74
Discharge: HOME | End: 2024-01-12
Payer: MEDICARE

## 2024-01-12 DIAGNOSIS — K83.8 DILATION OF BILIARY TRACT: Primary | ICD-10-CM

## 2024-01-12 DIAGNOSIS — K83.8 DILATED CBD, ACQUIRED: ICD-10-CM

## 2024-01-12 PROCEDURE — 2550000001 HC RX 255 CONTRASTS

## 2024-01-12 PROCEDURE — 74183 MRI ABD W/O CNTR FLWD CNTR: CPT | Mod: REDUCED SERVICES | Performed by: RADIOLOGY

## 2024-01-12 PROCEDURE — 74185 MRA ABD W OR W/O CNTRST: CPT | Mod: 52

## 2024-01-12 PROCEDURE — 76376 3D RENDER W/INTRP POSTPROCES: CPT | Mod: REDUCED SERVICES | Performed by: RADIOLOGY

## 2024-01-12 PROCEDURE — A9575 INJ GADOTERATE MEGLUMI 0.1ML: HCPCS

## 2024-01-12 RX ORDER — GADOTERATE MEGLUMINE 376.9 MG/ML
20 INJECTION INTRAVENOUS
Status: COMPLETED | OUTPATIENT
Start: 2024-01-12 | End: 2024-01-12

## 2024-01-12 RX ADMIN — GADOTERATE MEGLUMINE 20 ML: 376.9 INJECTION INTRAVENOUS at 10:57

## 2024-01-19 ENCOUNTER — TELEMEDICINE (OUTPATIENT)
Dept: SURGERY | Facility: CLINIC | Age: 74
End: 2024-01-19
Payer: MEDICARE

## 2024-01-19 DIAGNOSIS — Z98.84 GASTRIC BYPASS STATUS FOR OBESITY: Primary | ICD-10-CM

## 2024-01-19 DIAGNOSIS — K83.8 DILATION OF BILIARY TRACT: ICD-10-CM

## 2024-01-19 PROCEDURE — 99214 OFFICE O/P EST MOD 30 MIN: CPT | Performed by: SURGERY

## 2024-01-19 NOTE — PROGRESS NOTES
Subjective   Patient ID: Sarita Morelos is a 73 y.o. female who presents for follow-up of a recent MRI of her liver and bile ducts.  She is well-known to me from 2 years ago she had a small bowel obstruction that was treated by one of my partners with a diagnostic laparoscopy, lysis of adhesions, and gastrostomy tube placement into her remnant stomach.  She is also status post Kit-en-Y gastric bypass several years ago.  Her postoperative course was complicated by G-tube dislodgment.  I took her to the operating room for G-tube replacement, and repair of a small bowel enterotomy.  She was noted to have fairly dense adhesions at both of her procedures.  She now presents for follow-up of a recent MRCP scan due to dilation of her common bile duct.  She is noted to have common bile duct dilation up to 1.4 cm.  She is status post cholecystectomy as well as Kit-en-Y gastric bypass.  She has normal LFTs, and denies any right upper quadrant abdominal pain.  She does get back pains, but she does have chronic kidney stones as well.  She also occasionally has some left upper quadrant abdominal pains, which have been present since the gastric bypass.    Review of Systems    Objective   Physical Exam    Assessment/Plan   The patient is a 73-year-old woman with an extensive past surgical history including prior Kit-en-Y gastric bypass followed by diagnostic laparoscopy, lysis of adhesions, and remnant stomach gastrostomy tube placement for small bowel obstruction 2 years ago.  I operate on her 2 weeks after this for gastrostomy tube dislodgment.  At both of those procedures she was noted to have very dense scar tissue throughout the abdomen.  She presented today for evaluation of common bile duct dilation.  She is status post cholecystectomy.  The MRCP shows dilation of the common bile duct up to 1.4 cm with distal tapering.  There are no stones within the common bile duct, and no obvious mass within the  pancreas.  She has normal LFTs.   She is also fairly asymptomatic from this biliary dilation.  I will make a referral to Dr. Patel of GI to evaluate her for this biliary dilation noted on MRCP.  She may benefit from an EDGE procedure with ERCP rather than laparoscopic assisted ERCP given the amount of scar tissue she has and prior surgical history.  The patient voiced understanding, and wishes to follow-up with GI to further discuss the biliary dilation.           Cabrera Rivero MD 01/19/24 10:24 AM

## 2024-02-13 ENCOUNTER — OFFICE VISIT (OUTPATIENT)
Dept: BEHAVIORAL HEALTH | Facility: CLINIC | Age: 74
End: 2024-02-13
Payer: MEDICARE

## 2024-02-13 DIAGNOSIS — F32.A DEPRESSION, UNSPECIFIED DEPRESSION TYPE: ICD-10-CM

## 2024-02-13 PROCEDURE — 99213 OFFICE O/P EST LOW 20 MIN: CPT | Performed by: PSYCHIATRY & NEUROLOGY

## 2024-02-13 PROCEDURE — 1126F AMNT PAIN NOTED NONE PRSNT: CPT | Performed by: PSYCHIATRY & NEUROLOGY

## 2024-02-13 PROCEDURE — 1160F RVW MEDS BY RX/DR IN RCRD: CPT | Performed by: PSYCHIATRY & NEUROLOGY

## 2024-02-13 PROCEDURE — 99213 OFFICE O/P EST LOW 20 MIN: CPT | Mod: AM | Performed by: PSYCHIATRY & NEUROLOGY

## 2024-02-13 PROCEDURE — 1036F TOBACCO NON-USER: CPT | Performed by: PSYCHIATRY & NEUROLOGY

## 2024-02-13 PROCEDURE — 1159F MED LIST DOCD IN RCRD: CPT | Performed by: PSYCHIATRY & NEUROLOGY

## 2024-02-13 RX ORDER — DULOXETIN HYDROCHLORIDE 60 MG/1
120 CAPSULE, DELAYED RELEASE ORAL DAILY
Qty: 180 CAPSULE | Refills: 1 | Status: SHIPPED | OUTPATIENT
Start: 2024-02-13 | End: 2024-08-11

## 2024-02-13 SDOH — ECONOMIC STABILITY: HOUSING INSECURITY: IN THE LAST 12 MONTHS, HOW MANY PLACES HAVE YOU LIVED?: 1

## 2024-02-13 SDOH — ECONOMIC STABILITY: HOUSING INSECURITY
IN THE LAST 12 MONTHS, WAS THERE A TIME WHEN YOU DID NOT HAVE A STEADY PLACE TO SLEEP OR SLEPT IN A SHELTER (INCLUDING NOW)?: NO

## 2024-02-13 SDOH — ECONOMIC STABILITY: FOOD INSECURITY: WITHIN THE PAST 12 MONTHS, THE FOOD YOU BOUGHT JUST DIDN'T LAST AND YOU DIDN'T HAVE MONEY TO GET MORE.: NEVER TRUE

## 2024-02-13 SDOH — ECONOMIC STABILITY: FOOD INSECURITY: WITHIN THE PAST 12 MONTHS, YOU WORRIED THAT YOUR FOOD WOULD RUN OUT BEFORE YOU GOT MONEY TO BUY MORE.: NEVER TRUE

## 2024-02-13 SDOH — ECONOMIC STABILITY: TRANSPORTATION INSECURITY
IN THE PAST 12 MONTHS, HAS THE LACK OF TRANSPORTATION KEPT YOU FROM MEDICAL APPOINTMENTS OR FROM GETTING MEDICATIONS?: NO

## 2024-02-13 SDOH — ECONOMIC STABILITY: INCOME INSECURITY: IN THE LAST 12 MONTHS, WAS THERE A TIME WHEN YOU WERE NOT ABLE TO PAY THE MORTGAGE OR RENT ON TIME?: NO

## 2024-02-13 SDOH — ECONOMIC STABILITY: GENERAL
WHICH OF THE FOLLOWING WOULD YOU LIKE TO GET CONNECTED TO IN ORDER TO RECEIVE A DISCOUNT OR FOR FREE? (CHOOSE ALL THAT APPLY): NONE OF THESE

## 2024-02-13 SDOH — HEALTH STABILITY: PHYSICAL HEALTH: ON AVERAGE, HOW MANY MINUTES DO YOU ENGAGE IN EXERCISE AT THIS LEVEL?: 10 MIN

## 2024-02-13 SDOH — ECONOMIC STABILITY: TRANSPORTATION INSECURITY
IN THE PAST 12 MONTHS, HAS LACK OF TRANSPORTATION KEPT YOU FROM MEETINGS, WORK, OR FROM GETTING THINGS NEEDED FOR DAILY LIVING?: NO

## 2024-02-13 SDOH — HEALTH STABILITY: PHYSICAL HEALTH: ON AVERAGE, HOW MANY DAYS PER WEEK DO YOU ENGAGE IN MODERATE TO STRENUOUS EXERCISE (LIKE A BRISK WALK)?: 7 DAYS

## 2024-02-13 SDOH — ECONOMIC STABILITY: GENERAL
WHICH OF THE FOLLOWING DO YOU KNOW HOW TO USE AND HAVE ACCESS TO EVERY DAY? (CHOOSE ALL THAT APPLY): DESKTOP COMPUTER, LAPTOP COMPUTER, OR TABLET WITH BROADBAND INTERNET CONNECTION;SMARTPHONE WITH CELLULAR DATA PLAN

## 2024-02-13 ASSESSMENT — LIFESTYLE VARIABLES
HOW OFTEN DO YOU HAVE SIX OR MORE DRINKS ON ONE OCCASION: NEVER
AUDIT-C TOTAL SCORE: 0
SKIP TO QUESTIONS 9-10: 1
HOW OFTEN DO YOU HAVE A DRINK CONTAINING ALCOHOL: NEVER
HOW MANY STANDARD DRINKS CONTAINING ALCOHOL DO YOU HAVE ON A TYPICAL DAY: PATIENT DOES NOT DRINK

## 2024-02-13 ASSESSMENT — SOCIAL DETERMINANTS OF HEALTH (SDOH)
WITHIN THE LAST YEAR, HAVE YOU BEEN HUMILIATED OR EMOTIONALLY ABUSED IN OTHER WAYS BY YOUR PARTNER OR EX-PARTNER?: NO
DO YOU BELONG TO ANY CLUBS OR ORGANIZATIONS SUCH AS CHURCH GROUPS UNIONS, FRATERNAL OR ATHLETIC GROUPS, OR SCHOOL GROUPS?: YES
IN A TYPICAL WEEK, HOW MANY TIMES DO YOU TALK ON THE PHONE WITH FAMILY, FRIENDS, OR NEIGHBORS?: MORE THAN THREE TIMES A WEEK
HOW OFTEN DO YOU ATTEND CHURCH OR RELIGIOUS SERVICES?: MORE THAN 4 TIMES PER YEAR
HOW OFTEN DO YOU ATTENT MEETINGS OF THE CLUB OR ORGANIZATION YOU BELONG TO?: 1 TO 4 TIMES PER YEAR
WITHIN THE LAST YEAR, HAVE YOU BEEN AFRAID OF YOUR PARTNER OR EX-PARTNER?: NO
HOW OFTEN DO YOU GET TOGETHER WITH FRIENDS OR RELATIVES?: MORE THAN THREE TIMES A WEEK
IN THE PAST 12 MONTHS, HAS THE ELECTRIC, GAS, OIL, OR WATER COMPANY THREATENED TO SHUT OFF SERVICE IN YOUR HOME?: NO
WITHIN THE LAST YEAR, HAVE YOU BEEN KICKED, HIT, SLAPPED, OR OTHERWISE PHYSICALLY HURT BY YOUR PARTNER OR EX-PARTNER?: NO
HOW HARD IS IT FOR YOU TO PAY FOR THE VERY BASICS LIKE FOOD, HOUSING, MEDICAL CARE, AND HEATING?: NOT HARD AT ALL
WITHIN THE LAST YEAR, HAVE TO BEEN RAPED OR FORCED TO HAVE ANY KIND OF SEXUAL ACTIVITY BY YOUR PARTNER OR EX-PARTNER?: NO

## 2024-02-13 NOTE — PROGRESS NOTES
Subjective   Patient ID: Leslie Morelos is a 73 y.o. female who presents for No chief complaint on file. I am doing pretty good.     The patient is alert, fully oriented, language is intact, and recent and remote memory intact. The patient denies any suicidal or homicidal ideation or plans. The patient presents with no depressive, manic or psychotic symptoms. Thought is logical and clear. No disturbances of judgment or insight are exhibited. No behavioral disturbances are present on examination.        Review of Systems   Neurological:         The patient is alert, fully oriented, language is intact, and recent and remote memory intact. The patient denies any suicidal or homicidal ideation or plans. The patient presents with no depressive, manic or psychotic symptoms. Thought is logical and clear. No disturbances of judgment or insight are exhibited. No behavioral disturbances are present on examination.       Psychiatric/Behavioral:          The patient is alert, fully oriented, language is intact, and recent and remote memory intact. The patient denies any suicidal or homicidal ideation or plans. The patient presents with no depressive, manic or psychotic symptoms. Thought is logical and clear. No disturbances of judgment or insight are exhibited. No behavioral disturbances are present on examination.       All other systems reviewed and are negative.      Objective   Physical Exam  Constitutional:       Appearance: Normal appearance.   Neurological:      General: No focal deficit present.      Mental Status: She is alert and oriented to person, place, and time. Mental status is at baseline.      Comments: The patient is alert, fully oriented, language is intact, and recent and remote memory intact. The patient denies any suicidal or homicidal ideation or plans. The patient presents with no depressive, manic or psychotic symptoms. Thought is logical and clear. No disturbances of judgment or insight are  exhibited. No behavioral disturbances are present on examination.       Psychiatric:         Mood and Affect: Mood normal.         Behavior: Behavior normal.         Thought Content: Thought content normal.         Judgment: Judgment normal.      Comments: The patient is alert, fully oriented, language is intact, and recent and remote memory intact. The patient denies any suicidal or homicidal ideation or plans. The patient presents with no depressive, manic or psychotic symptoms. Thought is logical and clear. No disturbances of judgment or insight are exhibited. No behavioral disturbances are present on examination.             Lab Review:       Assessment/Plan   The FDA risks, benefits & alternatives to the medications prescribed were explained to you today. You were able to understand & repeat these risks, benefits & alternatives to these prescribed medications. You have agreed to proceed with treatment with the medications discussed based on the conclusion that the benefit outweighs the risks of this treatment regimen: continue duloxetine 120 mg daily. Follow up in July of 2024. We also made a referral to psychology today for you to see a clinical psychologist.

## 2024-02-14 ENCOUNTER — APPOINTMENT (OUTPATIENT)
Dept: GASTROENTEROLOGY | Facility: CLINIC | Age: 74
End: 2024-02-14
Payer: COMMERCIAL

## 2024-02-15 DIAGNOSIS — I10 PRIMARY HYPERTENSION: ICD-10-CM

## 2024-02-15 RX ORDER — METOPROLOL TARTRATE 75 MG/1
75 TABLET, FILM COATED ORAL DAILY
Qty: 90 TABLET | Refills: 0 | Status: SHIPPED | OUTPATIENT
Start: 2024-02-15 | End: 2024-02-28 | Stop reason: SDUPTHER

## 2024-02-28 ENCOUNTER — OFFICE VISIT (OUTPATIENT)
Dept: PRIMARY CARE | Facility: CLINIC | Age: 74
End: 2024-02-28
Payer: MEDICARE

## 2024-02-28 VITALS — SYSTOLIC BLOOD PRESSURE: 120 MMHG | WEIGHT: 240 LBS | DIASTOLIC BLOOD PRESSURE: 78 MMHG | BODY MASS INDEX: 43.9 KG/M2

## 2024-02-28 DIAGNOSIS — E11.22 TYPE 2 DIABETES MELLITUS WITH CHRONIC KIDNEY DISEASE, WITHOUT LONG-TERM CURRENT USE OF INSULIN, UNSPECIFIED CKD STAGE (MULTI): Primary | ICD-10-CM

## 2024-02-28 DIAGNOSIS — R73.03 PREDIABETES: ICD-10-CM

## 2024-02-28 DIAGNOSIS — E78.5 DYSLIPIDEMIA: ICD-10-CM

## 2024-02-28 DIAGNOSIS — I10 PRIMARY HYPERTENSION: ICD-10-CM

## 2024-02-28 DIAGNOSIS — I10 HYPERTENSION, UNSPECIFIED TYPE: ICD-10-CM

## 2024-02-28 DIAGNOSIS — E66.01 MORBID OBESITY (MULTI): ICD-10-CM

## 2024-02-28 DIAGNOSIS — M17.0 OSTEOARTHRITIS OF BOTH KNEES, UNSPECIFIED OSTEOARTHRITIS TYPE: ICD-10-CM

## 2024-02-28 PROCEDURE — 1159F MED LIST DOCD IN RCRD: CPT | Performed by: INTERNAL MEDICINE

## 2024-02-28 PROCEDURE — 1160F RVW MEDS BY RX/DR IN RCRD: CPT | Performed by: INTERNAL MEDICINE

## 2024-02-28 PROCEDURE — 3074F SYST BP LT 130 MM HG: CPT | Performed by: INTERNAL MEDICINE

## 2024-02-28 PROCEDURE — 1126F AMNT PAIN NOTED NONE PRSNT: CPT | Performed by: INTERNAL MEDICINE

## 2024-02-28 PROCEDURE — 3078F DIAST BP <80 MM HG: CPT | Performed by: INTERNAL MEDICINE

## 2024-02-28 PROCEDURE — 1036F TOBACCO NON-USER: CPT | Performed by: INTERNAL MEDICINE

## 2024-02-28 PROCEDURE — 99214 OFFICE O/P EST MOD 30 MIN: CPT | Performed by: INTERNAL MEDICINE

## 2024-02-28 RX ORDER — ATORVASTATIN CALCIUM 40 MG/1
40 TABLET, FILM COATED ORAL NIGHTLY
Qty: 90 TABLET | Refills: 3 | Status: SHIPPED | OUTPATIENT
Start: 2024-02-28 | End: 2024-02-28 | Stop reason: SDUPTHER

## 2024-02-28 RX ORDER — AMLODIPINE BESYLATE 10 MG/1
10 TABLET ORAL DAILY
Qty: 90 TABLET | Refills: 3 | Status: SHIPPED | OUTPATIENT
Start: 2024-02-28 | End: 2024-03-27 | Stop reason: SDUPTHER

## 2024-02-28 RX ORDER — METOPROLOL TARTRATE 75 MG/1
75 TABLET, FILM COATED ORAL DAILY
Qty: 90 TABLET | Refills: 2 | Status: SHIPPED | OUTPATIENT
Start: 2024-02-28 | End: 2024-02-28 | Stop reason: SDUPTHER

## 2024-02-28 RX ORDER — ATORVASTATIN CALCIUM 40 MG/1
40 TABLET, FILM COATED ORAL NIGHTLY
Qty: 90 TABLET | Refills: 3 | Status: SHIPPED | OUTPATIENT
Start: 2024-02-28 | End: 2024-03-27 | Stop reason: SDUPTHER

## 2024-02-28 RX ORDER — AMLODIPINE BESYLATE 10 MG/1
10 TABLET ORAL DAILY
Qty: 90 TABLET | Refills: 1 | Status: SHIPPED | OUTPATIENT
Start: 2024-02-28 | End: 2024-02-28 | Stop reason: SDUPTHER

## 2024-02-28 RX ORDER — METOPROLOL TARTRATE 75 MG/1
75 TABLET, FILM COATED ORAL DAILY
Qty: 90 TABLET | Refills: 2 | Status: SHIPPED | OUTPATIENT
Start: 2024-02-28 | End: 2024-03-27 | Stop reason: SDUPTHER

## 2024-02-28 RX ORDER — MELOXICAM 15 MG/1
15 TABLET ORAL DAILY
Qty: 30 TABLET | Refills: 2 | Status: SHIPPED | OUTPATIENT
Start: 2024-02-28 | End: 2024-03-27 | Stop reason: SDUPTHER

## 2024-02-28 RX ORDER — AMLODIPINE BESYLATE 10 MG/1
10 TABLET ORAL DAILY
Qty: 90 TABLET | Refills: 3 | Status: SHIPPED | OUTPATIENT
Start: 2024-02-28 | End: 2024-02-28 | Stop reason: SDUPTHER

## 2024-02-28 RX ORDER — MELOXICAM 15 MG/1
15 TABLET ORAL DAILY
Qty: 30 TABLET | Refills: 2 | Status: SHIPPED | OUTPATIENT
Start: 2024-02-28 | End: 2024-02-28 | Stop reason: SDUPTHER

## 2024-02-28 ASSESSMENT — PATIENT HEALTH QUESTIONNAIRE - PHQ9
SUM OF ALL RESPONSES TO PHQ9 QUESTIONS 1 AND 2: 0
2. FEELING DOWN, DEPRESSED OR HOPELESS: NOT AT ALL
1. LITTLE INTEREST OR PLEASURE IN DOING THINGS: NOT AT ALL

## 2024-02-28 NOTE — PROGRESS NOTES
Subjective   Sarita Morelos is a 73 y.o. female with a hx of HTN, dyslipidemia, obesity, gastric bypass surgery, cholecystectomy who presents for follow up.    Recently saw Dr. Rivero for dilated CBD of 1.4cm found on MRCP. At the time, no further intervention was recommended and she was referred to Dr. Patel (GI) for further evaluation.     Denies associated n/v, near syncope or syncope, chest pain, SOB, abdominal pain, changes to BM.     Objective   There were no vitals taken for this visit.    Physical Exam  Constitutional: Appears stated age. In NAD.   HEENT: NC/AT, EOMI, clear sclera, moist mucous membranes  CV: RRR, No M/R/G  PULM: CTAB, no coughing or wheezing  ABDOMEN: Soft, NT/ND. No TTP. + BSx4.  SKIN: Normal Color, Warm, Dry, No Rashes   EXTREMITIES: Non-Tender, Full ROM, No Pedal Edema  NEURO: A&O x 4, nonfocal neurological exam.  PSYCH: Normal Mood & Behavior    Assessment/Plan   Problem List Items Addressed This Visit       Dyslipidemia    Relevant Medications    atorvastatin (Lipitor) 40 mg tablet    Hypertension    Relevant Medications    amLODIPine (Norvasc) 10 mg tablet    metoprolol tartrate (Lopressor) 75 mg tablet    Morbid obesity (CMS/HCC)    Relevant Medications    semaglutide (OZEMPIC) 1 mg/dose (4 mg/3 mL) pen injector    Osteoarthritis of knee    Relevant Medications    meloxicam (Mobic) 15 mg tablet    Prediabetes    Relevant Medications    semaglutide (OZEMPIC) 1 mg/dose (4 mg/3 mL) pen injector    Type 2 diabetes mellitus with chronic kidney disease, without long-term current use of insulin, unspecified CKD stage (CMS/HCC) - Primary    Relevant Medications    semaglutide (OZEMPIC) 1 mg/dose (4 mg/3 mL) pen injector     Sarita Morelos is a 73 y.o. female with a hx of HTN, dyslipidemia, obesity, gastric bypass surgery, cholecystectomy who presents for follow up.      #Hx of cholecystectomy  #1.4cm CBD dilation on MRCP  - Asymptomatic, normal LFTs  - Will  follow up with Dr. Patel in 3/2024 for biliary dilation    #Right shoulder pain likely 2/2 chronic rotator cuff tear and glenohumeral OA per R shoulder XRAY   - Diclofenac gel PRN  - Patient has been doing physical therapy on her own  - Saw orthopedic surgeon who recommended continuing physical therapy and injections if pain is intolerable     #Morbid obesity  #Hx of gastric bypass surgery  - Increased Ozempic to 1mg weekly     #HTN  - Well controlled at home. 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

## 2024-03-05 ENCOUNTER — OFFICE VISIT (OUTPATIENT)
Dept: GASTROENTEROLOGY | Facility: CLINIC | Age: 74
End: 2024-03-05
Payer: MEDICARE

## 2024-03-05 VITALS
HEIGHT: 62 IN | HEART RATE: 88 BPM | TEMPERATURE: 97.5 F | DIASTOLIC BLOOD PRESSURE: 85 MMHG | SYSTOLIC BLOOD PRESSURE: 169 MMHG | WEIGHT: 234 LBS | BODY MASS INDEX: 43.06 KG/M2

## 2024-03-05 DIAGNOSIS — K83.8 DILATION OF BILIARY TRACT: ICD-10-CM

## 2024-03-05 DIAGNOSIS — Z98.84 GASTRIC BYPASS STATUS FOR OBESITY: Primary | ICD-10-CM

## 2024-03-05 PROCEDURE — 99212 OFFICE O/P EST SF 10 MIN: CPT | Performed by: INTERNAL MEDICINE

## 2024-03-05 PROCEDURE — 3079F DIAST BP 80-89 MM HG: CPT | Performed by: INTERNAL MEDICINE

## 2024-03-05 PROCEDURE — 1126F AMNT PAIN NOTED NONE PRSNT: CPT | Performed by: INTERNAL MEDICINE

## 2024-03-05 PROCEDURE — 1159F MED LIST DOCD IN RCRD: CPT | Performed by: INTERNAL MEDICINE

## 2024-03-05 PROCEDURE — 1036F TOBACCO NON-USER: CPT | Performed by: INTERNAL MEDICINE

## 2024-03-05 PROCEDURE — 1160F RVW MEDS BY RX/DR IN RCRD: CPT | Performed by: INTERNAL MEDICINE

## 2024-03-05 PROCEDURE — 3077F SYST BP >= 140 MM HG: CPT | Performed by: INTERNAL MEDICINE

## 2024-03-05 ASSESSMENT — ENCOUNTER SYMPTOMS: CONSTITUTIONAL NEGATIVE: 1

## 2024-03-05 NOTE — PROGRESS NOTES
"Subjective   Patient ID: Sarita Morelos \"Leslie\" is a 74 y.o. female who presents for New Patient Visit.  Referred by Dr. Rivero for a dilated bile duct    She has a complex surgical history well documented by Dr. Rivero:   2 years ago she had a small bowel obstruction that was treated by one of my partners with a diagnostic laparoscopy, lysis of adhesions, and gastrostomy tube placement into her remnant stomach.  She is also status post Kit-en-Y gastric bypass several years ago.  Her postoperative course was complicated by G-tube dislodgment.  I took her to the operating room for G-tube replacement, and repair of a small bowel enterotomy.  She was noted to have fairly dense adhesions at both of her procedures.  She now presents for follow-up of a recent MRCP scan due to dilation of her common bile duct.  She is noted to have common bile duct dilation up to 1.4 cm.  She is status post cholecystectomy as well as Kit-en-Y gastric bypass.  She has normal LFTs, and denies any right upper quadrant abdominal pain.  She does get back pains, but she does have chronic kidney stones as well.  She also occasionally has some left upper quadrant abdominal pains, which have been present since the gastric bypass.    She has no symptoms on the right side  LFTs normal  Her repeat MRI after removing stimulator is pristine for a normal appearing PBJ     Discussed EDGE procedure         Review of Systems   Constitutional: Negative.        Objective   Physical Exam  Constitutional:       Appearance: She is obese.   Pulmonary:      Effort: Pulmonary effort is normal.   Neurological:      Mental Status: She is alert.   Psychiatric:         Mood and Affect: Mood normal.         Behavior: Behavior normal.         Judgment: Judgment normal.         Assessment/Plan   Problem List Items Addressed This Visit             ICD-10-CM    Gastric bypass status for obesity - Primary Z98.84    Dilation of biliary tract K83.8        "     Miguel Patel DO 03/05/24 11:45 AM

## 2024-03-05 NOTE — PATIENT INSTRUCTIONS
We reviewed the options of the procedure called EGDE assisted ERCP and right now the risks are more than the benefits.  I don't see any concerns for an occult neoplasm.      Watch out for pain, fever or anything that seems like a gallbladder attack in the future.

## 2024-03-06 DIAGNOSIS — E66.01 MORBID OBESITY (MULTI): ICD-10-CM

## 2024-03-06 DIAGNOSIS — E11.22 TYPE 2 DIABETES MELLITUS WITH CHRONIC KIDNEY DISEASE, WITHOUT LONG-TERM CURRENT USE OF INSULIN, UNSPECIFIED CKD STAGE (MULTI): ICD-10-CM

## 2024-03-06 DIAGNOSIS — R73.03 PREDIABETES: ICD-10-CM

## 2024-03-07 ENCOUNTER — APPOINTMENT (OUTPATIENT)
Dept: BEHAVIORAL HEALTH | Facility: CLINIC | Age: 74
End: 2024-03-07
Payer: MEDICARE

## 2024-03-15 ENCOUNTER — APPOINTMENT (OUTPATIENT)
Dept: BEHAVIORAL HEALTH | Facility: CLINIC | Age: 74
End: 2024-03-15
Payer: COMMERCIAL

## 2024-03-20 DIAGNOSIS — M54.16 CHRONIC LUMBAR RADICULOPATHY: ICD-10-CM

## 2024-03-27 ENCOUNTER — APPOINTMENT (OUTPATIENT)
Dept: BEHAVIORAL HEALTH | Facility: CLINIC | Age: 74
End: 2024-03-27
Payer: COMMERCIAL

## 2024-03-27 DIAGNOSIS — I10 PRIMARY HYPERTENSION: ICD-10-CM

## 2024-03-27 DIAGNOSIS — M17.0 OSTEOARTHRITIS OF BOTH KNEES, UNSPECIFIED OSTEOARTHRITIS TYPE: ICD-10-CM

## 2024-03-27 DIAGNOSIS — E78.5 DYSLIPIDEMIA: ICD-10-CM

## 2024-03-27 DIAGNOSIS — E11.22 TYPE 2 DIABETES MELLITUS WITH CHRONIC KIDNEY DISEASE, WITHOUT LONG-TERM CURRENT USE OF INSULIN, UNSPECIFIED CKD STAGE (MULTI): ICD-10-CM

## 2024-03-27 DIAGNOSIS — E66.01 MORBID OBESITY (MULTI): ICD-10-CM

## 2024-03-27 DIAGNOSIS — I10 HYPERTENSION, UNSPECIFIED TYPE: ICD-10-CM

## 2024-03-27 RX ORDER — AMLODIPINE BESYLATE 10 MG/1
10 TABLET ORAL DAILY
Qty: 90 TABLET | Refills: 0 | Status: SHIPPED | OUTPATIENT
Start: 2024-03-27

## 2024-03-27 RX ORDER — METOPROLOL TARTRATE 75 MG/1
75 TABLET, FILM COATED ORAL DAILY
Qty: 90 TABLET | Refills: 0 | Status: SHIPPED | OUTPATIENT
Start: 2024-03-27 | End: 2024-05-22 | Stop reason: SDUPTHER

## 2024-03-27 RX ORDER — MELOXICAM 15 MG/1
15 TABLET ORAL DAILY
Qty: 30 TABLET | Refills: 2 | Status: SHIPPED | OUTPATIENT
Start: 2024-03-27 | End: 2025-03-27

## 2024-03-27 RX ORDER — ATORVASTATIN CALCIUM 40 MG/1
40 TABLET, FILM COATED ORAL NIGHTLY
Qty: 90 TABLET | Refills: 0 | Status: SHIPPED | OUTPATIENT
Start: 2024-03-27

## 2024-04-01 ENCOUNTER — SOCIAL WORK (OUTPATIENT)
Dept: BEHAVIORAL HEALTH | Facility: CLINIC | Age: 74
End: 2024-04-01
Payer: MEDICARE

## 2024-04-01 DIAGNOSIS — F33.1 MDD (MAJOR DEPRESSIVE DISORDER), RECURRENT EPISODE, MODERATE (MULTI): Primary | ICD-10-CM

## 2024-04-01 PROCEDURE — 90791 PSYCH DIAGNOSTIC EVALUATION: CPT
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
## 2024-05-05 PROBLEM — F33.1 MDD (MAJOR DEPRESSIVE DISORDER), RECURRENT EPISODE, MODERATE (MULTI): Status: ACTIVE | Noted: 2024-05-05

## 2024-05-21 ENCOUNTER — OFFICE VISIT (OUTPATIENT)
Dept: UROLOGY | Facility: CLINIC | Age: 74
End: 2024-05-21
Payer: MEDICARE

## 2024-05-21 VITALS
DIASTOLIC BLOOD PRESSURE: 69 MMHG | WEIGHT: 228.5 LBS | HEART RATE: 84 BPM | BODY MASS INDEX: 41.79 KG/M2 | SYSTOLIC BLOOD PRESSURE: 145 MMHG

## 2024-05-21 DIAGNOSIS — N39.0 RECURRENT UTI: ICD-10-CM

## 2024-05-21 DIAGNOSIS — N39.41 URGE INCONTINENCE OF URINE: Primary | ICD-10-CM

## 2024-05-21 DIAGNOSIS — N39.0 ACUTE UTI: ICD-10-CM

## 2024-05-21 DIAGNOSIS — N81.89 PELVIC FLOOR WEAKNESS: ICD-10-CM

## 2024-05-21 DIAGNOSIS — N39.0 CHRONIC UTI: ICD-10-CM

## 2024-05-21 DIAGNOSIS — N95.2 VAGINAL ATROPHY: ICD-10-CM

## 2024-05-21 LAB
POC APPEARANCE, URINE: ABNORMAL
POC BILIRUBIN, URINE: NEGATIVE
POC BLOOD, URINE: ABNORMAL
POC COLOR, URINE: YELLOW
POC GLUCOSE, URINE: NEGATIVE MG/DL
POC KETONES, URINE: NEGATIVE MG/DL
POC LEUKOCYTES, URINE: ABNORMAL
POC NITRITE,URINE: POSITIVE
POC PH, URINE: 5.5 PH
POC PROTEIN, URINE: ABNORMAL MG/DL
POC SPECIFIC GRAVITY, URINE: >=1.03
POC UROBILINOGEN, URINE: 0.2 EU/DL

## 2024-05-21 PROCEDURE — 81003 URINALYSIS AUTO W/O SCOPE: CPT | Performed by: OBSTETRICS & GYNECOLOGY

## 2024-05-21 PROCEDURE — 1160F RVW MEDS BY RX/DR IN RCRD: CPT | Performed by: OBSTETRICS & GYNECOLOGY

## 2024-05-21 PROCEDURE — 1159F MED LIST DOCD IN RCRD: CPT | Performed by: OBSTETRICS & GYNECOLOGY

## 2024-05-21 PROCEDURE — 99214 OFFICE O/P EST MOD 30 MIN: CPT | Performed by: OBSTETRICS & GYNECOLOGY

## 2024-05-21 PROCEDURE — 1036F TOBACCO NON-USER: CPT | Performed by: OBSTETRICS & GYNECOLOGY

## 2024-05-21 PROCEDURE — 3077F SYST BP >= 140 MM HG: CPT | Performed by: OBSTETRICS & GYNECOLOGY

## 2024-05-21 PROCEDURE — 3078F DIAST BP <80 MM HG: CPT | Performed by: OBSTETRICS & GYNECOLOGY

## 2024-05-21 RX ORDER — ESTRADIOL 10 UG/1
INSERT VAGINAL
Qty: 24 TABLET | Refills: 4 | Status: SHIPPED | OUTPATIENT
Start: 2024-05-21

## 2024-05-21 RX ORDER — CIPROFLOXACIN 250 MG/1
250 TABLET, FILM COATED ORAL 2 TIMES DAILY
Qty: 14 TABLET | Refills: 0 | Status: SHIPPED | OUTPATIENT
Start: 2024-05-21 | End: 2024-05-28

## 2024-05-21 NOTE — PROGRESS NOTES
"Subjective   Patient ID: Sarita Morelos \"Leslie\" is a 74 y.o. female who presents for follow up.  HPI  73-year-old with recurrent urinary tract infections and recent acute urinary tract infection, history of mid urethral sling, mixed urinary incontinence, urge predominant, history of Botox and July 2020 InterStim placement, vaginal atrophy, and pelvic floor weakness.     The patients UA is grossly infected today 05/21/2024. She reports that her urgency and frequency complaints were under control until April 2024, which is when she started noting worsening.      She denies any vaginal complaints, no abnormal bleeding or discharge.     She denies any bowel related complaints, no fecal or flatal incontinence.    She has no other complaints.      From Previous note  This visit was performed through telemedicine   73-year-old with recurrent urinary tract infections and acute urinary tract infection, history of mid urethral sling, mixed urinary incontinence, urge predominant, history of Botox and July 2020 InterStim placement, vaginal atrophy, and pelvic floor weakness.    Patient had a UTI 11/15/2023, culture demonstrated E. Coli, she was started on Ciprofloxacin Treatment of cure urine culture prior to her Botox procedure.   She is noting improvements in her lower urinary tract symptoms.    She could not get an MRI due to her InterStim device. It was discussed that explantation of the device is possible with her intradetrusor Botox.     She denies any bowel related complaints, no fecal or flatal incontinence.    She has no other complaints.      From Previous note   73-year-old with recurrent urinary tract infections and acute urinary tract infection, history of mid urethral sling, mixed urinary incontinence, urge predominant, history of Botox and July 2020 InterStim placement, vaginal atrophy, and pelvic floor weakness.         The patient presents for her device interrogation but was unable to connect to " the device.     She denies any bowel related complaints, no fecal or flatal incontinence.     She denies any vaginal complaints, no abnormal bleeding or discharge.      She has no other complaints.        From Previous note  73-year-old with recurrent urinary tract infections and acute urinary tract infection, history of mid urethral sling, mixed urinary incontinence, urge predominant, history of Botox and July 2020 InterStim placement, vaginal atrophy, and pelvic floor weakness.     The patient was last seen in April 2022. She reports of having a mass in her pelvis, she denies any draining or bleeding from it. She denies any pain.      She did have an episode of bowel obstruction since our last appointment.     She has since stopped her Myrbetriq therapy and has noted worsening lower urinary tract urgency and frequency complaints. She did last interrogate her InterStim 1 month ago.     She has no other complaints     From previous note  The following visit was performed to telemedicine  72-year-old with recurrent urinary tract infections, history of mid urethral sling, mixed urinary incontinence, urge predominant, with history of Botox and July 2020 InterStim placement by Dr. Rod, with vaginal atrophy, and pelvic floor weakness.     The patient continues her cefdinir, vaginal estrogen therapy, and d-mannose therapy. She has stopped her oxybutynin therapy. Her InterStim was adjusted 12/14/2021 and she continues to note significant improvements in her lower urinary tract urgency and frequency complaints. However, she notes daytime urgency roughly every 2-4 hours. She notes 0-1 episodes of nocturia but does have significant urgency associated with this and concerns regarding leaking on the way to the bathroom with this urge. Roughly 7 days ago she increased her Myrbetriq 25 mg to 50 mg. She did feel that last night and the night before she had significant improvements in her nocturia complaints.     She otherwise  "denies any UTI symptoms.     She has no other complaints.        From previous note  71-year-old presenting as a self-referral with urinary urgency, frequency, incontinence, and history of chronic urinary tract infections and nephrolithiasis.     The patient has a complicated lower urinary tract history. She originally underwent a TVT sling in 2005 with anterior repair with Dr. Dugan. She has undergone multiple ureteroscopy's with laser lithotripsy and extracorporeal shockwave therapy over these last 15 years. Her last stone treatment was in 2015. She is also undergone multiple Botox treatments with Dr. Rod. She has undergone 100 units and most recently in November 2019 200 units. This was complicated by retention with concomitant use of oxybutynin but this resolved. In 2020 the patient underwent successful InterStim placement. She felt that this was working well until the last \"few months\". Over the last few weeks she has noted worsening urgency and frequency up to every 20 to 45 minutes. She has utilized her home InterStim patient controller noting appropriate function and use.     The patient also has a history of chronic urinary tract infections. She states that she was on prophylactic cephalexin use in the past which improved her lower urinary tract symptoms. She has not been taking this for the last 6 to 12 months.     Today's urinalysis is positive for nitrites. She does feel that she has a urinary tract infection. She does have allergies to sulfa and Macrobid.     She notes episodic stress urinary incontinence which is worsened with her associated worsening urge and urge related incontinence. She has noted blood in her urine \"a few weeks ago\" but denies any gross hematuria recently.     She denies any vaginal complaints including vaginal bleeding or discharge. She denies any prolapse complaints. She is not sexually active.     She denies any bowel complaints. She denies any fecal or flatal " incontinence.     She has no other complaints.   Review of Systems  Constitutional: No fever, No chills and No fatigue.   Eyes: No vision problems and No dryness of the eyes.   ENT: No dry mouth, No hearing loss and No nosebleeds.   Cardiovascular: No chest pain, No palpitations and No orthopnea.   Respiratory: No shortness of breath, No cough and No wheezing.   Gastrointestinal: No abdominal pain, No constipation, No nausea, No diarrhea, No vomiting and No melena.   Genitourinary: As noted in HPI.   Musculoskeletal: No back pain, No myalgias, No muscle weakness, No joint swelling and No leg edema.   Integumentary: No rashes, No skin lesion and No itching.   Neurological: No headache, No numbness and No dizziness.   Psychiatric: No sleep disturbances, No anxiety and No depression.   Endocrine: No hot flashes, No loss of hair and No hirsutism.   Hematologic/Lymphatic: No swollen glands, No tendency for easy bleeding and No tendency for easy bruising.   All other systems have been reviewed and are negative for complaint.        Objective   Physical Exam    PHYSICAL EXAMINATION:  No LMP recorded. Patient is postmenopausal.  Body mass index is 41.79 kg/m².  /69   Pulse 84   Wt 104 kg (228 lb 8 oz)   BMI 41.79 kg/m²   General Appearance: well appearing  Neuro: Alert and oriented   HEENT: mucous membranes moist, neck supple  Resp: No respiratory distress, normal work of breathing  MSK: normal range of motion, gait appropriate        Assessment/Plan    73-year-old with recurrent urinary tract infections and recent acute urinary tract infection, history of mid urethral sling, mixed urinary incontinence, urge predominant, history of Botox and July 2020 InterStim placement, vaginal atrophy, and pelvic floor weakness having undergone removal of InterStim and IPG lead and 100 units Botox 12/21/2023.     #1 the patient has noted significant improvements in her lower urinary tract complaints previously following  appropriate ciprofloxacin treatment with her present urinary tract infection.  She presents today with grossly infected urine.  We again discussed the treatment algorithm for the patient's chronic urinary tract infections. The patient is allergic to Macrobid and sulfa. She was previously on cefdinir but has since stopped this.. She will continue her cranberry extract tablets and d-mannose therapy as well as continuing to push fluids. She does have a history of kidney stones. She was previously provided a new standing order for urinalysis and urine culture. We discussed the possibility of using methenamine therapy in the future.  We discussed the importance of restarting her vaginal estrogen therapy as well.  She has trouble using the cream and she was prescribed Vagifem tablets.     2.  Patient had significant improvements in her lower urinary tract symptoms following her Botox therapy but unfortunately over the last month has noted significant worsening of her complaints likely associated with an untreated UTI. Her Myrbetriq therapy was not adequate.  She previously had an InterStim device but this battery  and this was explanted 2023.  She has previously undergone intradetrusor Botox in  with Dr. Rod and was well controlled with her InterStim placed in 2020. She has since stopped her oxybutynin therapy due to constipation and dry mouth complaints.  Her previous Botox therapy to be performed in the operating room due to patient discomfort was canceled due to an acute urinary tract infection.  We will readdress her lower urinary tract symptoms following adequate treatment of her UTI and discussed possible repeat 100 units Botox in the operating room.    3. we discussed the patient's history of nephrolithiasis. 10/12/2021 upper tract imaging demonstrated an 8 mm nonobstructive right-sided stone. We will continue expectant management of this.     4. We have previously discussed her concerns for  "vaginal mass. She was noted to have a noninfected Bartholin's duct cyst 8/28/2023. These have \"come and gone\" and we discussed continued expectant management.      5. The patient will be contacted with the results of her urine culture should she require alternate therapy from her ciprofloxacin.  We will readdress her lower urinary tract symptoms in 2 to 3 weeks.     NAYLA Valdez MD      Scribe Attestation  By signing my name below, I, Dexter Koehler attest that this documentation has been prepared under the direction and in the presence of Jona Valdez MD. All medical record entries made by the Scribe were at my direction or personally dictated by me. I have reviewed the chart and agree that the record accurately reflects my personal performance of the history, physical exam, discussion and plan.    "

## 2024-05-21 NOTE — PATIENT INSTRUCTIONS
Please start your ciprofloxacin twice daily for the next 7 days.  You will be contacted should you require an alternate therapy pending her urine culture results.    You have been provided a standing order for urinalysis and urine culture.  Should you have any further UTI complaints, please present immediately to any [facility] lab and drop off a urine sample.    Please restart your vaginal estrogen therapy.  You have been provided a prescription for Vagifem vaginal estrogen tablets.  Please contact the clinic should you have any issues with this.  This will significantly reduce your risk of urinary tract infections.    Please continue your daily cranberry extract tablets.    Please contact the clinic with any questions or concerns.    [phone]

## 2024-05-22 DIAGNOSIS — I10 PRIMARY HYPERTENSION: ICD-10-CM

## 2024-05-22 RX ORDER — METOPROLOL TARTRATE 75 MG/1
75 TABLET, FILM COATED ORAL DAILY
Qty: 90 TABLET | Refills: 0 | Status: SHIPPED | OUTPATIENT
Start: 2024-05-22

## 2024-06-03 ENCOUNTER — SOCIAL WORK (OUTPATIENT)
Dept: BEHAVIORAL HEALTH | Facility: CLINIC | Age: 74
End: 2024-06-03
Payer: COMMERCIAL

## 2024-06-03 DIAGNOSIS — F33.1 MDD (MAJOR DEPRESSIVE DISORDER), RECURRENT EPISODE, MODERATE (MULTI): Primary | ICD-10-CM

## 2024-06-03 PROCEDURE — 90837 PSYTX W PT 60 MINUTES: CPT

## 2024-06-03 NOTE — PROGRESS NOTES
"Solution-Focused Therapy Follow up Note  Session Time: 8:03 AM -9:05 AM       Patient  Sarita Morelos \"Leslie\" is a 74 y.o. female, presenting for a follow-up visit.     An interactive audio and video telecommunication system which permits real time communications between the patient (at the originating site) and provider (at the distant site) was utilized to provide this telehealth service.     Verbal consent was requested and obtained from Sarita Morelos on this date, 24 for a telehealth visit.     Session conducted virtually via HIPAA compliant video.  Patient was provided with informed consent.    Chief Complaint   Patient presents with    MDD (Major Depressive Disorder)     Session #1    Updates/Session Content  Pt update:  Sister passed away 4 days before bday, feels she has had to handle settling everything beginning with paying for sister's  bc sister's children did not step up to help.  In fact, felt bipolar niece was battling her instead of helping.  Felt the service ended up beautiful, though.      Has had pt thinking about her own mortality, wants to make sure she is prepared.   Feels she is doing \"fair to middling\", but not going out much, sleeping a lot (15 hours a day sometimes!).  Does get dressed every day.  If people call, pt will make attempt to go out, and does still go to [employer].  Will go to see her youngest grandkid.    With sister gone, pt is now the \"go to\" person for the family.  Does not like the role.  Feels obligated to help everyone, but is tired and knows she needs to take care of herself.    No help in house for cleaning house.  Pt is disabled, hard for her to clean, cook, so house is a mess and they don't eat much.  Has had back surgery, knee issues, carpal tunnel.  Had to do early alf.  Has been mostly disabled for the last 15 years.  Got lots of house repairs that need to be done as well.  Pt keeps telling herself that she can do some " things little by little, but acknowledges that it tends to take all of her energy just to get ready for the day and keep up her bathroom and kitchen.    Thing bothering pt the most is the condition of her home.  Son and  do not help, have no incentive to help    Discussion and homework:  Gave pt space to provide update.  Discussed grief.    Discussed fine line between enabling and helping, importance of self-care.  Discussed importance of being realistic about capabilities to be able to come up with realistic solutions.  Will discuss this more at next session.    Therapy components:  identifying key behaviors  Monitored dysfunctional automatic thoughts by examining the evidence and identifiying cognitive errors.  Used problem-solving strategy.    Interventions Provided: Problem Solving Treatment, Solution Focused Therapy, Strengths Exploration, Values Exploration, Develop Coping Strategies, and Treatment Planning    Response to Intervention: Open, Engaged, and Receptive    Mental Status  General Appearance & Grooming: Appropriate  Behavior:  Appropriate  Mood: Normal  Sensorium/Cognition: No impairment and Alert & Oriented x 3    Additional Session Information  NA    Progress Made: Gaining Insight    Follow Up / Next Appointment:  6/26/2024

## 2024-06-04 ENCOUNTER — APPOINTMENT (OUTPATIENT)
Dept: GASTROENTEROLOGY | Facility: CLINIC | Age: 74
End: 2024-06-04
Payer: MEDICARE

## 2024-06-18 NOTE — PSYCHOTHERAPY
"Updates/Session Content  Pt update:  Sister passed away 4 days before bday, felt she had to pay for sister's .  Had to deal with bipolar niece.  [place] the service was beautiful.  Thinking about her own mortality, wants to make sure she is prepared.  Almost everything is settled for her sister's bank account.  Feels she is doing \"fair to middling\", but not going out much, sleeping a lot (15 hours a day sometimes!).  Does get dressed every day.  If people call, pt will make attempt to go, does still go to [place].  Will go to see her youngest grandkid.    With sister gone, pt is now the \"go to\" person for the family.  Does not like the role.  Feels obligated to help everyone, but is tired and knows she needs to take care of herself.    Want to go to [facility] to see son, but don't know who will care for dog.  Also wants to go on train ride    No help in house for cleaning house.  Pt is disabled, hard for her to clean, cook, so house is a mess and they don't eat much.  Has had back surgery, knee issues, carpal tunnel.  Had to do early penitentiary.  Has been mostly disabled for the last 15 years.  Got lots of house repairs that need to be done as well.  Pt keeps telling herself that she can do some things little by little, but acknowledges that it tends to take all of her energy to get ready for the day and keep up her bathroom and kitchen.    Thing bothering pt the most is the condition of her home.  Son and  do not help, have no incentive to help    Discussion and homework:  Gave pt space to provide update.  Discussed grief.    Discussed fine line between enabling and helping, importance of self-care.  Discussed importance of being realistic about capabilities to be able to come up with realistic solutions.  "

## 2024-06-19 ENCOUNTER — APPOINTMENT (OUTPATIENT)
Dept: PRIMARY CARE | Facility: CLINIC | Age: 74
End: 2024-06-19
Payer: MEDICARE

## 2024-06-19 ENCOUNTER — LAB (OUTPATIENT)
Dept: LAB | Facility: LAB | Age: 74
End: 2024-06-19
Payer: MEDICARE

## 2024-06-19 VITALS — WEIGHT: 225 LBS | DIASTOLIC BLOOD PRESSURE: 90 MMHG | SYSTOLIC BLOOD PRESSURE: 140 MMHG | BODY MASS INDEX: 41.15 KG/M2

## 2024-06-19 DIAGNOSIS — K52.9 INFLAMMATORY BOWEL DISEASES (IBD): ICD-10-CM

## 2024-06-19 DIAGNOSIS — K83.8 DILATED CBD, ACQUIRED: ICD-10-CM

## 2024-06-19 DIAGNOSIS — N18.30 STAGE 3 CHRONIC KIDNEY DISEASE, UNSPECIFIED WHETHER STAGE 3A OR 3B CKD (MULTI): ICD-10-CM

## 2024-06-19 DIAGNOSIS — N39.0 RECURRENT UTI: ICD-10-CM

## 2024-06-19 DIAGNOSIS — M17.12 ARTHRITIS OF LEFT KNEE: ICD-10-CM

## 2024-06-19 DIAGNOSIS — Z00.00 ROUTINE GENERAL MEDICAL EXAMINATION AT HEALTH CARE FACILITY: ICD-10-CM

## 2024-06-19 DIAGNOSIS — M79.645 THUMB PAIN, LEFT: ICD-10-CM

## 2024-06-19 DIAGNOSIS — E55.9 VITAMIN D DEFICIENCY: Primary | ICD-10-CM

## 2024-06-19 DIAGNOSIS — Z00.00 ANNUAL PHYSICAL EXAM: ICD-10-CM

## 2024-06-19 DIAGNOSIS — M80.0B9D: ICD-10-CM

## 2024-06-19 DIAGNOSIS — I10 HYPERTENSION, UNSPECIFIED TYPE: ICD-10-CM

## 2024-06-19 DIAGNOSIS — G95.9 CERVICAL MYELOPATHY (MULTI): ICD-10-CM

## 2024-06-19 DIAGNOSIS — E11.22 TYPE 2 DIABETES MELLITUS WITH CHRONIC KIDNEY DISEASE, WITHOUT LONG-TERM CURRENT USE OF INSULIN, UNSPECIFIED CKD STAGE (MULTI): ICD-10-CM

## 2024-06-19 DIAGNOSIS — Z13.820 OSTEOPOROSIS SCREENING: ICD-10-CM

## 2024-06-19 DIAGNOSIS — I10 PRIMARY HYPERTENSION: ICD-10-CM

## 2024-06-19 DIAGNOSIS — M17.0 OSTEOARTHRITIS OF BOTH KNEES, UNSPECIFIED OSTEOARTHRITIS TYPE: ICD-10-CM

## 2024-06-19 LAB
ALBUMIN SERPL BCP-MCNC: 4.1 G/DL (ref 3.4–5)
ALP SERPL-CCNC: 140 U/L (ref 33–136)
ALT SERPL W P-5'-P-CCNC: 18 U/L (ref 7–45)
ANION GAP SERPL CALC-SCNC: 14 MMOL/L (ref 10–20)
AST SERPL W P-5'-P-CCNC: 19 U/L (ref 9–39)
BILIRUB SERPL-MCNC: 0.5 MG/DL (ref 0–1.2)
BUN SERPL-MCNC: 17 MG/DL (ref 6–23)
CALCIUM SERPL-MCNC: 9.7 MG/DL (ref 8.6–10.6)
CHLORIDE SERPL-SCNC: 106 MMOL/L (ref 98–107)
CHOLEST SERPL-MCNC: 127 MG/DL (ref 0–199)
CHOLESTEROL/HDL RATIO: 2.2
CO2 SERPL-SCNC: 26 MMOL/L (ref 21–32)
CREAT SERPL-MCNC: 0.95 MG/DL (ref 0.5–1.05)
CREAT UR-MCNC: 177.6 MG/DL (ref 20–320)
EGFRCR SERPLBLD CKD-EPI 2021: 64 ML/MIN/1.73M*2
EST. AVERAGE GLUCOSE BLD GHB EST-MCNC: 117 MG/DL
GLUCOSE SERPL-MCNC: 93 MG/DL (ref 74–99)
HBA1C MFR BLD: 5.7 %
HDLC SERPL-MCNC: 57.7 MG/DL
LDLC SERPL CALC-MCNC: 58 MG/DL
MICROALBUMIN UR-MCNC: 25.6 MG/L
MICROALBUMIN/CREAT UR: 14.4 UG/MG CREAT
NON HDL CHOLESTEROL: 69 MG/DL (ref 0–149)
POTASSIUM SERPL-SCNC: 4.2 MMOL/L (ref 3.5–5.3)
PROT SERPL-MCNC: 8 G/DL (ref 6.4–8.2)
SODIUM SERPL-SCNC: 142 MMOL/L (ref 136–145)
TRIGL SERPL-MCNC: 58 MG/DL (ref 0–149)
VLDL: 12 MG/DL (ref 0–40)

## 2024-06-19 PROCEDURE — 1159F MED LIST DOCD IN RCRD: CPT | Performed by: INTERNAL MEDICINE

## 2024-06-19 PROCEDURE — G0439 PPPS, SUBSEQ VISIT: HCPCS | Performed by: INTERNAL MEDICINE

## 2024-06-19 PROCEDURE — 1036F TOBACCO NON-USER: CPT | Performed by: INTERNAL MEDICINE

## 2024-06-19 PROCEDURE — 3080F DIAST BP >= 90 MM HG: CPT | Performed by: INTERNAL MEDICINE

## 2024-06-19 PROCEDURE — 87086 URINE CULTURE/COLONY COUNT: CPT

## 2024-06-19 PROCEDURE — 85027 COMPLETE CBC AUTOMATED: CPT | Performed by: INTERNAL MEDICINE

## 2024-06-19 PROCEDURE — 1170F FXNL STATUS ASSESSED: CPT | Performed by: INTERNAL MEDICINE

## 2024-06-19 PROCEDURE — 80053 COMPREHEN METABOLIC PANEL: CPT | Performed by: INTERNAL MEDICINE

## 2024-06-19 PROCEDURE — 81001 URINALYSIS AUTO W/SCOPE: CPT

## 2024-06-19 PROCEDURE — 82306 VITAMIN D 25 HYDROXY: CPT | Performed by: INTERNAL MEDICINE

## 2024-06-19 PROCEDURE — 1124F ACP DISCUSS-NO DSCNMKR DOCD: CPT | Performed by: INTERNAL MEDICINE

## 2024-06-19 PROCEDURE — 82043 UR ALBUMIN QUANTITATIVE: CPT | Performed by: INTERNAL MEDICINE

## 2024-06-19 PROCEDURE — 86706 HEP B SURFACE ANTIBODY: CPT | Performed by: INTERNAL MEDICINE

## 2024-06-19 PROCEDURE — 99214 OFFICE O/P EST MOD 30 MIN: CPT | Performed by: INTERNAL MEDICINE

## 2024-06-19 PROCEDURE — 84443 ASSAY THYROID STIM HORMONE: CPT | Performed by: INTERNAL MEDICINE

## 2024-06-19 PROCEDURE — 86803 HEPATITIS C AB TEST: CPT | Performed by: INTERNAL MEDICINE

## 2024-06-19 PROCEDURE — 1160F RVW MEDS BY RX/DR IN RCRD: CPT | Performed by: INTERNAL MEDICINE

## 2024-06-19 PROCEDURE — 80061 LIPID PANEL: CPT | Performed by: INTERNAL MEDICINE

## 2024-06-19 PROCEDURE — 3077F SYST BP >= 140 MM HG: CPT | Performed by: INTERNAL MEDICINE

## 2024-06-19 PROCEDURE — 82607 VITAMIN B-12: CPT | Performed by: INTERNAL MEDICINE

## 2024-06-19 PROCEDURE — 83036 HEMOGLOBIN GLYCOSYLATED A1C: CPT | Performed by: INTERNAL MEDICINE

## 2024-06-19 RX ORDER — CELECOXIB 100 MG/1
100 CAPSULE ORAL 2 TIMES DAILY
Qty: 60 CAPSULE | Refills: 0 | Status: SHIPPED | OUTPATIENT
Start: 2024-06-19 | End: 2024-07-19

## 2024-06-19 RX ORDER — UBIDECARENONE 30 MG
30 CAPSULE ORAL DAILY
COMMUNITY
End: 2024-06-19 | Stop reason: WASHOUT

## 2024-06-19 RX ORDER — AMLODIPINE BESYLATE 10 MG/1
10 TABLET ORAL DAILY
Qty: 90 TABLET | Refills: 3 | Status: SHIPPED | OUTPATIENT
Start: 2024-06-19 | End: 2024-06-19 | Stop reason: SDUPTHER

## 2024-06-19 RX ORDER — AMLODIPINE BESYLATE 10 MG/1
10 TABLET ORAL DAILY
Qty: 90 TABLET | Refills: 3 | Status: SHIPPED | OUTPATIENT
Start: 2024-06-19

## 2024-06-19 ASSESSMENT — ENCOUNTER SYMPTOMS
GASTROINTESTINAL NEGATIVE: 1
RESPIRATORY NEGATIVE: 1
CONSTITUTIONAL NEGATIVE: 1
CARDIOVASCULAR NEGATIVE: 1
ARTHRALGIAS: 1

## 2024-06-19 ASSESSMENT — ACTIVITIES OF DAILY LIVING (ADL)
TAKING_MEDICATION: INDEPENDENT
MANAGING_FINANCES: INDEPENDENT
GROCERY_SHOPPING: INDEPENDENT
DOING_HOUSEWORK: INDEPENDENT
BATHING: INDEPENDENT
DRESSING: INDEPENDENT

## 2024-06-19 ASSESSMENT — PATIENT HEALTH QUESTIONNAIRE - PHQ9
SUM OF ALL RESPONSES TO PHQ9 QUESTIONS 1 AND 2: 2
2. FEELING DOWN, DEPRESSED OR HOPELESS: SEVERAL DAYS
10. IF YOU CHECKED OFF ANY PROBLEMS, HOW DIFFICULT HAVE THESE PROBLEMS MADE IT FOR YOU TO DO YOUR WORK, TAKE CARE OF THINGS AT HOME, OR GET ALONG WITH OTHER PEOPLE: NOT DIFFICULT AT ALL
1. LITTLE INTEREST OR PLEASURE IN DOING THINGS: SEVERAL DAYS

## 2024-06-19 NOTE — PROGRESS NOTES
Subjective   Reason for Visit: Sarita Morelos is an 74 y.o. female here for a Medicare Wellness visit.     Past Medical, Surgical, and Family History reviewed and updated in chart.    Reviewed all medications by prescribing practitioner or clinical pharmacist (such as prescriptions, OTCs, herbal therapies and supplements) and documented in the medical record.    HPI    Patient Care Team:  Marilee Hodges MD as PCP - General  Marilee Hodges MD as PCP - [facility] Attributed Provider     Review of Systems   Constitutional: Negative.    Respiratory: Negative.     Cardiovascular: Negative.    Gastrointestinal: Negative.    Musculoskeletal:  Positive for arthralgias.       Objective   Vitals:  /90   Wt 102 kg (225 lb)   BMI 41.15 kg/m²       Physical Exam  Neurological:      Mental Status: She is alert.   Psychiatric:         Mood and Affect: Mood normal.         Assessment/Plan   Problem List Items Addressed This Visit          Medium    Chronic renal disease, stage III (Multi)    Relevant Orders    CBC    Comprehensive metabolic panel    Vitamin D 25-Hydroxy,Total (for eval of Vitamin D levels)    Hemoglobin A1c    Lipid panel    Tsh With Reflex To Free T4 If Abnormal    Albumin, urine, random    XR DEXA bone density    Hypertension    Relevant Medications    amLODIPine (Norvasc) 10 mg tablet    Other Relevant Orders    CBC    Comprehensive metabolic panel    Vitamin D 25-Hydroxy,Total (for eval of Vitamin D levels)    Hemoglobin A1c    Lipid panel    Tsh With Reflex To Free T4 If Abnormal    Albumin, urine, random    XR DEXA bone density    CBC    Comprehensive metabolic panel    Vitamin D 25-Hydroxy,Total (for eval of Vitamin D levels)    Hemoglobin A1c    Lipid panel    Tsh With Reflex To Free T4 If Abnormal    Albumin, urine, random    XR DEXA bone density    Osteoarthritis of knee    Relevant Orders    CBC    Comprehensive metabolic panel    Vitamin D 25-Hydroxy,Total (for eval of  Vitamin D levels)    Hemoglobin A1c    Lipid panel    Tsh With Reflex To Free T4 If Abnormal    Albumin, urine, random    XR DEXA bone density    Vitamin D deficiency - Primary    Relevant Orders    XR DEXA bone density    Type 2 diabetes mellitus with chronic kidney disease, without long-term current use of insulin, unspecified CKD stage (Multi)    Relevant Orders    CBC    Comprehensive metabolic panel    Vitamin D 25-Hydroxy,Total (for eval of Vitamin D levels)    Hemoglobin A1c    Lipid panel    Tsh With Reflex To Free T4 If Abnormal    Albumin, urine, random    XR DEXA bone density     Other Visit Diagnoses       Osteoporosis screening        Relevant Orders    XR DEXA bone density    Dilated cbd, acquired        Relevant Orders    CBC    Comprehensive metabolic panel    Vitamin D 25-Hydroxy,Total (for eval of Vitamin D levels)    Hemoglobin A1c    Lipid panel    Tsh With Reflex To Free T4 If Abnormal    Albumin, urine, random    XR DEXA bone density    Age-related osteoporosis with current pathological fracture, unspecified pelvis, subsequent encounter for fracture with routine healing        Relevant Orders    XR DEXA bone density    Routine general medical examination at health care facility            The patient is being seen for the subsequent annual wellness visit and follow up.  Past Medical, Surgical and Family History: reviewed and updated in chart.   Interval History: Patient has not been hospitalized previously.   Medications and Supplements: Review of all medications by a prescribing practitioner or clinical pharmacist (such as prescriptions, OTCs, herbal therapies and supplements) documented in the medical record.    No, the patient is not using opioids.   Health Risk Assessment:. Paper HRA completed by patient and scanned into chart.   Depression/Suicide Screening:  .   Done  No falls in the past year.  No recent hospitalizations.  Advance care planning completed.    Recent loss of her sister,  who was 3 years older.  The patient denies any suicidal thoughts, but she does feel depressed at times, no good support in the family, but has network of friends and is seeing a counselor.  L thumb pain, likely tendinitis vs arthritis, recommend ortho for injection, Voltaren topically didn't help.  She is due for repeat fasting BW.  Trial of Celebrex for arthritis.  Refilling BP meds  Follow up in 3 months     Medicare Wellness Billing Compliance Satisfied    *This is a visual tool to show completion of required items on the day of the visit. Green checks will only appear on the date of visit.    Review all medications by prescribing practitioner or clinical pharmacist (such as prescriptions, OTCs, herbal therapies and supplements) documented in the medical record    Past Medical, Surgical, and Family History reviewed and updated in chart    Tobacco Use Reviewed    Alcohol Use Reviewed    Illicit Drug Use Reviewed    PHQ2/9    Falls in Last Year Reviewed    Home Safety Risk Factors Reviewed    Cognitive Impairment Reviewed    Patient Self Assessment and Health Status    Current Diet Reviewed    Exercise Frequency    ADL - Hearing Impairment    ADL - Bathing    ADL - Dressing    ADL - Walks in Home    IADL - Managing Finances    IADL - Grocery Shopping    IADL - Taking Medications    IADL - Doing Housework

## 2024-06-20 LAB
25(OH)D3 SERPL-MCNC: 43 NG/ML (ref 30–100)
APPEARANCE UR: ABNORMAL
BACTERIA UR CULT: ABNORMAL
BILIRUB UR STRIP.AUTO-MCNC: NEGATIVE MG/DL
COLOR UR: YELLOW
ERYTHROCYTE [DISTWIDTH] IN BLOOD BY AUTOMATED COUNT: 14 % (ref 11.5–14.5)
GLUCOSE UR STRIP.AUTO-MCNC: NORMAL MG/DL
HBV SURFACE AB SER-ACNC: <3.1 MIU/ML
HCT VFR BLD AUTO: 40.4 % (ref 36–46)
HCV AB SER QL: NONREACTIVE
HGB BLD-MCNC: 12.6 G/DL (ref 12–16)
HOLD SPECIMEN: NORMAL
HYALINE CASTS #/AREA URNS AUTO: ABNORMAL /LPF
KETONES UR STRIP.AUTO-MCNC: NEGATIVE MG/DL
LEUKOCYTE ESTERASE UR QL STRIP.AUTO: ABNORMAL
MCH RBC QN AUTO: 27.9 PG (ref 26–34)
MCHC RBC AUTO-ENTMCNC: 31.2 G/DL (ref 32–36)
MCV RBC AUTO: 90 FL (ref 80–100)
MUCOUS THREADS #/AREA URNS AUTO: ABNORMAL /LPF
NITRITE UR QL STRIP.AUTO: NEGATIVE
NRBC BLD-RTO: 0 /100 WBCS (ref 0–0)
PH UR STRIP.AUTO: 5.5 [PH]
PLATELET # BLD AUTO: 256 X10*3/UL (ref 150–450)
PROT UR STRIP.AUTO-MCNC: ABNORMAL MG/DL
RBC # BLD AUTO: 4.51 X10*6/UL (ref 4–5.2)
RBC # UR STRIP.AUTO: ABNORMAL /UL
RBC #/AREA URNS AUTO: >20 /HPF
SP GR UR STRIP.AUTO: 1.02
SQUAMOUS #/AREA URNS AUTO: ABNORMAL /HPF
TSH SERPL-ACNC: 1.16 MIU/L (ref 0.44–3.98)
UROBILINOGEN UR STRIP.AUTO-MCNC: ABNORMAL MG/DL
VIT B12 SERPL-MCNC: 381 PG/ML (ref 211–911)
WBC # BLD AUTO: 7.7 X10*3/UL (ref 4.4–11.3)
WBC #/AREA URNS AUTO: >50 /HPF

## 2024-06-26 ENCOUNTER — APPOINTMENT (OUTPATIENT)
Dept: BEHAVIORAL HEALTH | Facility: CLINIC | Age: 74
End: 2024-06-26
Payer: MEDICARE

## 2024-06-26 DIAGNOSIS — F33.1 MDD (MAJOR DEPRESSIVE DISORDER), RECURRENT EPISODE, MODERATE (MULTI): Primary | ICD-10-CM

## 2024-06-26 PROCEDURE — 90837 PSYTX W PT 60 MINUTES: CPT

## 2024-06-26 NOTE — PROGRESS NOTES
"Solution-Focused Therapy Follow up Note  Session Time: 10:02 AM -11:04 AM       Patient  Sarita Morelos \"Leslie\" is a 74 y.o. female, presenting for a follow-up visit.     An interactive audio and video telecommunication system which permits real time communications between the patient (at the originating site) and provider (at the distant site) was utilized to provide this telehealth service.     Verbal consent was requested and obtained from Sarita Morelos on this date, 06/26/24 for a telehealth visit.     Session conducted virtually via HIPAA compliant video.  Patient was provided with informed consent.    Chief Complaint   Patient presents with    MDD (Major Depressive Disorder)     Session #2    Updates/Session Content  Pt update:  Not eating well--often eats just one meal a day.    Tense all of the time.  Wondering why.  A lot going on today, but usually is a \"couch potato\", doesn't have any energy.    Sits around thinking about all of the things she should be doing.  All feels overwhelming bc there is so much she needs/wants to do, and she does have physical limitations.     Discussion and homework:  Discussed some approaches to accomplishing tasks, knowing that she will not have help from her family.  Probably best to approach slowly, but surely, discussed options.  Discussed importance of making sure pt is eating enough.  May be tired bc not getting enough nutrition/fuel every day.    Therapy components:  identifying key behaviors and activity scheduling  Monitored dysfunctional automatic thoughts by examining the evidence and identifiying cognitive errors.  Used problem-solving strategy.    Interventions Provided: Problem Solving Treatment, Behavioral Activation, Solution Focused Therapy, Strengths Exploration, Values Exploration, and Develop Coping Strategies  Assignment(s) Given  Pt agreed to complete the following assignments: Eat regularly, and pick a few tasks to accomplish " by next session: organize shoes, [employer] boxes, July birthday cards--do one task until done, however long it takes.  Focus on getting things done slowly.    Response to Intervention: Open, Engaged, and Receptive    Mental Status  General Appearance & Grooming: Appropriate  Behavior:  Appropriate  Mood: Normal  Sensorium/Cognition: No impairment and Alert & Oriented x 3    Additional Session Information  NA    Progress Made: Gaining Insight    Follow Up / Next Appointment:  7/18/2024

## 2024-06-28 ENCOUNTER — APPOINTMENT (OUTPATIENT)
Dept: ORTHOPEDIC SURGERY | Facility: CLINIC | Age: 74
End: 2024-06-28
Payer: MEDICARE

## 2024-07-12 DIAGNOSIS — E78.5 DYSLIPIDEMIA: ICD-10-CM

## 2024-07-13 RX ORDER — ATORVASTATIN CALCIUM 40 MG/1
40 TABLET, FILM COATED ORAL NIGHTLY
Qty: 90 TABLET | Refills: 0 | Status: SHIPPED | OUTPATIENT
Start: 2024-07-13

## 2024-07-15 DIAGNOSIS — E11.22 TYPE 2 DIABETES MELLITUS WITH CHRONIC KIDNEY DISEASE, WITHOUT LONG-TERM CURRENT USE OF INSULIN, UNSPECIFIED CKD STAGE (MULTI): ICD-10-CM

## 2024-07-15 DIAGNOSIS — E66.01 MORBID OBESITY (MULTI): ICD-10-CM

## 2024-07-18 ENCOUNTER — APPOINTMENT (OUTPATIENT)
Dept: BEHAVIORAL HEALTH | Facility: CLINIC | Age: 74
End: 2024-07-18
Payer: MEDICARE

## 2024-07-18 DIAGNOSIS — F33.1 MDD (MAJOR DEPRESSIVE DISORDER), RECURRENT EPISODE, MODERATE (MULTI): Primary | ICD-10-CM

## 2024-07-18 PROCEDURE — 90834 PSYTX W PT 45 MINUTES: CPT

## 2024-07-18 NOTE — PROGRESS NOTES
"Solution-Focused Therapy Follow up Note  Session Time: 10:05 AM -10:51 AM       Patient  Sarita Morelos \"Leslie\" is a 74 y.o. female, presenting for a follow-up visit.     An interactive audio and video telecommunication system which permits real time communications between the patient (at the originating site) and provider (at the distant site) was utilized to provide this telehealth service.     Verbal consent was requested and obtained from Sarita Morelos on this date, 07/18/24 for a telehealth visit.     Session conducted virtually via HIPAA compliant video.  Patient was provided with informed consent.    Chief Complaint   Patient presents with    MDD (Major Depressive Disorder)     Session #3    Updates/Session Content  Pt update:  Work being done on the roof today--a lot of knocking, started really early.  Pt glad is getting done.  Thumb has been hurting for over a month now, won't bend, has appt on Monday.   Going to [place] next week for family reunion--is nervous about it, but plans to make a trip of it--4 days.    Has been doing pretty well--eating better, trying to cook more.  Feeling a little more energy, but not much.    Has done a few more tasks, though--bathroom, 50% of shoes, some laundry, cleaned out dog dishes, spending time outside, went out with friends, talking to sons more.  Feels good to get some things done    Worrying about property taxes, political and world events.    Would like to be more functional, but pain keeps pt from doing a lot.  Not keen on another referral to pain management; worried she will not be able to follow through on recommendations.    Discussion and homework:  Discussed pt's progress with tasks and eating, pt planning to keep tackling tasks slowly.  Gave pt space to vent about family reunion and things she is worried about.  Discussed pt's worries about potentially working with pain management.     Therapy components:  identifying key " behaviors and identifying pleasant or meaningful activities  Monitored dysfunctional automatic thoughts by examining the evidence and identifiying cognitive errors.  Used problem-solving strategy.    Interventions Provided: Behavioral Activation, Solution Focused Therapy, Strengths Exploration, Values Exploration, Develop Coping Strategies, and Review Progress/Goals  Assignment(s) Given  Pt agreed to complete the following assignments: Get in touch with PCP about pain, continue eating progress, and working on tasks slowly.    Response to Intervention: Open, Engaged, and Receptive    Mental Status  General Appearance & Grooming: Appropriate  Behavior:  Appropriate  Mood: Normal  Sensorium/Cognition: No impairment and Alert & Oriented x 3    Additional Session Information  NA    Progress Made: Gaining Insight    Follow Up / Next Appointment:  8/29/2024

## 2024-07-22 ENCOUNTER — APPOINTMENT (OUTPATIENT)
Dept: ORTHOPEDIC SURGERY | Facility: HOSPITAL | Age: 74
End: 2024-07-22
Payer: MEDICARE

## 2024-07-30 ENCOUNTER — HOSPITAL ENCOUNTER (OUTPATIENT)
Dept: RADIOLOGY | Facility: HOSPITAL | Age: 74
Discharge: HOME | End: 2024-07-30
Payer: MEDICARE

## 2024-07-30 ENCOUNTER — OFFICE VISIT (OUTPATIENT)
Dept: ORTHOPEDIC SURGERY | Facility: HOSPITAL | Age: 74
End: 2024-07-30
Payer: MEDICARE

## 2024-07-30 DIAGNOSIS — M79.645 THUMB PAIN, LEFT: ICD-10-CM

## 2024-07-30 PROCEDURE — 3048F LDL-C <100 MG/DL: CPT | Performed by: STUDENT IN AN ORGANIZED HEALTH CARE EDUCATION/TRAINING PROGRAM

## 2024-07-30 PROCEDURE — 20600 DRAIN/INJ JOINT/BURSA W/O US: CPT | Mod: LT | Performed by: STUDENT IN AN ORGANIZED HEALTH CARE EDUCATION/TRAINING PROGRAM

## 2024-07-30 PROCEDURE — 2500000004 HC RX 250 GENERAL PHARMACY W/ HCPCS (ALT 636 FOR OP/ED): Performed by: STUDENT IN AN ORGANIZED HEALTH CARE EDUCATION/TRAINING PROGRAM

## 2024-07-30 PROCEDURE — 1125F AMNT PAIN NOTED PAIN PRSNT: CPT | Performed by: STUDENT IN AN ORGANIZED HEALTH CARE EDUCATION/TRAINING PROGRAM

## 2024-07-30 PROCEDURE — 73130 X-RAY EXAM OF HAND: CPT | Mod: LT

## 2024-07-30 PROCEDURE — 2500000005 HC RX 250 GENERAL PHARMACY W/O HCPCS: Performed by: STUDENT IN AN ORGANIZED HEALTH CARE EDUCATION/TRAINING PROGRAM

## 2024-07-30 PROCEDURE — 1159F MED LIST DOCD IN RCRD: CPT | Performed by: STUDENT IN AN ORGANIZED HEALTH CARE EDUCATION/TRAINING PROGRAM

## 2024-07-30 PROCEDURE — 99213 OFFICE O/P EST LOW 20 MIN: CPT | Performed by: STUDENT IN AN ORGANIZED HEALTH CARE EDUCATION/TRAINING PROGRAM

## 2024-07-30 PROCEDURE — L3924 HFO WITHOUT JOINTS PRE OTS: HCPCS | Performed by: STUDENT IN AN ORGANIZED HEALTH CARE EDUCATION/TRAINING PROGRAM

## 2024-07-30 PROCEDURE — 3061F NEG MICROALBUMINURIA REV: CPT | Performed by: STUDENT IN AN ORGANIZED HEALTH CARE EDUCATION/TRAINING PROGRAM

## 2024-07-30 PROCEDURE — 3044F HG A1C LEVEL LT 7.0%: CPT | Performed by: STUDENT IN AN ORGANIZED HEALTH CARE EDUCATION/TRAINING PROGRAM

## 2024-07-30 PROCEDURE — 73130 X-RAY EXAM OF HAND: CPT | Mod: LEFT SIDE | Performed by: RADIOLOGY

## 2024-07-30 ASSESSMENT — PAIN - FUNCTIONAL ASSESSMENT: PAIN_FUNCTIONAL_ASSESSMENT: 0-10

## 2024-07-30 ASSESSMENT — PAIN SCALES - GENERAL: PAINLEVEL_OUTOF10: 7

## 2024-07-30 NOTE — PROGRESS NOTES
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 Discussed her treatment options at length. She elected to receive a cortisone injection today to the MCP joint which she tolerated well. She will follow up in clinic as needed should she desire another cortisone injection or wish to consider future surgical management. She was in agreement with plan, all questions answered.      S Inj/Asp: L thumb MCP on 7/31/2024 8:51 AM  Indications: pain  Details: 25 G needle, dorsal approach  Medications: 6 mg betamethasone acet,sod phos 6 mg/mL; 0.5 mL lidocaine 10 mg/mL (1 %)  Outcome: tolerated well, no immediate complications  Procedure, treatment alternatives, risks and benefits explained, specific risks discussed. Consent was given by the patient. Immediately prior to procedure a time out was called to verify the correct patient, procedure, equipment, support staff and site/side marked as required. Patient was prepped and draped in the usual sterile fashion.            HISTORY OF PRESENT ILLNESS       Patient is a 74 y.o. right-hand dominant female, who presents today for evaluation of the left thumb. She states that for multiple months she has been having pain over the inner aspect of her thumb and making it difficult to move her thumb. She denies numbness or tingling into the thumb or rest of the hand. She denies traumatic injury to the hand. She used to work as a . She has not tried any treatments yet for her pain. She uses one cane in each hand to ambulate.       REVIEW OF SYSTEMS       A 30-item multi-system Review Of Systems was obtained on today's intake form.  This was reviewed with the patient and is correct.  The pertinent positives and negatives are listed above.  The form has been scanned separately into the medical record.      PHYSICAL EXAM    Constitutional:    Appears stated  age. Well-developed and well-nourished.  Psychiatric:         Pleasant normal mood and affect. Behavior is appropriate for the situation.   Head:                   Normocephalic and atraumatic.  Eyes:                    Pupils are equal and round.  Cardiovascular:  2+ radial and ulnar pulses. Fingers well-perfused.  Respiratory:        Effort normal. No respiratory distress. Speaking in complete sentences.  Neurologic:       Alert and oriented to person, place, and time.  Skin:                Skin is intact, warm and dry.  Hematologic / Lymphatic:    No lymphedema or lymphangitis.    Extremities / Musculoskeletal:                      On examination of her left thumb, pain is elicited with metacarpophalangeal joint flexion and extension. Negative CMC grind test. MCP joint is stable to radioulnar stress.  She is also tender to the IP joint of her thumb.  She is nontender over the A1 pulley.  She is nontender over the collateral ligaments.  2+ radial pulse. Sensation intact to light touch throughout the hand.       IMAGING / LABS / EMGs           X-rays of the left hand were obtained and independently reviewed demonstrating diffuse osteoarthritic changes of the carpus as well as the 1st CMC joint, MCP joint, and IP joint. No acute fracture.       Past Medical History:   Diagnosis Date    Abnormal weight gain 03/15/2018    Weight gain following gastric bypass surgery    Arrhythmia     tachycardia post op 7/2022 - evaluated and placed on metoprolol with no further issues    Arthritis     Body mass index (BMI) 45.0-49.9, adult (Multi) 07/06/2022    Body mass index (BMI) of 45.0 to 49.9 in adult    Body mass index (BMI)40.0-44.9, adult 03/03/2021    BMI 40.0-44.9, adult    Cervical myelopathy (Multi)     Chronic lumbar radiculopathy     CKD (chronic kidney disease)     stage 3    Conjunctival pigmentations, bilateral 10/03/2022    Conjunctival melanosis of both eyes    Depression     Dry eye syndrome of bilateral lacrimal  glands 10/03/2022    Dry eyes, bilateral    Gastro-esophageal reflux disease without esophagitis 03/15/2018    Gastric reflux    Gross hematuria 06/05/2020    Gross hematuria    Heart murmur     Hiatal hernia     HLD (hyperlipidemia)     HTN (hypertension)     Iron deficiency anemia, unspecified 04/08/2022    Iron deficiency anemia, unspecified iron deficiency anemia type    Kidney stones     Morbid (severe) obesity due to excess calories (Multi) 07/06/2022    Class 3 severe obesity with serious comorbidity and body mass index (BMI) of 45.0 to 49.9 in adult, unspecified obesity type    Myopia, bilateral 10/03/2022    Myopia of both eyes with astigmatism and presbyopia    Nephrolithiasis     OA (osteoarthritis)     OAB (overactive bladder)     Osteopenia     Osteoporosis     Other conditions influencing health status     Herniated Disc (C5 - C6)    Other conditions influencing health status     Multiple Renal Cysts    Other conditions influencing health status 05/22/2015    History of pregnancy    Pain in left shoulder 03/19/2016    Left shoulder pain, unspecified chronicity    Pain in unspecified shoulder 07/15/2015    Shoulder pain    Personal history of other diseases of the nervous system and sense organs 07/30/2014    History of sleep apnea    Personal history of other drug therapy 05/23/2018    History of pneumococcal vaccination    Personal history of other specified conditions 04/25/2014    History of fatigue    Polyp of colon 05/23/2018    Benign colon polyp    Prediabetes 07/23/2015    Prediabetes    Prediabetes 10/03/2022    Pre-diabetes, A1C= 6.0% on 5/1/23    Spinal stenosis     Spondylolisthesis     Thyrotoxicosis with diffuse goiter without thyrotoxic crisis or storm 03/15/2018    Goiter with hyperthyroidism    Thyrotoxicosis, unspecified without thyrotoxic crisis or storm 03/15/2018    Overactive thyroid gland    Type 2 diabetes mellitus without complications (Multi) 09/29/2015    Controlled diabetes  mellitus type II without complication    Unspecified astigmatism, unspecified eye 03/15/2018    Astigmatism with presbyopia    Unspecified retinoschisis, right eye 05/21/2018    Retinoschisis of right eye    Unspecified visual loss 04/25/2014    Vision problems    Vitamin D deficiency        Medication Documentation Review Audit       Reviewed by Emelia Conway LPN (Licensed Nurse) on 07/30/24 at 1255      Medication Order Taking? Sig Documenting Provider Last Dose Status   acetaminophen (Tylenol 8 HOUR) 650 mg ER tablet [id number] No Take 1 tablet (650 mg) by mouth every 8 hours if needed for mild pain (1 - 3). Do not crush, chew, or split. Historical Provider, MD Taking Active   amLODIPine (Norvasc) 10 mg tablet [id number]  Take 1 tablet (10 mg) by mouth once daily. Marilee Hodges MD  Active   atorvastatin (Lipitor) 40 mg tablet [id number]  TAKE ONE TABLET BY MOUTH EVERY DAY AT BEDTIME Marilee Hodges MD  Active   azelastine (Optivar) 0.05 % ophthalmic solution [id number] No 1 drop both eyes up to 2 times a day as needed for itching/allergies Bebe Quintana MD Taking Active   cholecalciferol (Vitamin D-3) 25 MCG (1000 UT) tablet [id number] No Take 1 tablet (25 mcg) by mouth once daily. Historical Provider, MD Taking Active   diclofenac sodium (Voltaren) 1 % gel gel [id number] No APPLY 1 APPLICATION TOPICALLY 4 TIMES A DAY. Marilee Hodges MD Taking Active   DULoxetine (Cymbalta) 60 mg DR capsule [id number] No Take 2 capsules (120 mg) by mouth once daily. Nilo Leigh MD PhD Taking Active   estradiol (Vagifem) 10 mcg tablet vaginal tablet [id number]  Place 1 tablet nightly for 2 weeks then 2 times a week thereafter. Jona Valdez MD  Active   ferrous sulfate 325 (65 Fe) MG tablet [id number] No Take 1 tablet by mouth 1 (one) time per week. Historical Provider, MD Taking Active   metoprolol tartrate (Lopressor) 75 mg tablet [id number]  Take 1 tablet (75 mg) by mouth once daily. Marilee GRIFFIN  MD Carroll  Active   multivit with minerals/lutein (MULTIVITAMIN 50 PLUS ORAL) [id number] No Take 1 tablet by mouth 3 times a week. Historical Provider, MD Taking Active   omeprazole (PriLOSEC) 20 mg DR capsule [id number] No Take 1 capsule (20 mg) by mouth once daily as needed. Historical Provider, MD Taking Active   semaglutide (OZEMPIC) 1 mg/dose (4 mg/3 mL) pen injector [id number]  Inject 1 mg under the skin 1 (one) time per week. Joselyn Farah, APRN-CNP  Active   turmeric root extract 500 mg capsule [id number] No Take 1 capsule by mouth once daily. Historical Provider, MD Taking Active   ubidecarenone (CO Q-10 ORAL) [id number]  Take 1 tablet by mouth once daily. Historical Provider, MD  Active                    Allergies   Allergen Reactions    Gabapentin Swelling    Nitrofurantoin Swelling    Sulfamethoxazole-Trimethoprim Itching       Social History     Socioeconomic History    Marital status:      Spouse name: Not on file    Number of children: Not on file    Years of education: Not on file    Highest education level: Not on file   Occupational History    Not on file   Tobacco Use    Smoking status: Never    Smokeless tobacco: Never   Vaping Use    Vaping status: Never Used   Substance and Sexual Activity    Alcohol use: Yes     Comment: 1 glass of wine a month    Drug use: Never    Sexual activity: Defer   Other Topics Concern    Not on file   Social History Narrative    Not on file     Social Determinants of Health     Financial Resource Strain: Low Risk  (2/13/2024)    Overall Financial Resource Strain (CARDIA)     Difficulty of Paying Living Expenses: Not hard at all   Food Insecurity: No Food Insecurity (2/13/2024)    Hunger Vital Sign     Worried About Running Out of Food in the Last Year: Never true     Ran Out of Food in the Last Year: Never true   Transportation Needs: No Transportation Needs (2/13/2024)    PRAPARE - Transportation     Lack of Transportation (Medical): No     Lack of  Transportation (Non-Medical): No   Physical Activity: Insufficiently Active (2/13/2024)    Exercise Vital Sign     Days of Exercise per Week: 7 days     Minutes of Exercise per Session: 10 min   Stress: No Stress Concern Present (2/13/2024)    [employer] [employer] [place] [employer] - Occupational Stress Questionnaire     Feeling of Stress : Only a little   Social Connections: Socially Integrated (2/13/2024)    Social Connection and Isolation Panel [NHANES]     Frequency of Communication with Friends and Family: More than three times a week     Frequency of Social Gatherings with Friends and Family: More than three times a week     Attends Gnosticist Services: More than 4 times per year     Active Member of Clubs or Organizations: Yes     Attends Club or Organization Meetings: 1 to 4 times per year     Marital Status:    Intimate Partner Violence: Not At Risk (2/13/2024)    Humiliation, Afraid, Rape, and Kick questionnaire     Fear of Current or Ex-Partner: No     Emotionally Abused: No     Physically Abused: No     Sexually Abused: No   Housing Stability: Low Risk  (2/13/2024)    Housing Stability Vital Sign     Unable to Pay for Housing in the Last Year: No     Number of Places Lived in the Last Year: 1     Unstable Housing in the Last Year: No       Past Surgical History:   Procedure Laterality Date    APPENDECTOMY  11/15/2017    Appendectomy    BLADDER SUSPENSION      mid urethral sling    CARPAL TUNNEL RELEASE  03/29/2013    Neuroplasty Decompression Median Nerve At Carpal Tunnel    CATARACT EXTRACTION Bilateral     CERVICAL DISCECTOMY  07/01/2015    Spinal Diskectomy Cervical    CHOLECYSTECTOMY      COLONOSCOPY  05/31/2018    Complete Colonoscopy    CYSTOCELE REPAIR      GASTRIC BYPASS  08/22/2016    Gastric Surgery For Morbid Obesity Gastric Bypass    LITHOTRIPSY  02/07/2014    Renal Lithotripsy    LUMBAR LAMINECTOMY      OTHER SURGICAL HISTORY  10/2021    interstim placement    TOTAL KNEE ARTHROPLASTY   10/04/2018    Knee Replacement, right

## 2024-07-31 PROCEDURE — 2500000005 HC RX 250 GENERAL PHARMACY W/O HCPCS: Performed by: STUDENT IN AN ORGANIZED HEALTH CARE EDUCATION/TRAINING PROGRAM

## 2024-07-31 PROCEDURE — 2500000004 HC RX 250 GENERAL PHARMACY W/ HCPCS (ALT 636 FOR OP/ED): Performed by: STUDENT IN AN ORGANIZED HEALTH CARE EDUCATION/TRAINING PROGRAM

## 2024-07-31 PROCEDURE — 20600 DRAIN/INJ JOINT/BURSA W/O US: CPT | Mod: LT | Performed by: STUDENT IN AN ORGANIZED HEALTH CARE EDUCATION/TRAINING PROGRAM

## 2024-07-31 RX ORDER — LIDOCAINE HYDROCHLORIDE 10 MG/ML
0.5 INJECTION INFILTRATION; PERINEURAL
Status: COMPLETED | OUTPATIENT
Start: 2024-07-31 | End: 2024-07-31

## 2024-07-31 RX ORDER — BETAMETHASONE SODIUM PHOSPHATE AND BETAMETHASONE ACETATE 3; 3 MG/ML; MG/ML
6 INJECTION, SUSPENSION INTRA-ARTICULAR; INTRALESIONAL; INTRAMUSCULAR; SOFT TISSUE
Status: COMPLETED | OUTPATIENT
Start: 2024-07-31 | End: 2024-07-31

## 2024-08-13 ENCOUNTER — OFFICE VISIT (OUTPATIENT)
Dept: BEHAVIORAL HEALTH | Facility: CLINIC | Age: 74
End: 2024-08-13
Payer: MEDICARE

## 2024-08-13 VITALS
WEIGHT: 225.8 LBS | RESPIRATION RATE: 20 BRPM | SYSTOLIC BLOOD PRESSURE: 170 MMHG | BODY MASS INDEX: 41.3 KG/M2 | TEMPERATURE: 98.3 F | DIASTOLIC BLOOD PRESSURE: 81 MMHG

## 2024-08-13 DIAGNOSIS — F41.8 MIXED ANXIETY AND DEPRESSIVE DISORDER: ICD-10-CM

## 2024-08-13 DIAGNOSIS — F32.A DEPRESSION, UNSPECIFIED DEPRESSION TYPE: ICD-10-CM

## 2024-08-13 PROCEDURE — 1125F AMNT PAIN NOTED PAIN PRSNT: CPT | Performed by: PSYCHIATRY & NEUROLOGY

## 2024-08-13 PROCEDURE — 3061F NEG MICROALBUMINURIA REV: CPT | Performed by: PSYCHIATRY & NEUROLOGY

## 2024-08-13 PROCEDURE — 3079F DIAST BP 80-89 MM HG: CPT | Performed by: PSYCHIATRY & NEUROLOGY

## 2024-08-13 PROCEDURE — 99214 OFFICE O/P EST MOD 30 MIN: CPT | Mod: AM | Performed by: PSYCHIATRY & NEUROLOGY

## 2024-08-13 PROCEDURE — 1159F MED LIST DOCD IN RCRD: CPT | Performed by: PSYCHIATRY & NEUROLOGY

## 2024-08-13 PROCEDURE — 3048F LDL-C <100 MG/DL: CPT | Performed by: PSYCHIATRY & NEUROLOGY

## 2024-08-13 PROCEDURE — 3077F SYST BP >= 140 MM HG: CPT | Performed by: PSYCHIATRY & NEUROLOGY

## 2024-08-13 PROCEDURE — 99214 OFFICE O/P EST MOD 30 MIN: CPT | Performed by: PSYCHIATRY & NEUROLOGY

## 2024-08-13 PROCEDURE — 3044F HG A1C LEVEL LT 7.0%: CPT | Performed by: PSYCHIATRY & NEUROLOGY

## 2024-08-13 PROCEDURE — 1160F RVW MEDS BY RX/DR IN RCRD: CPT | Performed by: PSYCHIATRY & NEUROLOGY

## 2024-08-13 RX ORDER — DULOXETIN HYDROCHLORIDE 60 MG/1
120 CAPSULE, DELAYED RELEASE ORAL DAILY
Qty: 180 CAPSULE | Refills: 1 | Status: SHIPPED | OUTPATIENT
Start: 2024-08-13 | End: 2025-02-09

## 2024-08-13 ASSESSMENT — ANXIETY QUESTIONNAIRES
IF YOU CHECKED OFF ANY PROBLEMS ON THIS QUESTIONNAIRE, HOW DIFFICULT HAVE THESE PROBLEMS MADE IT FOR YOU TO DO YOUR WORK, TAKE CARE OF THINGS AT HOME, OR GET ALONG WITH OTHER PEOPLE: NOT DIFFICULT AT ALL
2. NOT BEING ABLE TO STOP OR CONTROL WORRYING: NOT AT ALL
7. FEELING AFRAID AS IF SOMETHING AWFUL MIGHT HAPPEN: NOT AT ALL
1. FEELING NERVOUS, ANXIOUS, OR ON EDGE: NOT AT ALL
4. TROUBLE RELAXING: NOT AT ALL
3. WORRYING TOO MUCH ABOUT DIFFERENT THINGS: NOT AT ALL
GAD7 TOTAL SCORE: 0
5. BEING SO RESTLESS THAT IT IS HARD TO SIT STILL: NOT AT ALL
6. BECOMING EASILY ANNOYED OR IRRITABLE: NOT AT ALL

## 2024-08-13 ASSESSMENT — PATIENT HEALTH QUESTIONNAIRE - PHQ9
1. LITTLE INTEREST OR PLEASURE IN DOING THINGS: NOT AT ALL
SUM OF ALL RESPONSES TO PHQ9 QUESTIONS 1 AND 2: 0
2. FEELING DOWN, DEPRESSED OR HOPELESS: NOT AT ALL

## 2024-08-13 ASSESSMENT — ENCOUNTER SYMPTOMS
LOSS OF SENSATION IN FEET: 1
DEPRESSION: 0
OCCASIONAL FEELINGS OF UNSTEADINESS: 1

## 2024-08-13 ASSESSMENT — COLUMBIA-SUICIDE SEVERITY RATING SCALE - C-SSRS
ATTEMPT LIFETIME: NO
2. HAVE YOU ACTUALLY HAD ANY THOUGHTS OF KILLING YOURSELF?: NO
1. HAVE YOU WISHED YOU WERE DEAD OR WISHED YOU COULD GO TO SLEEP AND NOT WAKE UP?: NO
TOTAL  NUMBER OF ABORTED OR SELF INTERRUPTED ATTEMPTS LIFETIME: NO
TOTAL  NUMBER OF INTERRUPTED ATTEMPTS LIFETIME: NO
6. HAVE YOU EVER DONE ANYTHING, STARTED TO DO ANYTHING, OR PREPARED TO DO ANYTHING TO END YOUR LIFE?: NO

## 2024-08-13 ASSESSMENT — PAIN SCALES - GENERAL: PAINLEVEL: 5

## 2024-08-13 NOTE — PROGRESS NOTES
Subjective   Patient ID: Leslie Morelos is a 74 y.o. female who presents for Follow-up I am doing pretty good.     HPI: The patient reports that she is doing well.     MSE: The patient is alert, fully oriented, language is intact, and recent and remote memory intact. The patient denies any suicidal or homicidal ideation or plans. The patient presents with no depressive, manic or psychotic symptoms. Thought is logical and clear. No disturbances of judgment or insight are exhibited. No behavioral disturbances are present on examination.        Review of Systems   Neurological:         The patient is alert, fully oriented, language is intact, and recent and remote memory intact. The patient denies any suicidal or homicidal ideation or plans. The patient presents with no depressive, manic or psychotic symptoms. Thought is logical and clear. No disturbances of judgment or insight are exhibited. No behavioral disturbances are present on examination.       Psychiatric/Behavioral:          The patient is alert, fully oriented, language is intact, and recent and remote memory intact. The patient denies any suicidal or homicidal ideation or plans. The patient presents with no depressive, manic or psychotic symptoms. Thought is logical and clear. No disturbances of judgment or insight are exhibited. No behavioral disturbances are present on examination.       All other systems reviewed and are negative.      Objective   Physical Exam  Constitutional:       Appearance: Normal appearance.   Neurological:      General: No focal deficit present.      Mental Status: She is alert and oriented to person, place, and time. Mental status is at baseline.      Comments: The patient is alert, fully oriented, language is intact, and recent and remote memory intact. The patient denies any suicidal or homicidal ideation or plans. The patient presents with no depressive, manic or psychotic symptoms. Thought is logical and clear. No  disturbances of judgment or insight are exhibited. No behavioral disturbances are present on examination.       Psychiatric:         Mood and Affect: Mood normal.         Behavior: Behavior normal.         Thought Content: Thought content normal.         Judgment: Judgment normal.      Comments: The patient is alert, fully oriented, language is intact, and recent and remote memory intact. The patient denies any suicidal or homicidal ideation or plans. The patient presents with no depressive, manic or psychotic symptoms. Thought is logical and clear. No disturbances of judgment or insight are exhibited. No behavioral disturbances are present on examination.             Lab Review:       Assessment/Plan   Psychiatric Risk Assessment  Violence Risk Assessment: none  Acute Risk of Harm to Others is Considered: low   Suicide Risk Assessment: age > 65 yrs old and   Protective Factors against Suicide: adherence to  treatment, fear of suicide, moral objections to suicide, positive family relationships, and sense of responsibility toward family  Acute Risk of Harm to Self is Considered: low    Imminent Risk of Suicide or Serious Self-Injury: Low   Chronic Risk of Suicide of Serious Self-Injury: Low  Risk factors: Age, depression history and   Protective factors: Denies current suicidal ideation, denies history of suicide attempts , willingness to seek help and support , gender, access to a variety of clinical interventions , and receiving and engaged in care for mental, physical, and substance use disorders      Imminent Risk of Violence or Homicide: Low   Risk Factors: No significant risk factors identified on screening  Protective Factors: Lack of known history of harm to others , Lack of known history of violent ideation , and lack of known access to firearms.     Diagnosis: anxiety and depression in remission.     Treatment plan:   The FDA risks, benefits & alternatives to the medications prescribed were  explained to you today. You were able to understand & repeat these risks, benefits & alternatives to these prescribed medications. You have agreed to proceed with treatment with the medications discussed based on the conclusion that the benefit outweighs the risks of this treatment regimen: continue duloxetine 120 mg daily.     Follow up in January of 2025.     Continue with your therapist.      Psych Review of Symptoms:    ADHD: Patient denied any symptoms.         Anxiety: Patient denied any symptoms.         Developmental and Sensory Concerns: Patient denied any symptoms.         Depressive Symptoms: Patient denied any symptoms.         Disruptive and Conduct Symptoms: Patient denied any symptoms.         Eating / Feeding Concerns: Patient denied any symptoms.         Elimination Symptoms: Patient denied any symptoms.         Manic Symptoms: Patient denied any symptoms.         Obsessive-Compulsive Symptoms: Patient denied any symptoms.         Psychotic Symptoms: Patient denied any symptoms.           Trauma Related Symptoms: Patient denied any symptoms.           Sleep Concerns: Patient denied any symptoms.

## 2024-08-22 ENCOUNTER — APPOINTMENT (OUTPATIENT)
Dept: PRIMARY CARE | Facility: CLINIC | Age: 74
End: 2024-08-22
Payer: MEDICARE

## 2024-08-22 VITALS
WEIGHT: 222 LBS | DIASTOLIC BLOOD PRESSURE: 68 MMHG | HEART RATE: 74 BPM | HEIGHT: 62 IN | SYSTOLIC BLOOD PRESSURE: 138 MMHG | BODY MASS INDEX: 40.85 KG/M2

## 2024-08-22 DIAGNOSIS — Z12.31 ENCOUNTER FOR SCREENING MAMMOGRAM FOR MALIGNANT NEOPLASM OF BREAST: ICD-10-CM

## 2024-08-22 DIAGNOSIS — I10 HYPERTENSION, UNSPECIFIED TYPE: ICD-10-CM

## 2024-08-22 DIAGNOSIS — I10 PRIMARY HYPERTENSION: ICD-10-CM

## 2024-08-22 DIAGNOSIS — Z00.00 ANNUAL PHYSICAL EXAM: ICD-10-CM

## 2024-08-22 DIAGNOSIS — I47.10 SUPRAVENTRICULAR TACHYCARDIA (CMS-HCC): Primary | ICD-10-CM

## 2024-08-22 PROCEDURE — 99214 OFFICE O/P EST MOD 30 MIN: CPT | Performed by: INTERNAL MEDICINE

## 2024-08-22 PROCEDURE — 3048F LDL-C <100 MG/DL: CPT | Performed by: INTERNAL MEDICINE

## 2024-08-22 PROCEDURE — 1036F TOBACCO NON-USER: CPT | Performed by: INTERNAL MEDICINE

## 2024-08-22 PROCEDURE — 3008F BODY MASS INDEX DOCD: CPT | Performed by: INTERNAL MEDICINE

## 2024-08-22 PROCEDURE — 3044F HG A1C LEVEL LT 7.0%: CPT | Performed by: INTERNAL MEDICINE

## 2024-08-22 PROCEDURE — G2211 COMPLEX E/M VISIT ADD ON: HCPCS | Performed by: INTERNAL MEDICINE

## 2024-08-22 PROCEDURE — 1123F ACP DISCUSS/DSCN MKR DOCD: CPT | Performed by: INTERNAL MEDICINE

## 2024-08-22 PROCEDURE — 3075F SYST BP GE 130 - 139MM HG: CPT | Performed by: INTERNAL MEDICINE

## 2024-08-22 PROCEDURE — 3078F DIAST BP <80 MM HG: CPT | Performed by: INTERNAL MEDICINE

## 2024-08-22 PROCEDURE — 1158F ADVNC CARE PLAN TLK DOCD: CPT | Performed by: INTERNAL MEDICINE

## 2024-08-22 PROCEDURE — 3061F NEG MICROALBUMINURIA REV: CPT | Performed by: INTERNAL MEDICINE

## 2024-08-22 RX ORDER — METOPROLOL TARTRATE 75 MG/1
75 TABLET, FILM COATED ORAL DAILY
Qty: 90 TABLET | Refills: 0 | Status: SHIPPED | OUTPATIENT
Start: 2024-08-22

## 2024-08-22 RX ORDER — AMLODIPINE BESYLATE 10 MG/1
10 TABLET ORAL DAILY
Qty: 90 TABLET | Refills: 3 | Status: SHIPPED | OUTPATIENT
Start: 2024-08-22

## 2024-08-22 ASSESSMENT — ENCOUNTER SYMPTOMS
RESPIRATORY NEGATIVE: 1
CARDIOVASCULAR NEGATIVE: 1
HYPERTENSION: 1
CONSTITUTIONAL NEGATIVE: 1
GASTROINTESTINAL NEGATIVE: 1

## 2024-08-22 NOTE — PROGRESS NOTES
"Subjective   Patient ID: Sarita Morelos \"Leslie\" is a 74 y.o. female who presents for Hypertension and Hyperlipidemia.  Hypertension  This is a chronic problem. The current episode started more than 1 year ago. The problem has been gradually improving since onset. The problem is controlled. Risk factors for coronary artery disease include dyslipidemia and obesity. Past treatments include calcium channel blockers. The current treatment provides significant improvement.   Hyperlipidemia  This is a chronic problem. The current episode started more than 1 year ago. The problem is controlled. Recent lipid tests were reviewed and are normal.       Review of Systems   Constitutional: Negative.    Respiratory: Negative.     Cardiovascular: Negative.    Gastrointestinal: Negative.        Objective   Physical Exam  Neurological:      Mental Status: She is alert.   Psychiatric:         Mood and Affect: Mood normal.         Assessment/Plan   Problem List Items Addressed This Visit          Medium    Hypertension    Relevant Medications    amLODIPine (Norvasc) 10 mg tablet    metoprolol tartrate (Lopressor) 75 mg tablet    Supraventricular tachycardia (CMS-HCC) - Primary     Seeing a cardiologist         Relevant Medications    amLODIPine (Norvasc) 10 mg tablet    metoprolol tartrate (Lopressor) 75 mg tablet     Other Visit Diagnoses       Annual physical exam        Relevant Orders    BI mammo bilateral screening tomosynthesis    Encounter for screening mammogram for malignant neoplasm of breast        Relevant Orders    BI mammo bilateral screening tomosynthesis             HTN.  Well controlled.  Continue with current medications.  Check BP at home daily.  Report to us if the numbers are higher than 130/80.        HLD  Stable  Continue with statins  Check lipids at the next visit    Due for mammogram  Get RSV and tdap at the [facility]           Marilee Hodges MD   "

## 2024-08-27 ASSESSMENT — LIFESTYLE VARIABLES
3_OR_MORE_DRINKS_PER_DAY: N
CURRENT_SMOKER: N

## 2024-08-29 ENCOUNTER — APPOINTMENT (OUTPATIENT)
Dept: BEHAVIORAL HEALTH | Facility: CLINIC | Age: 74
End: 2024-08-29
Payer: MEDICARE

## 2024-08-29 ENCOUNTER — HOSPITAL ENCOUNTER (OUTPATIENT)
Dept: RADIOLOGY | Facility: CLINIC | Age: 74
Discharge: HOME | End: 2024-08-29
Payer: COMMERCIAL

## 2024-08-29 DIAGNOSIS — E55.9 VITAMIN D DEFICIENCY: ICD-10-CM

## 2024-08-29 DIAGNOSIS — K83.8 DILATED CBD, ACQUIRED: ICD-10-CM

## 2024-08-29 DIAGNOSIS — I10 HYPERTENSION, UNSPECIFIED TYPE: ICD-10-CM

## 2024-08-29 DIAGNOSIS — E11.22 TYPE 2 DIABETES MELLITUS WITH CHRONIC KIDNEY DISEASE, WITHOUT LONG-TERM CURRENT USE OF INSULIN, UNSPECIFIED CKD STAGE (MULTI): ICD-10-CM

## 2024-08-29 DIAGNOSIS — M17.0 OSTEOARTHRITIS OF BOTH KNEES, UNSPECIFIED OSTEOARTHRITIS TYPE: ICD-10-CM

## 2024-08-29 DIAGNOSIS — F33.1 MDD (MAJOR DEPRESSIVE DISORDER), RECURRENT EPISODE, MODERATE (MULTI): ICD-10-CM

## 2024-08-29 DIAGNOSIS — Z13.820 OSTEOPOROSIS SCREENING: ICD-10-CM

## 2024-08-29 DIAGNOSIS — M80.0B9D: ICD-10-CM

## 2024-08-29 DIAGNOSIS — N18.30 STAGE 3 CHRONIC KIDNEY DISEASE, UNSPECIFIED WHETHER STAGE 3A OR 3B CKD (MULTI): ICD-10-CM

## 2024-08-29 DIAGNOSIS — I10 PRIMARY HYPERTENSION: ICD-10-CM

## 2024-08-29 PROCEDURE — 77080 DXA BONE DENSITY AXIAL: CPT

## 2024-08-29 PROCEDURE — 90832 PSYTX W PT 30 MINUTES: CPT

## 2024-08-29 NOTE — PROGRESS NOTES
"Solution-Focused Therapy Follow up Note  Session Time: 10:05 AM -10:40 AM       Patient  Sarita Morelos \"Leslie\" is a 74 y.o. female, presenting for a follow-up visit.     An interactive audio and video telecommunication system which permits real time communications between the patient (at the originating site) and provider (at the distant site) was utilized to provide this telehealth service.     Verbal consent was requested and obtained from Sarita Morelos on this date, 08/29/24 for a telehealth visit.     Session conducted virtually via HIPAA compliant video.  Patient was provided with informed consent.    Chief Complaint   Patient presents with    MDD (Major Depressive Disorder)     Session #4    Updates/Session Content  Pt update:  Went to [facility] in [place].  Was really nice, got to meet a lot of people.  Son came and drove the whole way--he had a good time, has new aunties that keep calling him.    Been feeling good lately.  Been getting things cleared out of house, little by little.  Son in [place] considering coming back home, wife contemplating going to nursing school at [employer].    Will be taking care of a  relative's dog for about the next year, Milo.  Still having work done on the house.    Has a torn rotator cuff, refuses surgery.  Also has GI issues.  Feels that she has been feeling good, trying to stay healthy, taking care of things immediately.  Feels she is learning to calm self, has stopped reacting to people/arguing with people.  Wants to do a check-in in three months.    Discussion and homework:  Discussed worries/fears about medical  procedures--shoulder, issues with gut.  Has had numerous surgeries, does not want any more.  Has spoken with her doctors, feels she is ok.  Discussed pt's progress, pt glad to be doing well.    Therapy components:  identifying key behaviors  Monitored dysfunctional automatic thoughts by examining the evidence and " identifiying cognitive errors.  Used problem-solving strategy.    Interventions Provided: Solution Focused Therapy, Strengths Exploration, and Values Exploration  Assignment(s) Given  Pt agreed to complete the following assignments: Keep using coping strategies that are working.    Response to Intervention: Open, Engaged, and Receptive    Mental Status  General Appearance & Grooming: Appropriate  Behavior:  Appropriate  Mood: Normal  Sensorium/Cognition: No impairment and Alert & Oriented x 3    Additional Session Information  NA    Progress Made: Gaining Insight    Follow Up / Next Appointment:  12/3/2024

## 2024-09-20 DIAGNOSIS — E66.01 MORBID OBESITY (MULTI): ICD-10-CM

## 2024-09-20 DIAGNOSIS — E11.22 TYPE 2 DIABETES MELLITUS WITH CHRONIC KIDNEY DISEASE, WITHOUT LONG-TERM CURRENT USE OF INSULIN, UNSPECIFIED CKD STAGE (MULTI): ICD-10-CM

## 2024-09-21 RX ORDER — SEMAGLUTIDE 1.34 MG/ML
1 INJECTION, SOLUTION SUBCUTANEOUS
Qty: 9 ML | Refills: 0 | Status: SHIPPED | OUTPATIENT
Start: 2024-09-22

## 2024-09-27 DIAGNOSIS — E66.01 MORBID OBESITY (MULTI): Primary | ICD-10-CM

## 2024-10-02 ENCOUNTER — APPOINTMENT (OUTPATIENT)
Dept: PODIATRY | Facility: CLINIC | Age: 74
End: 2024-10-02
Payer: MEDICARE

## 2024-10-02 DIAGNOSIS — L85.3 XEROSIS CUTIS: ICD-10-CM

## 2024-10-02 DIAGNOSIS — M79.671 FOOT PAIN, BILATERAL: ICD-10-CM

## 2024-10-02 DIAGNOSIS — E11.22 TYPE 2 DIABETES MELLITUS WITH CHRONIC KIDNEY DISEASE, WITHOUT LONG-TERM CURRENT USE OF INSULIN, UNSPECIFIED CKD STAGE (MULTI): ICD-10-CM

## 2024-10-02 DIAGNOSIS — M79.672 FOOT PAIN, BILATERAL: ICD-10-CM

## 2024-10-02 DIAGNOSIS — E66.01 MORBID OBESITY (MULTI): ICD-10-CM

## 2024-10-02 DIAGNOSIS — E11.41 DIABETIC MONONEUROPATHY ASSOCIATED WITH TYPE 2 DIABETES MELLITUS (MULTI): Primary | ICD-10-CM

## 2024-10-02 PROCEDURE — 3061F NEG MICROALBUMINURIA REV: CPT | Performed by: PODIATRIST

## 2024-10-02 PROCEDURE — 99213 OFFICE O/P EST LOW 20 MIN: CPT | Performed by: PODIATRIST

## 2024-10-02 PROCEDURE — 3048F LDL-C <100 MG/DL: CPT | Performed by: PODIATRIST

## 2024-10-02 PROCEDURE — 3044F HG A1C LEVEL LT 7.0%: CPT | Performed by: PODIATRIST

## 2024-10-02 PROCEDURE — 1159F MED LIST DOCD IN RCRD: CPT | Performed by: PODIATRIST

## 2024-10-02 PROCEDURE — 99203 OFFICE O/P NEW LOW 30 MIN: CPT | Performed by: PODIATRIST

## 2024-10-02 PROCEDURE — 1036F TOBACCO NON-USER: CPT | Performed by: PODIATRIST

## 2024-10-02 PROCEDURE — 1160F RVW MEDS BY RX/DR IN RCRD: CPT | Performed by: PODIATRIST

## 2024-10-02 NOTE — PROGRESS NOTES
Chief Complaint   Patient presents with    DM Foot Care     New Patient is here today for diabetic foot care. PCP referred. Tingling and numbness in both feet.        Patient recent onset diabetes.  Last A1c 5.7%.  Started medication about 2 years ago.  Has a longstanding history of tingling with the feet.  This has been going on for a number of years.  Even before her back surgeries.  Patient was referred by her PCP.  She has been able to manage nails on her own.  She feels comfortable doing this.  She occasionally goes for pedicures.  Does use a moisturizer daily.  Avoids walking barefoot.  Patient states multiple back surgeries.  She has also had cervical surgeries.  Hypertension.  Gabapentin made her swell.  Has had some weight loss on medication. 260 --> 220.   Non-smoker.    Retired .    Medications and allergies reviewed    Review of systems negative except as noted above.    General/Constitutional: Alert. NAD.  Obese.  Respiratory: Non labored breathing.   Psychiatric: Mood and affect normal/baseline.   HEENT: Sclera clear. Wearing corrective lenses.  Dermatologic: Skin and nails are intact.  No subungual debris.  Minimal xerosis noted.  No acute inflammatory infectious process. Web spaces are dry. No ulcers no pre-ulcerative areas.  No tinea pedis.  Vascular: Pedal pulses are intact and symmetric including the dorsalis pedis and the posterior tibial pulses. Feet are warm to touch.  Mild nonpitting edema lower extremities.  Neurological: Alert and oriented. No acute distress. No sensory impairment bilateral.  [employer] monofilament testing is normal bilaterally.  Vibratory sensation is intact.  Musculoskeletal: Strength is normal for age. No acute deficits appreciated.  Range of motion causes no pain.  Digital alignment arch alignment is grossly intact.  Uses 2 canes for ambulation outside the house.    Impression: Diabetic female with neuropathy and obesity.    -Today's treatment and  course of therapy was discussed with the patient in detail. Patient's questions were answered. Proper foot care was discussed. This dictation was done using Dragon computer software and as such may contain grammatical errors.    -Diabetic foot examination.    -At this point recommend ongoing weight loss and keeping active.  Proper shoe gear was discussed.  Avoid excess barefoot walking.  Continue using moisturizer on the feet.    -Numbness is a combination of likely diabetes and back problems and knee surgery.    -Counseled patient on proper nail care.  If you are able to do the this on your own safely you may do so.  If there is any trouble at all please let me know.  Also please exercise caution if you do get a pedicure no attempted to remove ingrown nails and did not disrupt the cuticle.    -Lengthy discussion on diabetic footcare.  Written information given on same.  Patient understanding of this.

## 2024-10-16 ASSESSMENT — ENCOUNTER SYMPTOMS
SORE THROAT: 0
VOMITING: 0
RHINORRHEA: 1
NECK PAIN: 1
COUGH: 0
HEADACHES: 0
DIARRHEA: 0
ABDOMINAL PAIN: 1

## 2024-10-22 ENCOUNTER — APPOINTMENT (OUTPATIENT)
Dept: PRIMARY CARE | Facility: CLINIC | Age: 74
End: 2024-10-22
Payer: MEDICARE

## 2024-10-22 ENCOUNTER — LAB (OUTPATIENT)
Dept: LAB | Facility: LAB | Age: 74
End: 2024-10-22
Payer: MEDICARE

## 2024-10-22 VITALS
HEIGHT: 62 IN | HEART RATE: 91 BPM | WEIGHT: 217 LBS | SYSTOLIC BLOOD PRESSURE: 134 MMHG | BODY MASS INDEX: 39.93 KG/M2 | DIASTOLIC BLOOD PRESSURE: 75 MMHG

## 2024-10-22 DIAGNOSIS — R20.2 NUMBNESS AND TINGLING OF FOOT: Primary | ICD-10-CM

## 2024-10-22 DIAGNOSIS — N39.0 RECURRENT UTI: ICD-10-CM

## 2024-10-22 DIAGNOSIS — R20.0 NUMBNESS AND TINGLING OF FOOT: Primary | ICD-10-CM

## 2024-10-22 DIAGNOSIS — Z23 FLU VACCINE NEED: ICD-10-CM

## 2024-10-22 DIAGNOSIS — E78.5 HYPERLIPIDEMIA, UNSPECIFIED HYPERLIPIDEMIA TYPE: ICD-10-CM

## 2024-10-22 DIAGNOSIS — F33.1 MDD (MAJOR DEPRESSIVE DISORDER), RECURRENT EPISODE, MODERATE: ICD-10-CM

## 2024-10-22 DIAGNOSIS — M25.512 ACUTE PAIN OF LEFT SHOULDER: ICD-10-CM

## 2024-10-22 LAB
CHOLEST SERPL-MCNC: 116 MG/DL (ref 0–199)
CHOLESTEROL/HDL RATIO: 2
HDLC SERPL-MCNC: 58.2 MG/DL
LDLC SERPL CALC-MCNC: 46 MG/DL
NON HDL CHOLESTEROL: 58 MG/DL (ref 0–149)
TRIGL SERPL-MCNC: 57 MG/DL (ref 0–149)
VLDL: 11 MG/DL (ref 0–40)

## 2024-10-22 PROCEDURE — G2211 COMPLEX E/M VISIT ADD ON: HCPCS | Performed by: INTERNAL MEDICINE

## 2024-10-22 PROCEDURE — 99214 OFFICE O/P EST MOD 30 MIN: CPT | Performed by: INTERNAL MEDICINE

## 2024-10-22 PROCEDURE — 80061 LIPID PANEL: CPT

## 2024-10-22 PROCEDURE — 3075F SYST BP GE 130 - 139MM HG: CPT | Performed by: INTERNAL MEDICINE

## 2024-10-22 PROCEDURE — 90662 IIV NO PRSV INCREASED AG IM: CPT | Performed by: INTERNAL MEDICINE

## 2024-10-22 PROCEDURE — G0008 ADMIN INFLUENZA VIRUS VAC: HCPCS | Performed by: INTERNAL MEDICINE

## 2024-10-22 PROCEDURE — 3044F HG A1C LEVEL LT 7.0%: CPT | Performed by: INTERNAL MEDICINE

## 2024-10-22 PROCEDURE — 3008F BODY MASS INDEX DOCD: CPT | Performed by: INTERNAL MEDICINE

## 2024-10-22 PROCEDURE — 1036F TOBACCO NON-USER: CPT | Performed by: INTERNAL MEDICINE

## 2024-10-22 PROCEDURE — 3078F DIAST BP <80 MM HG: CPT | Performed by: INTERNAL MEDICINE

## 2024-10-22 PROCEDURE — 3048F LDL-C <100 MG/DL: CPT | Performed by: INTERNAL MEDICINE

## 2024-10-22 PROCEDURE — 3061F NEG MICROALBUMINURIA REV: CPT | Performed by: INTERNAL MEDICINE

## 2024-10-22 ASSESSMENT — ENCOUNTER SYMPTOMS
HEADACHES: 0
COUGH: 0
VOMITING: 0
ABDOMINAL PAIN: 1
NUMBNESS: 1
DIARRHEA: 0
SORE THROAT: 0
NECK PAIN: 1
RHINORRHEA: 1

## 2024-10-22 ASSESSMENT — LIFESTYLE VARIABLES
AUDIT-C TOTAL SCORE: 1
HOW OFTEN DO YOU HAVE A DRINK CONTAINING ALCOHOL: MONTHLY OR LESS
HOW OFTEN DO YOU HAVE SIX OR MORE DRINKS ON ONE OCCASION: NEVER
HOW MANY STANDARD DRINKS CONTAINING ALCOHOL DO YOU HAVE ON A TYPICAL DAY: 1 OR 2
SKIP TO QUESTIONS 9-10: 1

## 2024-10-22 NOTE — PROGRESS NOTES
I reviewed the resident/fellow's documentation and discussed the patient with the resident/fellow. I agree with the resident/fellow's medical decision making as documented in the note.  As the attending physician, I certify that I personally reviewed the patient's history and personally examined the patient to confirm the physical findings described above, and that I reviewed the relevant imaging studies and available reports. I also discussed the differential diagnosis and all of the proposed management plans with the patient and individuals accompanying the patient to this visit. They had the opportunity to ask questions about the proposed management plans and to have those questions answered.     Marilee Hodges MD

## 2024-10-22 NOTE — PROGRESS NOTES
Subjective   Patient ID: Leslie Morelos is a 74 y.o. female who presents for No chief complaint on file..    Earache   There is pain in the left ear. This is a recurrent problem. The current episode started 1 to 4 weeks ago. The problem occurs every few hours. The problem has been waxing and waning. There has been no fever. The pain is at a severity of 3/10. Associated symptoms include abdominal pain, neck pain and rhinorrhea. Pertinent negatives include no coughing, diarrhea, ear discharge, headaches, hearing loss, rash, sore throat or vomiting.      Left shoulder pain, 2/10, dull pian, lie on back, pain wakes her up from sleep, no xrays were done before, for 1-1 1/2 months, non radiating, comes and goes, tylenol not too much helpful. Numbness in bl feet after knee surgery .  She saw podiatry but not sure they checked for numbness, concerned during driving. A1c 5.6 in June. BS at home . Got RSV and Tdap  in pharmacy  in August. Will take flu shot  today. Mammo pending .       Review of Systems   HENT:  Positive for ear pain and rhinorrhea. Negative for ear discharge, hearing loss and sore throat.    Respiratory:  Negative for cough.    Gastrointestinal:  Positive for abdominal pain. Negative for diarrhea and vomiting.   Musculoskeletal:  Positive for neck pain.        Left shoulder pain   Skin:  Negative for rash.   Neurological:  Positive for numbness (bl feet and Rt ankle sometimes, on and off). Negative for headaches.   All other systems reviewed and are negative.      Objective   There were no vitals taken for this visit.    Physical Exam  Constitutional:       Appearance: She is obese.   Cardiovascular:      Rate and Rhythm: Normal rate and regular rhythm.      Pulses: Normal pulses.      Heart sounds: Normal heart sounds. No murmur heard.     No friction rub. No gallop.   Pulmonary:      Breath sounds: No wheezing, rhonchi or rales.   Abdominal:      General: Bowel sounds are normal.       Palpations: Abdomen is soft.   Musculoskeletal:         General: No swelling or tenderness.      Comments: Pain on flexion of left shoulder joint   Neurological:      Mental Status: She is alert.         Assessment/Plan     BL feet numbness  - having this symptom since her Rt knee surgery, more in rt side, but recently started in left side.   - scared during driving as she sometimes doesn't feel her feet.   - no weakness   - ordered CT lumber spine.    # Left sided shoulder pain  - having pain for the last 1-1 1/2 months.   - on and off, 2/10, non radiating   - referred to PT.     # HTN.  Well controlled.  Continue with current medications.  Check BP at home daily.  Report to us if the numbers are higher than 130/80.         # HLD  Stable  Continue with statins  Ordered lipid today    # Healthcare maintenance   -Mammogram pending  -Received RSV and tdap at the pharmacy after last visit, got flu shot  today  - Lipid panel ordered today.         Problem List Items Addressed This Visit    None

## 2024-10-23 ENCOUNTER — HOSPITAL ENCOUNTER (OUTPATIENT)
Dept: RADIOLOGY | Facility: HOSPITAL | Age: 74
Discharge: HOME | End: 2024-10-23
Payer: MEDICARE

## 2024-10-23 DIAGNOSIS — R20.2 NUMBNESS AND TINGLING OF FOOT: ICD-10-CM

## 2024-10-23 DIAGNOSIS — R20.0 NUMBNESS AND TINGLING OF FOOT: ICD-10-CM

## 2024-10-23 PROCEDURE — 72131 CT LUMBAR SPINE W/O DYE: CPT

## 2024-10-23 PROCEDURE — 72131 CT LUMBAR SPINE W/O DYE: CPT | Performed by: RADIOLOGY

## 2024-10-28 ENCOUNTER — HOSPITAL ENCOUNTER (OUTPATIENT)
Dept: RADIOLOGY | Facility: CLINIC | Age: 74
Discharge: HOME | End: 2024-10-28
Payer: MEDICARE

## 2024-10-28 VITALS — BODY MASS INDEX: 40.96 KG/M2 | HEIGHT: 61 IN | WEIGHT: 216.93 LBS

## 2024-10-28 DIAGNOSIS — Z00.00 ANNUAL PHYSICAL EXAM: ICD-10-CM

## 2024-10-28 DIAGNOSIS — Z12.31 ENCOUNTER FOR SCREENING MAMMOGRAM FOR MALIGNANT NEOPLASM OF BREAST: ICD-10-CM

## 2024-10-28 PROCEDURE — 77063 BREAST TOMOSYNTHESIS BI: CPT | Performed by: RADIOLOGY

## 2024-10-28 PROCEDURE — 77067 SCR MAMMO BI INCL CAD: CPT | Performed by: RADIOLOGY

## 2024-10-28 PROCEDURE — 77067 SCR MAMMO BI INCL CAD: CPT

## 2024-10-30 ENCOUNTER — APPOINTMENT (OUTPATIENT)
Dept: PHARMACY | Facility: HOSPITAL | Age: 74
End: 2024-10-30
Payer: MEDICARE

## 2024-10-30 DIAGNOSIS — E66.01 MORBID OBESITY (MULTI): ICD-10-CM

## 2024-11-05 NOTE — PROGRESS NOTES
"Pharmacist Clinic: Weight Management    Sarita Morelos \"Leslie\" was referred to the Clinical Pharmacy Team for weight management.     Referring Provider: Marilee Hodges, *  - Last visit with referring provider: 10/23/24    HISTORY OF PRESENT ILLNESS    - Patient with baseline BMI = 40.33 referred to assist with titration of GLP-1 agonist therapy for weight loss  - Patient has a history of type 2 diabetes, well controlled with last HbA1c 5.7  - Currently on Ozempic 1 mg weekly    Diet:   - regular, small portions   - Salad, Mobile, rice, Breakfast sandwich   - Save half meals for later   - Avoids snacks- chips and soda    Exercise Routine:   - not discussed    Weight loss:   - Lost 31 lbs  - Current weight: 216 (10/28/2024)  - Baseline weight: 247 lb (11/2023)    Adverse effects: None reported     PHARMACY ASSESSMENT    CURRENT WEIGHT LOSS PHARMACOTHERAPY  - Ozempic 1 mg weekly      AFFORDABILITY/ACCESSIBILITY  - No issues reported    DRUG INTERACTIONS  - No significant drug-drug interactions exist that require adjustment to therapy    Pertinent PMH Review: from chart review  - PMH of Pancreatitis: No  - PMH of Retinopathy: No  - PMH of MTC: No    Allergies: Gabapentin, Nitrofurantoin, and Sulfamethoxazole-trimethoprim     [id number] - [place], [place] - [address] [facility]  [address] [address]  Phone: [phone] Fax: [phone]    [facility] [place] - [facility] AT Portal to Registered [facility] Sites  [facility]  [address] [place] [address]  Phone: [phone] Fax: [phone]    [facility]  [address] [place] [address]  Phone: [phone] Fax: [phone]      LAB  Lab Results   Component Value Date    BILITOT 0.5 06/19/2024    CALCIUM 9.7 06/19/2024    CO2 26 06/19/2024     06/19/2024    CREATININE 0.95 06/19/2024    GLUCOSE 93 06/19/2024    ALKPHOS 140 (H) 06/19/2024    K 4.2 " 06/19/2024    PROT 8.0 06/19/2024     06/19/2024    AST 19 06/19/2024    ALT 18 06/19/2024    BUN 17 06/19/2024    ANIONGAP 14 06/19/2024    MG 1.80 08/01/2022    PHOS 2.5 07/25/2022    ALBUMIN 4.1 06/19/2024    LIPASE 14 09/12/2023    EGFR 64 06/19/2024     Lab Results   Component Value Date    TRIG 57 10/22/2024    CHOL 116 10/22/2024    LDLCALC 46 10/22/2024    HDL 58.2 10/22/2024     Lab Results   Component Value Date    HGBA1C 5.7 (H) 06/19/2024       Current Outpatient Medications on File Prior to Visit   Medication Sig Dispense Refill    acetaminophen (Tylenol 8 HOUR) 650 mg ER tablet Take 1 tablet (650 mg) by mouth every 8 hours if needed for mild pain (1 - 3). Do not crush, chew, or split.      amLODIPine (Norvasc) 10 mg tablet Take 1 tablet (10 mg) by mouth once daily. 90 tablet 3    atorvastatin (Lipitor) 40 mg tablet TAKE ONE TABLET BY MOUTH EVERY DAY AT BEDTIME 90 tablet 0    azelastine (Optivar) 0.05 % ophthalmic solution 1 drop both eyes up to 2 times a day as needed for itching/allergies 6 mL 11    cholecalciferol (Vitamin D-3) 25 MCG (1000 UT) tablet Take 1 tablet (25 mcg) by mouth once daily.      diclofenac sodium (Voltaren) 1 % gel gel APPLY 1 APPLICATION TOPICALLY 4 TIMES A DAY. 100 g 1    DULoxetine (Cymbalta) 60 mg DR capsule Take 2 capsules (120 mg) by mouth once daily. 180 capsule 1    estradiol (Vagifem) 10 mcg tablet vaginal tablet Place 1 tablet nightly for 2 weeks then 2 times a week thereafter. 24 tablet 4    ferrous sulfate 325 (65 Fe) MG tablet Take 1 tablet (325 mg) by mouth 1 (one) time per week.      metoprolol tartrate (Lopressor) 75 mg tablet Take 1 tablet (75 mg) by mouth once daily. 90 tablet 0    multivit with minerals/lutein (MULTIVITAMIN 50 PLUS ORAL) Take 1 tablet by mouth 3 times a week.      omeprazole (PriLOSEC) 20 mg DR capsule Take 1 capsule (20 mg) by mouth once daily as needed.      Ozempic 1 mg/dose (4 mg/3 mL) pen injector INJECT 1MG SUBCUTANEOUSLY  ONCE  WEEKLY 9 mL 0    turmeric root extract 500 mg capsule Take 1 capsule by mouth once daily.      ubidecarenone (CO Q-10 ORAL) Take 1 tablet by mouth once daily.       No current facility-administered medications on file prior to visit.       PATIENT EDUCATION/GOALS  - Target goal 5% to 10% weight loss within 3-6 months  - Counseled patient on MOA, expectations, side effects, duration of therapy, contraindications, administration, and monitoring parameters  - Answered all patient questions and concerns; provided [facility] phone number if issues/questions arise    RECOMMENDATIONS/PLAN  Problem List Items Addressed This Visit       Morbid obesity (Multi)    Relevant Orders    Referral to Clinical Pharmacy       ASSESSMENT:  Patient with baseline BMI = 40.33 is on GLP-1 agonist therapy for type 2 diabetes treatment and weight loss.  Rationale for plan: Patient noticing appetite suppression and weight loss, and denies side effects.  Blood sugars well controlled.  Can reassess for Ozempic dose titration at follow up if weigh loss stalls or blood sugars become uncontrolled.     PLAN:  Continue Ozempic 1 mg weekly, received 3 month supply last fill  Can evaluate for dose titration at follow up  Education provided:   Counseled patient on MOA, expectations, side effects, duration of therapy, contraindications, administration, and monitoring parameters  Answered all patient questions and concerns  Clinical Pharmacist follow up: 12/18/24 1200  PCP follow up: 1/28/25    Continue all meds under the continuation of care with the referring provider and clinical pharmacy team.    Annie Levy, PharmD  Clinical Pharmacy Specialist, Primary Care   [phone]    Verbal consent to manage patient's drug therapy was obtained from patient. They were informed they may decline to participate or withdraw from participation in pharmacy services at any time.

## 2024-11-18 DIAGNOSIS — M48.061 SPINAL STENOSIS OF LUMBAR REGION, UNSPECIFIED WHETHER NEUROGENIC CLAUDICATION PRESENT: ICD-10-CM

## 2024-11-25 DIAGNOSIS — I10 HYPERTENSION, UNSPECIFIED TYPE: ICD-10-CM

## 2024-11-25 DIAGNOSIS — I10 PRIMARY HYPERTENSION: ICD-10-CM

## 2024-11-25 DIAGNOSIS — Z98.84 GASTRIC BYPASS STATUS FOR OBESITY: Primary | ICD-10-CM

## 2024-11-25 RX ORDER — OMEPRAZOLE 20 MG/1
20 CAPSULE, DELAYED RELEASE ORAL DAILY PRN
Qty: 30 CAPSULE | Refills: 2 | Status: SHIPPED | OUTPATIENT
Start: 2024-11-25

## 2024-11-25 RX ORDER — METOPROLOL TARTRATE 75 MG/1
75 TABLET, FILM COATED ORAL DAILY
Qty: 90 TABLET | Refills: 0 | Status: SHIPPED | OUTPATIENT
Start: 2024-11-25

## 2024-11-25 RX ORDER — AMLODIPINE BESYLATE 10 MG/1
10 TABLET ORAL DAILY
Qty: 90 TABLET | Refills: 0 | Status: SHIPPED | OUTPATIENT
Start: 2024-11-25

## 2024-11-27 DIAGNOSIS — E66.01 MORBID OBESITY (MULTI): ICD-10-CM

## 2024-11-27 DIAGNOSIS — E11.22 TYPE 2 DIABETES MELLITUS WITH CHRONIC KIDNEY DISEASE, WITHOUT LONG-TERM CURRENT USE OF INSULIN, UNSPECIFIED CKD STAGE (MULTI): ICD-10-CM

## 2024-11-27 RX ORDER — SEMAGLUTIDE 1.34 MG/ML
1 INJECTION, SOLUTION SUBCUTANEOUS
Qty: 9 ML | Refills: 0 | Status: SHIPPED | OUTPATIENT
Start: 2024-12-01

## 2024-12-01 ASSESSMENT — SLIT LAMP EXAM - LIDS
COMMENTS: GOOD POSITION
COMMENTS: GOOD POSITION

## 2024-12-01 ASSESSMENT — CUP TO DISC RATIO
OD_RATIO: 0.25
OS_RATIO: 0.35

## 2024-12-01 ASSESSMENT — EXTERNAL EXAM - LEFT EYE: OS_EXAM: NORMAL

## 2024-12-01 NOTE — PROGRESS NOTES
Pre-hcikrknkA32.03  -HbA1c= 5.7 (6/19/24). No diabetic retinopathy seen on dilated exam. Continue close monitoring of blood glucose, blood pressure, and cholesterol. F/u 1 year for comprehensive exam.    Cup to disc asymmetry  -OCT RNFL (12/2/24) - SS: 4/10 OD and 5/10 OS. OD: WNL. OS: Bord ST. 79/82.   -Low suspicion, low risk for glaucoma. Monitor for now, no treatment recommended. May consider repeating OCT RNFL in the future if any change on exam.     Dry eyes, vozlzlahuT95.123  Allergic conjunctivitis of both eyesH10.13  -Tearing OS occasionally, also itching  -May use artificial tears 2-3 times a day and warm compresses 1-2x/day  -May use allergy drop Rx: Azelastine OU BID prn itching/allergies. Has allergic symptoms year round (pets).  -May call or return sooner if any worsening of symptoms.     Pseudophakia of left eyeZ96.1 - Complex with Trypan Blue (4/5/18)  PSEUDOPHAKIA OF RIGHT EYEZ96.1 - Complex with Trypan Blue (3/1/18)  -Doing well. Good vision. IOP good. Off all gtts.     Bilateral iisvxjhthjzztK75.103  -Saw Dr. Whittington 3/15/18. Stable exam. Monitor.   -No retinal tear or detachment seen on exam. Monitor with annual dilated exams.     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 Monitor.     Presbyopia  -Rx filled in 2021, but may also use OTC reading glasses or OTC progressives.  -New Rx for glasses given per patient request. Patient's signature obtained to acknowledge and confirm that a paper copy of glasses Rx was given to patient in compliance with [facility] Eyeglass Rule. Electronic copy of Rx will also be available via [employer].         No FH of glaucoma  (+)FH AMD - MGM      PRIOR OCULAR HISTORY:      Periorbital ecchymosis - right eye  -Per patient, fell twice on 10/31 and 11/1 and hit same place on face on deck and rock. Did not go to ER.   -Full motility, no proptosis or enophthalmos. Orbital rim feels intact.   -Monitor for now.

## 2024-12-02 ENCOUNTER — APPOINTMENT (OUTPATIENT)
Dept: OPHTHALMOLOGY | Facility: CLINIC | Age: 74
End: 2024-12-02
Payer: MEDICARE

## 2024-12-02 DIAGNOSIS — H04.123 DRY EYES, BILATERAL: ICD-10-CM

## 2024-12-02 DIAGNOSIS — Z96.1 PSEUDOPHAKIA: ICD-10-CM

## 2024-12-02 DIAGNOSIS — H10.13 ALLERGIC CONJUNCTIVITIS OF BOTH EYES: ICD-10-CM

## 2024-12-02 DIAGNOSIS — H52.4 PRESBYOPIA: ICD-10-CM

## 2024-12-02 DIAGNOSIS — H47.399 PATHOLOGICAL CUPPING OF OPTIC DISC, UNSPECIFIED LATERALITY: ICD-10-CM

## 2024-12-02 DIAGNOSIS — R73.03 PREDIABETES: Primary | ICD-10-CM

## 2024-12-02 DIAGNOSIS — H33.103 BILATERAL RETINOSCHISIS: ICD-10-CM

## 2024-12-02 PROBLEM — H47.393: Status: ACTIVE | Noted: 2024-12-02

## 2024-12-02 PROCEDURE — 92015 DETERMINE REFRACTIVE STATE: CPT | Performed by: OPHTHALMOLOGY

## 2024-12-02 PROCEDURE — 92014 COMPRE OPH EXAM EST PT 1/>: CPT | Performed by: OPHTHALMOLOGY

## 2024-12-02 ASSESSMENT — ENCOUNTER SYMPTOMS
RESPIRATORY NEGATIVE: 0
GASTROINTESTINAL NEGATIVE: 0
ALLERGIC/IMMUNOLOGIC NEGATIVE: 0
PSYCHIATRIC NEGATIVE: 0
CARDIOVASCULAR NEGATIVE: 0
EYES NEGATIVE: 1
NEUROLOGICAL NEGATIVE: 0
MUSCULOSKELETAL NEGATIVE: 0
CONSTITUTIONAL NEGATIVE: 0
HEMATOLOGIC/LYMPHATIC NEGATIVE: 0
ENDOCRINE NEGATIVE: 0

## 2024-12-02 ASSESSMENT — REFRACTION_MANIFEST
OS_ADD: +3.00
OD_CYLINDER: SPHERE
OD_SPHERE: +0.25
OD_ADD: +3.00
OS_CYLINDER: SPHERE
OS_SPHERE: PLANO

## 2024-12-02 ASSESSMENT — VISUAL ACUITY
METHOD: SNELLEN - LINEAR
OD_SC: 20/25+
OS_SC: 20/25

## 2024-12-02 ASSESSMENT — TONOMETRY
OD_IOP_MMHG: 14
IOP_METHOD: GOLDMANN APPLANATION
OS_IOP_MMHG: 14

## 2024-12-02 ASSESSMENT — REFRACTION_WEARINGRX
OD_SPHERE: OTC NVO
OS_SPHERE: OTC NVO

## 2024-12-03 ENCOUNTER — APPOINTMENT (OUTPATIENT)
Dept: BEHAVIORAL HEALTH | Facility: CLINIC | Age: 74
End: 2024-12-03
Payer: MEDICARE

## 2024-12-03 ENCOUNTER — OFFICE VISIT (OUTPATIENT)
Dept: ORTHOPEDIC SURGERY | Facility: CLINIC | Age: 74
End: 2024-12-03
Payer: MEDICARE

## 2024-12-03 VITALS — HEIGHT: 61 IN | BODY MASS INDEX: 40.4 KG/M2 | WEIGHT: 214 LBS

## 2024-12-03 DIAGNOSIS — M47.816 LUMBAR SPONDYLOSIS: Primary | ICD-10-CM

## 2024-12-03 DIAGNOSIS — M51.360 DEGENERATION OF INTERVERTEBRAL DISC OF LUMBAR REGION WITH DISCOGENIC BACK PAIN: ICD-10-CM

## 2024-12-03 DIAGNOSIS — E78.5 DYSLIPIDEMIA: ICD-10-CM

## 2024-12-03 DIAGNOSIS — M96.1 LUMBAR POST-LAMINECTOMY SYNDROME: ICD-10-CM

## 2024-12-03 DIAGNOSIS — M48.061 SPINAL STENOSIS OF LUMBAR REGION, UNSPECIFIED WHETHER NEUROGENIC CLAUDICATION PRESENT: ICD-10-CM

## 2024-12-03 DIAGNOSIS — F33.1 MDD (MAJOR DEPRESSIVE DISORDER), RECURRENT EPISODE, MODERATE: Primary | ICD-10-CM

## 2024-12-03 PROCEDURE — 99213 OFFICE O/P EST LOW 20 MIN: CPT | Performed by: ORTHOPAEDIC SURGERY

## 2024-12-03 PROCEDURE — 90832 PSYTX W PT 30 MINUTES: CPT

## 2024-12-03 PROCEDURE — 3048F LDL-C <100 MG/DL: CPT | Performed by: ORTHOPAEDIC SURGERY

## 2024-12-03 PROCEDURE — 3061F NEG MICROALBUMINURIA REV: CPT | Performed by: ORTHOPAEDIC SURGERY

## 2024-12-03 PROCEDURE — 3044F HG A1C LEVEL LT 7.0%: CPT | Performed by: ORTHOPAEDIC SURGERY

## 2024-12-03 PROCEDURE — 1159F MED LIST DOCD IN RCRD: CPT | Performed by: ORTHOPAEDIC SURGERY

## 2024-12-03 PROCEDURE — 3008F BODY MASS INDEX DOCD: CPT | Performed by: ORTHOPAEDIC SURGERY

## 2024-12-03 RX ORDER — ATORVASTATIN CALCIUM 40 MG/1
40 TABLET, FILM COATED ORAL NIGHTLY
Qty: 90 TABLET | Refills: 0 | Status: SHIPPED | OUTPATIENT
Start: 2024-12-03

## 2024-12-03 NOTE — LETTER
December 4, 2024     Marilee Hodges MD  [address]  [facility] [address]  [facility] [address]    Patient: Leslie Morelos   YOB: 1950   Date of Visit: 12/3/2024       Dear Dr. Marilee Hodges MD:    Thank you for referring Leslie Morelos to me for evaluation. Below are my notes for this consultation.  If you have questions, please do not hesitate to call me. I look forward to following your patient along with you.       Sincerely,     Sam Harrison MD      CC: No Recipients  ______________________________________________________________________________________    HPI:Sarita Morelos is a 74-year-old woman who comes in today with complaints of some activity related low back pain.  She denies any leg pain and/or specific claudication symptoms.  Past medical history is significant for prior L4-5 decompression and fusion.  Her overall quality of life is acceptable at this time.        ROS:  Reviewed on EMR and patient intake sheet.    PMH/SH:  Reviewed on EMR and patient intake sheet.    Exam:  Physical Exam    Constitutional: Well appearing; no acute distress  Eyes: pupils are equal and round  Psych: normal affect  Respiratory: non-labored breathing  Cardiovascular: regular rate and rhythm  GI: non-distended abdomen  Musculoskeletal: no pain with range of motion of the hips bilaterally  Neurologic: [4+]/5 strength in the lower extremities bilaterally]; [negative] straight leg raise    Radiology:     CT scan demonstrates stable L4-5 fusion.  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  At this time she has no  significant claudication or neurologic symptoms.  In the absence of those, I would encourage continued nonoperative management.  She was happy with that decision and will follow-up if she develops more neurologic symptoms in the future.    The patient was in agreement with the plan. At the end of the visit today, the patient felt that all questions had been answered satisfactorily.  The patient was pleased with the visit and very appreciative for the care rendered.     Thank you very much for the kind referral.  It is a privilege, and a pleasure, to partner with you in the care of your patients.  I would be delighted to assist you with any further consultations as needed.          Sam Harrison MD    Chief of Spine Surgery, [facility]  Director of Spine Service, [facility]  , Department of Orthopaedics  [facility] [employer] [facility]  [address] [place] [address]  P: [phone]  [facility]    This note was dictated with voice recognition software.  It has not been proofread for grammatical errors, typographical mistakes or other semantic inconsistencies.

## 2024-12-03 NOTE — PROGRESS NOTES
"Solution-Focused Therapy Follow up Note  Session Time: 11:02 AM -11:38 AM       Patient  Sarita Morelos \"Leslie\" is a 74 y.o. female, presenting for a follow-up visit.     An interactive audio and video telecommunication system which permits real time communications between the patient (at the originating site) and provider (at the distant site) was utilized to provide this telehealth service.     Verbal consent was requested and obtained from Sarita Morelos on this date, 12/03/24 for a telehealth visit.     Session conducted virtually via HIPAA compliant video.  Patient was provided with informed consent.    Chief Complaint   Patient presents with    MDD (Major Depressive Disorder)     Session #5    Updates/Session Content  Pt update:  Thanksgiving was quiet, but busy.  Pt cooked all by herself, which she hasn't done in about 15 years, so it took a lot out of her, but pt enjoyed having a quiet Thanksgiving with her immediate family--son and .  Other son and wife came in from [place] this past weekend.  Was good to see them, had a good time.      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  Doctor's appts, going well, but having some new back pain.  Sent her for CT scan, may suggest surgery, but pt not interested.    Have niece's dog for a while.  Has been to two baby showers, two birthday parties.  Has been to lunch a couple of times, [place] Party happening soon.  Has been content with how much she has been getting out--a lot more than ususal.  Also saw [employer], went to [employer].  Does most of shopping on [employer] nowadays, but has no hesitation about going out if needs to.    Pt feels like she is really doing well.  Feeling good, lost a few more pounds. Is learning to say \"no\" more often.         Discussion and homework:  Gave pt space to update.  Feels like she is doing well.  Discussed importance of pt continuing her own self-care.  Pt " planning to, will call if she needs an additional session or two.    Therapy components:  identifying key behaviors, activity monitoring, and identifying pleasant or meaningful activities  Monitored dysfunctional automatic thoughts by examining the evidence and weighing pros and cons or advantages and disadvantages.  Used problem-solving strategy.    Interventions Provided: Solution Focused Therapy and Develop Coping Strategies    Response to Intervention: Open, Engaged, and Receptive    Mental Status  General Appearance & Grooming: Appropriate  Behavior:  Appropriate  Mood: Normal  Sensorium/Cognition: No impairment and Alert & Oriented x 3    Additional Session Information  NA    Progress Made: Gaining Insight    Follow Up / Next Appointment:  Pt will call to schedule

## 2024-12-04 NOTE — PROGRESS NOTES
HPI:Sarita Morelos is a 74-year-old woman who comes in today with complaints of some activity related low back pain.  She denies any leg pain and/or specific claudication symptoms.  Past medical history is significant for prior L4-5 decompression and fusion.  Her overall quality of life is acceptable at this time.        ROS:  Reviewed on EMR and patient intake sheet.    PMH/SH:  Reviewed on EMR and patient intake sheet.    Exam:  Physical Exam    Constitutional: Well appearing; no acute distress  Eyes: pupils are equal and round  Psych: normal affect  Respiratory: non-labored breathing  Cardiovascular: regular rate and rhythm  GI: non-distended abdomen  Musculoskeletal: no pain with range of motion of the hips bilaterally  Neurologic: [4+]/5 strength in the lower extremities bilaterally]; [negative] straight leg raise    Radiology:     CT scan demonstrates stable L4-5 fusion.  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  At this time she has no significant claudication or neurologic symptoms.  In the absence of those, I would encourage continued nonoperative management.  She was happy with that decision and will follow-up if she develops more neurologic symptoms in the future.    The patient was in agreement with the plan. At the end of the visit today, the patient felt that all questions had been answered satisfactorily.  The patient was pleased with the visit and very appreciative for the care rendered.     Thank you very much for the kind referral.  It is a privilege, and a pleasure, to partner with you in the care of your patients.  I would  be delighted to assist you with any further consultations as needed.          Sam Harrison MD    Chief of Spine Surgery, [facility]  Director of Spine Service, [facility]  , Department of Orthopaedics  [facility] [employer] [facility]  [address] [place] [address]  P: [phone]  [facility]    This note was dictated with voice recognition software.  It has not been proofread for grammatical errors, typographical mistakes or other semantic inconsistencies.

## 2024-12-18 ENCOUNTER — APPOINTMENT (OUTPATIENT)
Dept: PHARMACY | Facility: HOSPITAL | Age: 74
End: 2024-12-18
Payer: MEDICARE

## 2024-12-18 DIAGNOSIS — E66.01 MORBID OBESITY (MULTI): ICD-10-CM

## 2024-12-18 NOTE — PROGRESS NOTES
"Pharmacist Clinic: Weight Management    Sarita Morelos \"Leslie\" was referred to the Clinical Pharmacy Team for weight management.     Referring Provider: Marilee Hodges, *  - Last visit with referring provider: 10/23/24    HISTORY OF PRESENT ILLNESS    - Patient with baseline BMI = 40.33 referred to assist with titration of GLP-1 agonist therapy for weight loss  - Patient has a history of type 2 diabetes, well controlled with last HbA1c 5.7  - Currently on Ozempic 1 mg weekly    Diet:   - regular, small portions   - Salad, [place], rice, Breakfast sandwich   - Save half meals for later   - Avoids snacks- chips and soda    Exercise Routine:   - not discussed    Weight loss:   - Lost 35 lbs  - Current weight: 212 (10/28/2024)  - Baseline weight: 247 lb (11/2023)    Adverse effects: None reported     PHARMACY ASSESSMENT    CURRENT WEIGHT LOSS PHARMACOTHERAPY  - Ozempic 1 mg weekly      AFFORDABILITY/ACCESSIBILITY  - No issues reported    DRUG INTERACTIONS  - No significant drug-drug interactions exist that require adjustment to therapy    Pertinent PMH Review: from chart review  - PMH of Pancreatitis: No  - PMH of Retinopathy: No  - PMH of MTC: No    Allergies: Gabapentin, Nitrofurantoin, and Sulfamethoxazole-trimethoprim     [id number] - [place], [place] - [address] [facility]  [address] [address]  Phone: [phone] Fax: [phone]    [facility] [place] - [facility] AT Portal to Registered [facility] Sites  [facility]  [address] [place] [address]  Phone: [phone] Fax: [phone]    [facility]  [address] [place] [address]  Phone: [phone] Fax: [phone]      LAB  Lab Results   Component Value Date    BILITOT 0.5 06/19/2024    CALCIUM 9.7 06/19/2024    CO2 26 06/19/2024     06/19/2024    CREATININE 0.95 06/19/2024    GLUCOSE 93 06/19/2024    ALKPHOS 140 (H) 06/19/2024    K 4.2 " 06/19/2024    PROT 8.0 06/19/2024     06/19/2024    AST 19 06/19/2024    ALT 18 06/19/2024    BUN 17 06/19/2024    ANIONGAP 14 06/19/2024    MG 1.80 08/01/2022    PHOS 2.5 07/25/2022    ALBUMIN 4.1 06/19/2024    LIPASE 14 09/12/2023    EGFR 64 06/19/2024     Lab Results   Component Value Date    TRIG 57 10/22/2024    CHOL 116 10/22/2024    LDLCALC 46 10/22/2024    HDL 58.2 10/22/2024     Lab Results   Component Value Date    HGBA1C 5.7 (H) 06/19/2024       Current Outpatient Medications on File Prior to Visit   Medication Sig Dispense Refill    amLODIPine (Norvasc) 10 mg tablet Take 1 tablet (10 mg) by mouth once daily. 90 tablet 0    atorvastatin (Lipitor) 40 mg tablet TAKE ONE TABLET BY MOUTH EVERY DAY AT BEDTIME 90 tablet 0    azelastine (Optivar) 0.05 % ophthalmic solution 1 drop both eyes up to 2 times a day as needed for itching/allergies 6 mL 11    cholecalciferol (Vitamin D-3) 25 MCG (1000 UT) tablet Take 1 tablet (25 mcg) by mouth once daily.      diclofenac sodium (Voltaren) 1 % gel gel APPLY 1 APPLICATION TOPICALLY 4 TIMES A DAY. 100 g 1    DULoxetine (Cymbalta) 60 mg DR capsule Take 2 capsules (120 mg) by mouth once daily. 180 capsule 1    estradiol (Vagifem) 10 mcg tablet vaginal tablet Place 1 tablet nightly for 2 weeks then 2 times a week thereafter. 24 tablet 4    ferrous sulfate 325 (65 Fe) MG tablet Take 1 tablet (325 mg) by mouth 1 (one) time per week.      metoprolol tartrate (Lopressor) 75 mg tablet Take 1 tablet (75 mg) by mouth once daily. 90 tablet 0    multivit with minerals/lutein (MULTIVITAMIN 50 PLUS ORAL) Take 1 tablet by mouth 3 times a week.      omeprazole (PriLOSEC) 20 mg DR capsule Take 1 capsule (20 mg) by mouth once daily as needed (as needed for reflux). 30 capsule 2    Ozempic 1 mg/dose (4 mg/3 mL) pen injector INJECT 1MG SUBCUTANEOUSLY  ONCE WEEKLY 9 mL 0    turmeric root extract 500 mg capsule Take 1 capsule by mouth once daily.      ubidecarenone (CO Q-10 ORAL) Take 1  tablet by mouth once daily.       No current facility-administered medications on file prior to visit.       PATIENT EDUCATION/GOALS  - Target goal 5% to 10% weight loss within 3-6 months  - Counseled patient on MOA, expectations, side effects, duration of therapy, contraindications, administration, and monitoring parameters  - Answered all patient questions and concerns; provided [facility] phone number if issues/questions arise    RECOMMENDATIONS/PLAN  Problem List Items Addressed This Visit       Morbid obesity (Multi)    Relevant Orders    Referral to Clinical Pharmacy       ASSESSMENT:  Patient with baseline BMI = 40.33 is on GLP-1 agonist therapy for type 2 diabetes treatment and weight loss.  Rationale for plan: Patient noticing appetite suppression and weight loss, and denies side effects.  Blood sugars well controlled.  Weigh loss has slowed, she did just receive 3 month supply of 1 mg, will plan to titrate dose at follow up.    PLAN:  Continue Ozempic 1 mg weekly, received another 3 month supply   Can evaluate for dose titration at follow up  Education provided:   Counseled patient on MOA, expectations, side effects, duration of therapy, contraindications, administration, and monitoring parameters  Answered all patient questions and concerns  Clinical Pharmacist follow up: 2/12/25 1200  PCP follow up: 1/28/25    Continue all meds under the continuation of care with the referring provider and clinical pharmacy team.    Annie Levy, PharmD  Clinical Pharmacy Specialist, Primary Care   [phone]    Verbal consent to manage patient's drug therapy was obtained from patient. They were informed they may decline to participate or withdraw from participation in pharmacy services at any time.

## 2025-01-14 ENCOUNTER — OFFICE VISIT (OUTPATIENT)
Dept: BEHAVIORAL HEALTH | Facility: CLINIC | Age: 75
End: 2025-01-14
Payer: MEDICARE

## 2025-01-14 VITALS
BODY MASS INDEX: 40.55 KG/M2 | TEMPERATURE: 97 F | WEIGHT: 214.6 LBS | HEART RATE: 74 BPM | RESPIRATION RATE: 19 BRPM | DIASTOLIC BLOOD PRESSURE: 76 MMHG | SYSTOLIC BLOOD PRESSURE: 122 MMHG

## 2025-01-14 DIAGNOSIS — F32.A DEPRESSION, UNSPECIFIED DEPRESSION TYPE: ICD-10-CM

## 2025-01-14 PROCEDURE — 3078F DIAST BP <80 MM HG: CPT | Performed by: PSYCHIATRY & NEUROLOGY

## 2025-01-14 PROCEDURE — 99214 OFFICE O/P EST MOD 30 MIN: CPT | Performed by: PSYCHIATRY & NEUROLOGY

## 2025-01-14 PROCEDURE — 3074F SYST BP LT 130 MM HG: CPT | Performed by: PSYCHIATRY & NEUROLOGY

## 2025-01-14 PROCEDURE — 99214 OFFICE O/P EST MOD 30 MIN: CPT | Mod: AM | Performed by: PSYCHIATRY & NEUROLOGY

## 2025-01-14 PROCEDURE — 1160F RVW MEDS BY RX/DR IN RCRD: CPT | Performed by: PSYCHIATRY & NEUROLOGY

## 2025-01-14 PROCEDURE — 1159F MED LIST DOCD IN RCRD: CPT | Performed by: PSYCHIATRY & NEUROLOGY

## 2025-01-14 RX ORDER — DULOXETIN HYDROCHLORIDE 60 MG/1
120 CAPSULE, DELAYED RELEASE ORAL DAILY
Qty: 180 CAPSULE | Refills: 1 | Status: SHIPPED | OUTPATIENT
Start: 2025-01-14 | End: 2025-07-13

## 2025-01-14 ASSESSMENT — ENCOUNTER SYMPTOMS
DEPRESSION: 0
OCCASIONAL FEELINGS OF UNSTEADINESS: 1
LOSS OF SENSATION IN FEET: 1

## 2025-01-14 NOTE — ASSESSMENT & PLAN NOTE
Orders:    DULoxetine (Cymbalta) 60 mg DR capsule; Take 2 capsules (120 mg) by mouth once daily.

## 2025-01-14 NOTE — PROGRESS NOTES
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 time spent on patient care activities:  minutes.   Documentation time: 5 minutes.   Total time on date of patient encounter: 30 minutes.

## 2025-01-27 ASSESSMENT — ENCOUNTER SYMPTOMS
NECK PAIN: 0
RHINORRHEA: 0
COUGH: 0
ABDOMINAL PAIN: 0
DIARRHEA: 0
VOMITING: 0
SORE THROAT: 0
HEADACHES: 1

## 2025-01-28 ENCOUNTER — APPOINTMENT (OUTPATIENT)
Dept: PRIMARY CARE | Facility: CLINIC | Age: 75
End: 2025-01-28
Payer: MEDICARE

## 2025-01-28 VITALS
BODY MASS INDEX: 39.65 KG/M2 | SYSTOLIC BLOOD PRESSURE: 120 MMHG | HEART RATE: 81 BPM | DIASTOLIC BLOOD PRESSURE: 75 MMHG | WEIGHT: 210 LBS | HEIGHT: 61 IN

## 2025-01-28 DIAGNOSIS — I10 PRIMARY HYPERTENSION: ICD-10-CM

## 2025-01-28 DIAGNOSIS — N39.41 URGE INCONTINENCE OF URINE: ICD-10-CM

## 2025-01-28 DIAGNOSIS — I10 HYPERTENSION, UNSPECIFIED TYPE: ICD-10-CM

## 2025-01-28 DIAGNOSIS — F33.1 MDD (MAJOR DEPRESSIVE DISORDER), RECURRENT EPISODE, MODERATE: Primary | ICD-10-CM

## 2025-01-28 DIAGNOSIS — E78.5 HYPERLIPIDEMIA, UNSPECIFIED HYPERLIPIDEMIA TYPE: ICD-10-CM

## 2025-01-28 DIAGNOSIS — E11.22 TYPE 2 DIABETES MELLITUS WITH CHRONIC KIDNEY DISEASE, WITHOUT LONG-TERM CURRENT USE OF INSULIN, UNSPECIFIED CKD STAGE (MULTI): ICD-10-CM

## 2025-01-28 DIAGNOSIS — E55.9 VITAMIN D DEFICIENCY: ICD-10-CM

## 2025-01-28 PROCEDURE — G2211 COMPLEX E/M VISIT ADD ON: HCPCS | Performed by: INTERNAL MEDICINE

## 2025-01-28 PROCEDURE — 3078F DIAST BP <80 MM HG: CPT | Performed by: INTERNAL MEDICINE

## 2025-01-28 PROCEDURE — 1036F TOBACCO NON-USER: CPT | Performed by: INTERNAL MEDICINE

## 2025-01-28 PROCEDURE — 3008F BODY MASS INDEX DOCD: CPT | Performed by: INTERNAL MEDICINE

## 2025-01-28 PROCEDURE — 3074F SYST BP LT 130 MM HG: CPT | Performed by: INTERNAL MEDICINE

## 2025-01-28 PROCEDURE — 99214 OFFICE O/P EST MOD 30 MIN: CPT | Performed by: INTERNAL MEDICINE

## 2025-01-28 RX ORDER — METOPROLOL TARTRATE 75 MG/1
75 TABLET ORAL DAILY
Qty: 90 TABLET | Refills: 1 | Status: SHIPPED | OUTPATIENT
Start: 2025-01-28

## 2025-01-28 RX ORDER — AMLODIPINE BESYLATE 10 MG/1
10 TABLET ORAL DAILY
Qty: 90 TABLET | Refills: 1 | Status: SHIPPED | OUTPATIENT
Start: 2025-01-28

## 2025-01-28 RX ORDER — METHOCARBAMOL 500 MG/1
500 TABLET, FILM COATED ORAL 4 TIMES DAILY PRN
Qty: 40 TABLET | Refills: 1 | Status: SHIPPED | OUTPATIENT
Start: 2025-01-28 | End: 2025-03-29

## 2025-01-28 ASSESSMENT — LIFESTYLE VARIABLES
HOW OFTEN DO YOU HAVE A DRINK CONTAINING ALCOHOL: NEVER
AUDIT-C TOTAL SCORE: 0
SKIP TO QUESTIONS 9-10: 1
HOW MANY STANDARD DRINKS CONTAINING ALCOHOL DO YOU HAVE ON A TYPICAL DAY: PATIENT DOES NOT DRINK
HOW OFTEN DO YOU HAVE SIX OR MORE DRINKS ON ONE OCCASION: NEVER

## 2025-01-28 NOTE — PROGRESS NOTES
"Subjective   Patient ID: Leslie Morelos is a 74 y.o. female who presents for Follow-up.  No acute complaints. Needed medication refill..    Objective   /75 (BP Location: Right arm, Patient Position: Sitting, BP Cuff Size: Large adult)   Pulse 81   Ht 1.549 m (5' 1\")   Wt 95.3 kg (210 lb)   LMP 01/01/2002 (Approximate)   BMI 39.68 kg/m²     Physical Exam  Constitutional: Appears stated age. In NAD.   HEENT: NC/AT, EOMI, clear sclera, moist mucous membranes  CV: RRR, No M/R/G  PULM: CTAB, no coughing or wheezing  ABDOMEN: Soft, NT/ND. No TTP. + BSx4.  SKIN: Normal Color, Warm, Dry, No Rashes   EXTREMITIES: Non-Tender, Full ROM, No Pedal Edema  NEURO: A&O x 4, nonfocal neurological exam.  PSYCH: Normal Mood & Behavior      Assessment/Plan   Problem List Items Addressed This Visit       Hypertension    Relevant Medications    amLODIPine (Norvasc) 10 mg tablet    metoprolol tartrate (Lopressor) 75 mg tablet    methocarbamol (Robaxin) 500 mg tablet    Other Relevant Orders    Referral to Physical Therapy    Renal function panel    Hemoglobin A1c    Albumin-Creatinine Ratio, Urine Random    Tsh With Reflex To Free T4 If Abnormal    Urinalysis with Reflex Microscopic    Vitamin B12    Referral to Physical Therapy    Renal function panel    Hemoglobin A1c    Albumin-Creatinine Ratio, Urine Random    Tsh With Reflex To Free T4 If Abnormal    Urinalysis with Reflex Microscopic    Vitamin B12    Urge incontinence of urine    Relevant Orders    Referral to Urology    Vitamin D deficiency    Relevant Medications    methocarbamol (Robaxin) 500 mg tablet    Other Relevant Orders    Referral to Physical Therapy    Renal function panel    Hemoglobin A1c    Albumin-Creatinine Ratio, Urine Random    Tsh With Reflex To Free T4 If Abnormal    Urinalysis with Reflex Microscopic    Vitamin B12    Type 2 diabetes mellitus with chronic kidney disease, without long-term current use of insulin, unspecified CKD stage (Multi) "    Relevant Medications    methocarbamol (Robaxin) 500 mg tablet    Other Relevant Orders    Referral to Physical Therapy    Renal function panel    Hemoglobin A1c    Albumin-Creatinine Ratio, Urine Random    Tsh With Reflex To Free T4 If Abnormal    Urinalysis with Reflex Microscopic    Vitamin B12    MDD (major depressive disorder), recurrent episode, moderate - Primary    Relevant Medications    methocarbamol (Robaxin) 500 mg tablet    Other Relevant Orders    Referral to Physical Therapy    Renal function panel    Hemoglobin A1c    Albumin-Creatinine Ratio, Urine Random    Tsh With Reflex To Free T4 If Abnormal    Urinalysis with Reflex Microscopic    Vitamin B12    Hyperlipidemia    Relevant Medications    methocarbamol (Robaxin) 500 mg tablet    Other Relevant Orders    Referral to Physical Therapy    Renal function panel    Hemoglobin A1c    Albumin-Creatinine Ratio, Urine Random    Tsh With Reflex To Free T4 If Abnormal    Urinalysis with Reflex Microscopic    Vitamin B12       Sarita JEANETTE Mary Morelos is a 73 y.o. female with a hx of HTN, dyslipidemia, obesity, gastric bypass surgery, cholecystectomy who presents for follow up and medication refill.     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 No acute osseous  abnormality. Varying degrees of degenerative change with at least moderate spinal  canal stenosis at L2-L3 and L3-L4 with potential severe spinal canal  stenosis at these levels. Moderate multilevel neural foraminal narrowing. Severe, relatively diffuse facet arthropathy.  - Diclofenac gel PRN and Robaxin PRN  - Patient has been doing physical therapy on her own  - Saw orthopedic surgeon who recommended continuing non operative management  - Referral for physical therapy provided    #Hx of cholecystectomy  #1.4cm CBD dilation on MRCP  - Asymptomatic, normal LFTs  - Will follow up with Dr. Patel in 3/2024 for biliary dilation     #Morbid  obesity  #Hx of gastric bypass surgery  - Increased Ozempic to 1mg weekly     #HTN  - Well controlled at home. 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 Next due in 10/2025.  -Colonoscopy 12/2023 showed One polyp in the transverse colon; Abnormal mucosa in the distal rectum and performed cold forceps biopsy for dysplasia screening; next colonoscopy due in 12/2025. Will provide requisition next visit.     Follow up in May 2025

## 2025-01-29 LAB
ALBUMIN SERPL-MCNC: 4.1 G/DL (ref 3.6–5.1)
ALBUMIN/CREAT UR: 75 MG/G CREAT
APPEARANCE UR: ABNORMAL
BACTERIA #/AREA URNS HPF: ABNORMAL /HPF
BILIRUB UR QL STRIP: NEGATIVE
BUN SERPL-MCNC: 19 MG/DL (ref 7–25)
BUN/CREAT SERPL: 18 (CALC) (ref 6–22)
CALCIUM SERPL-MCNC: 9.3 MG/DL (ref 8.6–10.4)
CAOX CRY #/AREA URNS HPF: ABNORMAL /HPF
CHLORIDE SERPL-SCNC: 105 MMOL/L (ref 98–110)
CO2 SERPL-SCNC: 28 MMOL/L (ref 20–32)
COLOR UR: ABNORMAL
CREAT SERPL-MCNC: 1.07 MG/DL (ref 0.6–1)
CREAT UR-MCNC: 165 MG/DL (ref 20–275)
EGFRCR SERPLBLD CKD-EPI 2021: 55 ML/MIN/1.73M2
EST. AVERAGE GLUCOSE BLD GHB EST-MCNC: 117 MG/DL
EST. AVERAGE GLUCOSE BLD GHB EST-SCNC: 6.5 MMOL/L
GLUCOSE SERPL-MCNC: 84 MG/DL (ref 65–99)
GLUCOSE UR QL STRIP: NEGATIVE
HBA1C MFR BLD: 5.7 % OF TOTAL HGB
HGB UR QL STRIP: ABNORMAL
HYALINE CASTS #/AREA URNS LPF: ABNORMAL /LPF
KETONES UR QL STRIP: NEGATIVE
LEUKOCYTE ESTERASE UR QL STRIP: ABNORMAL
MICROALBUMIN UR-MCNC: 12.4 MG/DL
NITRITE UR QL STRIP: NEGATIVE
PH UR STRIP: 5.5 [PH] (ref 5–8)
PHOSPHATE SERPL-MCNC: 3.6 MG/DL (ref 2.1–4.3)
POTASSIUM SERPL-SCNC: 4.3 MMOL/L (ref 3.5–5.3)
PROT UR QL STRIP: ABNORMAL
RBC #/AREA URNS HPF: ABNORMAL /HPF
SERVICE CMNT-IMP: ABNORMAL
SODIUM SERPL-SCNC: 141 MMOL/L (ref 135–146)
SP GR UR STRIP: 1.02 (ref 1–1.03)
SQUAMOUS #/AREA URNS HPF: ABNORMAL /HPF
TSH SERPL-ACNC: 1.36 MIU/L (ref 0.4–4.5)
WBC #/AREA URNS HPF: ABNORMAL /HPF

## 2025-01-29 NOTE — RESULT ENCOUNTER NOTE
Results reviewed.  There are some minor abnormalities, which are not concerning.  No changes in medications are needed at this time.  Please continue with current treatment.    Best,  Dr. Hunt

## 2025-01-30 ENCOUNTER — TELEPHONE (OUTPATIENT)
Dept: PRIMARY CARE | Facility: CLINIC | Age: 75
End: 2025-01-30
Payer: MEDICARE

## 2025-02-12 ENCOUNTER — APPOINTMENT (OUTPATIENT)
Dept: PHARMACY | Facility: HOSPITAL | Age: 75
End: 2025-02-12
Payer: MEDICARE

## 2025-02-12 DIAGNOSIS — E11.22 TYPE 2 DIABETES MELLITUS WITH CHRONIC KIDNEY DISEASE, WITHOUT LONG-TERM CURRENT USE OF INSULIN, UNSPECIFIED CKD STAGE (MULTI): ICD-10-CM

## 2025-02-12 DIAGNOSIS — E66.01 MORBID OBESITY (MULTI): Primary | ICD-10-CM

## 2025-02-12 NOTE — PROGRESS NOTES
"Pharmacist Clinic: Weight Management    Sarita Morelos \"Leslie\" was referred to the Clinical Pharmacy Team for weight management.     Referring Provider: Marilee Hodges, *  - Last visit with referring provider: 1/28/25    HISTORY OF PRESENT ILLNESS    - Patient with baseline BMI = 40.33 referred to assist with titration of GLP-1 agonist therapy for weight loss  - Patient has a history of type 2 diabetes, well controlled with last HbA1c 5.7  - Currently on Ozempic 1 mg weekly    Diet:   - regular, small portions   - Salad, [place], rice, Breakfast sandwich   - Save half meals for later   - Avoids snacks- chips and soda    Exercise Routine:   - not discussed    Weight loss:   - Lost 38 lbs  - Current weight: 209  - Baseline weight: 247 lb (11/2023)    Adverse effects: None reported     PHARMACY ASSESSMENT    CURRENT WEIGHT LOSS PHARMACOTHERAPY  - Ozempic 1 mg weekly      AFFORDABILITY/ACCESSIBILITY  - No issues reported    DRUG INTERACTIONS  - No significant drug-drug interactions exist that require adjustment to therapy    Pertinent PMH Review: from chart review  - PMH of Pancreatitis: No  - PMH of Retinopathy: No  - PMH of MTC: No    Allergies: Gabapentin, Nitrofurantoin, and Sulfamethoxazole-trimethoprim     [id number] - [place], [place] - [address] [place] [address]  [address] [address]  Phone: [phone] Fax: [phone]    [facility] AT Portal to Registered [facility] Sites  [facility]  [address] [place] [address]  Phone: [phone] Fax: [phone]    [facility]  [address] [place] [address], [place] [address]  Phone: [phone] Fax: [phone]      LAB  Lab Results   Component Value Date    BILITOT 0.5 06/19/2024    CALCIUM 9.3 01/28/2025    CO2 28 01/28/2025     01/28/2025    CREATININE 1.07 (H) 01/28/2025    GLUCOSE 84 01/28/2025    ALKPHOS 140 (H) 06/19/2024    K 4.3 01/28/2025    " PROT 8.0 06/19/2024     01/28/2025    AST 19 06/19/2024    ALT 18 06/19/2024    BUN 19 01/28/2025    ANIONGAP 14 06/19/2024    MG 1.80 08/01/2022    PHOS 3.6 01/28/2025    ALBUMIN 4.1 01/28/2025    LIPASE 14 09/12/2023    EGFR 55 (L) 01/28/2025     Lab Results   Component Value Date    TRIG 57 10/22/2024    CHOL 116 10/22/2024    LDLCALC 46 10/22/2024    HDL 58.2 10/22/2024     Lab Results   Component Value Date    HGBA1C 5.7 (H) 01/28/2025       Current Outpatient Medications on File Prior to Visit   Medication Sig Dispense Refill    amLODIPine (Norvasc) 10 mg tablet Take 1 tablet (10 mg) by mouth once daily. 90 tablet 1    atorvastatin (Lipitor) 40 mg tablet TAKE ONE TABLET BY MOUTH EVERY DAY AT BEDTIME 90 tablet 0    azelastine (Optivar) 0.05 % ophthalmic solution 1 drop both eyes up to 2 times a day as needed for itching/allergies 6 mL 11    cholecalciferol (Vitamin D-3) 25 MCG (1000 UT) tablet Take 1 tablet (25 mcg) by mouth once daily.      diclofenac sodium (Voltaren) 1 % gel gel APPLY 1 APPLICATION TOPICALLY 4 TIMES A DAY. 100 g 1    DULoxetine (Cymbalta) 60 mg DR capsule Take 2 capsules (120 mg) by mouth once daily. 180 capsule 1    estradiol (Vagifem) 10 mcg tablet vaginal tablet Place 1 tablet nightly for 2 weeks then 2 times a week thereafter. 24 tablet 4    ferrous sulfate 325 (65 Fe) MG tablet Take 1 tablet (325 mg) by mouth 1 (one) time per week.      methocarbamol (Robaxin) 500 mg tablet Take 1 tablet (500 mg) by mouth 4 times a day as needed for muscle spasms. 40 tablet 1    metoprolol tartrate (Lopressor) 75 mg tablet Take 1 tablet (75 mg) by mouth once daily. 90 tablet 1    multivit with minerals/lutein (MULTIVITAMIN 50 PLUS ORAL) Take 1 tablet by mouth 3 times a week.      omeprazole (PriLOSEC) 20 mg DR capsule Take 1 capsule (20 mg) by mouth once daily as needed (as needed for reflux). 30 capsule 2    turmeric root extract 500 mg capsule Take 1 capsule by mouth once daily.       ubidecarenone (CO Q-10 ORAL) Take 1 tablet by mouth once daily.      [DISCONTINUED] Ozempic 1 mg/dose (4 mg/3 mL) pen injector INJECT 1MG SUBCUTANEOUSLY  ONCE WEEKLY 9 mL 0     No current facility-administered medications on file prior to visit.       PATIENT EDUCATION/GOALS  - Target goal 5% to 10% weight loss within 3-6 months  - Counseled patient on MOA, expectations, side effects, duration of therapy, contraindications, administration, and monitoring parameters  - Answered all patient questions and concerns; provided [facility] phone number if issues/questions arise    RECOMMENDATIONS/PLAN  Problem List Items Addressed This Visit       Morbid obesity (Multi) - Primary    Relevant Medications    semaglutide 2 mg/dose (8 mg/3 mL) pen injector    Other Relevant Orders    Referral to Clinical Pharmacy    Type 2 diabetes mellitus with chronic kidney disease, without long-term current use of insulin, unspecified CKD stage (Multi)    Relevant Medications    semaglutide 2 mg/dose (8 mg/3 mL) pen injector    Other Relevant Orders    Referral to Clinical Pharmacy       ASSESSMENT:  Patient with baseline BMI = 40.33 is on GLP-1 agonist therapy for type 2 diabetes treatment and weight loss.  Rationale for plan: Patient noticing appetite suppression and weight loss, and denies side effects.  Blood sugars well controlled.  Weight loss has plateaued and she would like to increase to next dose at this time.    PLAN:  INCREASE Ozempic 2 mg weekly, received another 3 month supply   Send prescription to [facility] mail order per patient request 3 month supply  Education provided:   Counseled patient on MOA, expectations, side effects, duration of therapy, contraindications, administration, and monitoring parameters  Answered all patient questions and concerns  Clinical Pharmacist follow up: 3/26/25 [facility]  PCP follow up: 5/27/25    Continue all meds under the continuation of care with the referring provider and clinical pharmacy team.    Annie GRIMALDO  Jazlyn Levy  Clinical Pharmacy Specialist, Primary Care   [phone]    Verbal consent to manage patient's drug therapy was obtained from patient. They were informed they may decline to participate or withdraw from participation in pharmacy services at any time.

## 2025-03-11 ENCOUNTER — TELEPHONE (OUTPATIENT)
Dept: OBSTETRICS AND GYNECOLOGY | Facility: HOSPITAL | Age: 75
End: 2025-03-11
Payer: MEDICARE

## 2025-03-11 DIAGNOSIS — R39.9 URINARY SYMPTOM OR SIGN: Primary | ICD-10-CM

## 2025-03-11 DIAGNOSIS — R30.0 DYSURIA: ICD-10-CM

## 2025-03-11 NOTE — TELEPHONE ENCOUNTER
Called patient to discuss needs for upcoming appt. Discussed that I am happy to help with renal stone diagnosis but I do not do surgical treatment of renal stones. She discussed that she is passing stones currently, but more concerned about her UTIs. I put in for urinalysis and urine culture and she will drop off a sample at the lab. Pt verbalized understanding and all questions answered.    Hanna Vallejo MD, Gynecologist  Female Reconstruction & Sexual Medicine Fellow  Dept of Urology/OBGYN  3/11/2025

## 2025-03-14 ENCOUNTER — APPOINTMENT (OUTPATIENT)
Dept: UROLOGY | Facility: CLINIC | Age: 75
End: 2025-03-14
Payer: MEDICARE

## 2025-03-26 ENCOUNTER — TELEMEDICINE (OUTPATIENT)
Dept: PHARMACY | Facility: HOSPITAL | Age: 75
End: 2025-03-26
Payer: MEDICARE

## 2025-03-26 ENCOUNTER — APPOINTMENT (OUTPATIENT)
Dept: PHARMACY | Facility: HOSPITAL | Age: 75
End: 2025-03-26
Payer: MEDICARE

## 2025-03-26 DIAGNOSIS — E11.22 TYPE 2 DIABETES MELLITUS WITH CHRONIC KIDNEY DISEASE, WITHOUT LONG-TERM CURRENT USE OF INSULIN, UNSPECIFIED CKD STAGE (MULTI): ICD-10-CM

## 2025-03-26 DIAGNOSIS — E66.01 MORBID OBESITY (MULTI): ICD-10-CM

## 2025-03-26 NOTE — PROGRESS NOTES
"Pharmacist Clinic: Weight Management    Sarita Morelos \"Leslie\" was referred to the Clinical Pharmacy Team for weight management.     Referring Provider: Marilee Hodges, *  - Last visit with referring provider: 1/28/25    HISTORY OF PRESENT ILLNESS    - Patient with baseline BMI = 40.33 referred to assist with titration of GLP-1 agonist therapy for weight loss  - Patient has a history of type 2 diabetes, well controlled with last HbA1c 5.7  - Recent dose increase to Ozempic 2 mg weekly for additional weight loss support  - Does report some dietary indiscretion the past few weeks but she is noticing appetite suppression       Diet:   - regular, small portions   - Salad, [place], rice, Breakfast sandwich   - Save half meals for later   - Avoids snacks- chips and soda    Exercise Routine:   - is wearing back support     Weight loss:   - Lost 40 lbs  - Current weight: 207  - Baseline weight: 247 lb (11/2023)  - Goal <200 lb    Adverse effects: None reported     PHARMACY ASSESSMENT    CURRENT WEIGHT LOSS PHARMACOTHERAPY  - Ozempic 2 mg weekly      AFFORDABILITY/ACCESSIBILITY  - No issues reported    DRUG INTERACTIONS  - No significant drug-drug interactions exist that require adjustment to therapy    Pertinent PMH Review: from chart review  - PMH of Pancreatitis: No  - PMH of Retinopathy: No  - PMH of MTC: No    Allergies: Gabapentin, Nitrofurantoin, and Sulfamethoxazole-trimethoprim     [id number] - [place], [place] - [address] [place] [address]  [address] [address]  Phone: [phone] Fax: [phone]    [facility] - [facility] AT Portal to [facility] Sites  [facility]  [address] [address]  Phone: [phone] Fax: [phone]    [facility]  [address] [facility] [address]  [place] [address]  Phone: [phone] Fax: [phone]      LAB  Lab Results   Component Value Date    BILITOT 0.5 06/19/2024    " CALCIUM 9.3 01/28/2025    CO2 28 01/28/2025     01/28/2025    CREATININE 1.07 (H) 01/28/2025    GLUCOSE 84 01/28/2025    ALKPHOS 140 (H) 06/19/2024    K 4.3 01/28/2025    PROT 8.0 06/19/2024     01/28/2025    AST 19 06/19/2024    ALT 18 06/19/2024    BUN 19 01/28/2025    ANIONGAP 14 06/19/2024    MG 1.80 08/01/2022    PHOS 3.6 01/28/2025    ALBUMIN 4.1 01/28/2025    LIPASE 14 09/12/2023    EGFR 55 (L) 01/28/2025     Lab Results   Component Value Date    TRIG 57 10/22/2024    CHOL 116 10/22/2024    LDLCALC 46 10/22/2024    HDL 58.2 10/22/2024     Lab Results   Component Value Date    HGBA1C 5.7 (H) 01/28/2025       Current Outpatient Medications on File Prior to Visit   Medication Sig Dispense Refill    amLODIPine (Norvasc) 10 mg tablet Take 1 tablet (10 mg) by mouth once daily. 90 tablet 1    atorvastatin (Lipitor) 40 mg tablet TAKE ONE TABLET BY MOUTH EVERY DAY AT BEDTIME 90 tablet 0    azelastine (Optivar) 0.05 % ophthalmic solution 1 drop both eyes up to 2 times a day as needed for itching/allergies 6 mL 11    cholecalciferol (Vitamin D-3) 25 MCG (1000 UT) tablet Take 1 tablet (25 mcg) by mouth once daily.      diclofenac sodium (Voltaren) 1 % gel gel APPLY 1 APPLICATION TOPICALLY 4 TIMES A DAY. 100 g 1    DULoxetine (Cymbalta) 60 mg DR capsule Take 2 capsules (120 mg) by mouth once daily. 180 capsule 1    estradiol (Vagifem) 10 mcg tablet vaginal tablet Place 1 tablet nightly for 2 weeks then 2 times a week thereafter. 24 tablet 4    ferrous sulfate 325 (65 Fe) MG tablet Take 1 tablet (325 mg) by mouth 1 (one) time per week.      methocarbamol (Robaxin) 500 mg tablet Take 1 tablet (500 mg) by mouth 4 times a day as needed for muscle spasms. 40 tablet 1    metoprolol tartrate (Lopressor) 75 mg tablet Take 1 tablet (75 mg) by mouth once daily. 90 tablet 1    multivit with minerals/lutein (MULTIVITAMIN 50 PLUS ORAL) Take 1 tablet by mouth 3 times a week.      omeprazole (PriLOSEC) 20 mg DR capsule Take  1 capsule (20 mg) by mouth once daily as needed (as needed for reflux). 30 capsule 2    semaglutide 2 mg/dose (8 mg/3 mL) pen injector Inject 2 mg under the skin 1 (one) time per week. 9 mL 1    turmeric root extract 500 mg capsule Take 1 capsule by mouth once daily.      ubidecarenone (CO Q-10 ORAL) Take 1 tablet by mouth once daily.       No current facility-administered medications on file prior to visit.       PATIENT EDUCATION/GOALS  - Target goal 5% to 10% weight loss within 3-6 months  - Counseled patient on MOA, expectations, side effects, duration of therapy, contraindications, administration, and monitoring parameters  - Answered all patient questions and concerns; provided [facility] phone number if issues/questions arise    RECOMMENDATIONS/PLAN  Problem List Items Addressed This Visit       Morbid obesity (Multi)    Relevant Orders    Referral to Clinical Pharmacy    Type 2 diabetes mellitus with chronic kidney disease, without long-term current use of insulin, unspecified CKD stage (Multi)    Relevant Orders    Referral to Clinical Pharmacy       ASSESSMENT:  Patient with baseline BMI = 40.33 is on GLP-1 agonist therapy for type 2 diabetes treatment and weight loss.  Rationale for plan: Patient denies any side effects going up to Ozempic 2 mg.  Blood sugars well controlled.  She is noticing additional appetite suppression since dose increase but she has noted weight loss on the scale.     PLAN:  CONTINUE Ozempic 2 mg weekly,   No refill needed at this time   Education provided:   Discussed importance of maintaining adequate nutrition and hydration throughout the day to support weight management and provide fuel and energy for the body  Goal to eat small meals throughout the day and will incorporate protein shakes  Counseled patient on MOA, expectations, side effects, duration of therapy, contraindications, administration, and monitoring parameters  Answered all patient questions and concerns  Clinical  Pharmacist follow up: 4/29/25 1140 am  PCP follow up: 5/27/25    Continue all meds under the continuation of care with the referring provider and clinical pharmacy team.    Annie Levy, PharmD  Clinical Pharmacy Specialist, Primary Care   [phone]    Verbal consent to manage patient's drug therapy was obtained from patient. They were informed they may decline to participate or withdraw from participation in pharmacy services at any time.

## 2025-04-02 ENCOUNTER — APPOINTMENT (OUTPATIENT)
Dept: PODIATRY | Facility: CLINIC | Age: 75
End: 2025-04-02
Payer: MEDICARE

## 2025-04-03 NOTE — PROGRESS NOTES
"History Of Present Illness  Sarita Morelos \"Leslie\" is a 75 y.o. female presenting with recurrent UTI symptoms despite vaginal estrogen, D mannose, cranberry in the context of postmenopausal status and diabetes. Also has hx of OAB; failed Interstim which was subsequently explanted and has had bladder botox in the past (failed oxybutynin and Myrbetriq) but previously unable to obtain due to UTI, has not tried PTNS yet. Gross blood in urine 1 month ago. Urinary frequency but no dysuria. Reports longstanding UTI symptoms that she has not sought treatment for before today.     No recent cultures in past year for reference    Renal function: GFR 55 on 1/28/25  A1C: 5.7 on 1/28/25  Renal stone hx: YES, due for repeat imaging  PVR today: 0  Antibiotic allergies: macrobid, bactrim       Past Medical History  She has a past medical history of Abnormal weight gain (03/15/2018), Anemia, Arrhythmia, Arthritis, Body mass index (BMI) 45.0-49.9, adult (Multi) (07/06/2022), Body mass index (BMI)40.0-44.9, adult (03/03/2021), Cataract, Cervical myelopathy, Chronic lumbar radiculopathy, CKD (chronic kidney disease), Conjunctival pigmentations, bilateral (10/03/2022), Depression, Difficulty walking (2005), Dry eye syndrome of bilateral lacrimal glands (10/03/2022), Dry eyes (2019), Gastro-esophageal reflux disease without esophagitis (03/15/2018), Gross hematuria (06/05/2020), Head injury (10/31/2023), Heart murmur, Hiatal hernia, HLD (hyperlipidemia), HTN (hypertension), Iron deficiency anemia, unspecified (04/08/2022), Kidney stones, Morbid (severe) obesity due to excess calories (Multi) (07/06/2022), Myopia, bilateral (10/03/2022), Nephrolithiasis, OA (osteoarthritis), OAB (overactive bladder), Osteopenia, Osteoporosis, Other conditions influencing health status, Other conditions influencing health status, Other conditions influencing health status (05/22/2015), Pain in left shoulder (03/19/2016), Pain in unspecified " shoulder (07/15/2015), Personal history of other diseases of the nervous system and sense organs (07/30/2014), Personal history of other drug therapy (05/23/2018), Personal history of other specified conditions (04/25/2014), Polyp of colon (05/23/2018), Prediabetes (07/23/2015), Prediabetes (10/03/2022), Spinal stenosis, Spondylolisthesis, Thyrotoxicosis with diffuse goiter without thyrotoxic crisis or storm (03/15/2018), Thyrotoxicosis, unspecified without thyrotoxic crisis or storm (03/15/2018), Type 2 diabetes mellitus without complications (09/29/2015), Unspecified astigmatism, unspecified eye (03/15/2018), Unspecified retinoschisis, right eye (05/21/2018), Unspecified visual loss (04/25/2014), and Vitamin D deficiency.    She has no past medical history of Vision loss.    Surgical History  She has a past surgical history that includes Lithotripsy (02/07/2014); Total knee arthroplasty (10/04/2018); Cervical discectomy (07/01/2015); Gastric bypass (08/22/2016); Colonoscopy (05/31/2018); Appendectomy (11/15/2017); Carpal tunnel release (03/29/2013); Cataract extraction w/  intraocular lens implant (Right, 03/01/2018); Cholecystectomy; Lumbar laminectomy; Bladder suspension; Cystocele repair; Other surgical history (10/2021); Breast biopsy; Colon surgery (07.21.2022); Back surgery; [place] tooth extraction; Intraocular lens insertion; and Cataract extraction w/  intraocular lens implant (Left, 04/05/2018).     Social History  She reports that she has never smoked. She has never been exposed to tobacco smoke. She has never used smokeless tobacco. She reports that she does not drink alcohol and does not use drugs.    Family History  Family History   Problem Relation Name Age of Onset    Breast cancer Mother Sharon     Arthritis Mother Sharon     Cancer Mother Sharon     Hypertension Mother Sharon     No Known Problems Father      No Known Problems Sister      Arthritis Sister Janet     Hypertension Sister  "Janet     Hypertension Sister Janet     Anxiety disorder Sister Fanta     No Known Problems Brother      Hypertension Brother Ronnie     Hypertension Brother Ronnie     Diabetes Maternal Grandmother Georgia Bannock     Breast cancer Maternal Grandmother Georgia Bannock     Macular degeneration Maternal Grandmother Georgia Bannock     Breast cancer Maternal Grandfather Gay     Diabetes Paternal Grandmother      Glaucoma Neg Hx          Allergies  Gabapentin, Nitrofurantoin, and Sulfamethoxazole-trimethoprim    Review of Systems   Constitutional:  Negative for chills and fever.   All other systems reviewed and are negative.       Physical Exam  Con: awake, alert, NAD  HEENT: normocephalic, speech normal  CV: no peripheral edema  Resp: no increased work of breathing  Neuro: normal mentation  Psych: mood normal  Skin: no rash  : No CVA tenderness bilaterally     Last Recorded Vitals  Temperature 36.2 °C (97.2 °F), height 1.549 m (5' 1\"), weight 94.8 kg (209 lb), last menstrual period 01/01/2002.    Relevant Results    Results for orders placed or performed in visit on 04/04/25 (from the past 24 hours)   POCT UA Automated manually resulted   Result Value Ref Range    POC Color, Urine Yellow Straw, Yellow, Light-Yellow    POC Appearance, Urine Clear Clear    POC Glucose, Urine NEGATIVE NEGATIVE mg/dl    POC Bilirubin, Urine NEGATIVE NEGATIVE    POC Ketones, Urine NEGATIVE NEGATIVE mg/dl    POC Specific Gravity, Urine 1.015 1.005 - 1.035    POC Blood, Urine MODERATE (2+) (A) NEGATIVE    POC PH, Urine 6.0 No Reference Range Established PH    POC Protein, Urine 100 (2+) (A) NEGATIVE mg/dl    POC Urobilinogen, Urine 0.2 0.2, 1.0 EU/DL    Poc Nitrite, Urine POSITIVE (A) NEGATIVE    POC Leukocytes, Urine LARGE (3+) (A) NEGATIVE     *Note: Due to a large number of results and/or encounters for the requested time period, some results have not been displayed. A complete set of results can be found in Results Review.     Imaging  Hx 8mm " renal stone       Assessment/Plan     Acute UTI  Fosfomycin prescribed  No evidence of pyelo based on physical exam    UTI prevention  Vaginal estrogen  If 1 more UTI within 6 months start Hiprex as long as GFR remains above 30     Gross hematuria  CT urogram  Return for cystoscopy    Renal stones  CT urogram will reevaluate    Urge incontinence  Follow up with Didi Carrera for PTNS     Hanna Vallejo MD, Gynecologist  Female Reconstruction & Sexual Medicine Fellow  Dept of Urology/OBGYN

## 2025-04-04 ENCOUNTER — APPOINTMENT (OUTPATIENT)
Dept: UROLOGY | Facility: CLINIC | Age: 75
End: 2025-04-04
Payer: COMMERCIAL

## 2025-04-04 VITALS — HEIGHT: 61 IN | WEIGHT: 209 LBS | TEMPERATURE: 97.2 F | BODY MASS INDEX: 39.46 KG/M2

## 2025-04-04 DIAGNOSIS — N39.0 ACUTE UTI: Primary | ICD-10-CM

## 2025-04-04 DIAGNOSIS — N39.41 URGE INCONTINENCE OF URINE: ICD-10-CM

## 2025-04-04 DIAGNOSIS — N39.0 RECURRENT UTI: ICD-10-CM

## 2025-04-04 DIAGNOSIS — N20.0 RENAL STONES: ICD-10-CM

## 2025-04-04 DIAGNOSIS — R31.0 GROSS HEMATURIA: ICD-10-CM

## 2025-04-04 LAB
POC APPEARANCE, URINE: CLEAR
POC BILIRUBIN, URINE: NEGATIVE
POC BLOOD, URINE: ABNORMAL
POC COLOR, URINE: YELLOW
POC GLUCOSE, URINE: NEGATIVE MG/DL
POC KETONES, URINE: NEGATIVE MG/DL
POC LEUKOCYTES, URINE: ABNORMAL
POC NITRITE,URINE: POSITIVE
POC PH, URINE: 6 PH
POC PROTEIN, URINE: ABNORMAL MG/DL
POC SPECIFIC GRAVITY, URINE: 1.01
POC UROBILINOGEN, URINE: 0.2 EU/DL

## 2025-04-04 PROCEDURE — 81003 URINALYSIS AUTO W/O SCOPE: CPT | Performed by: STUDENT IN AN ORGANIZED HEALTH CARE EDUCATION/TRAINING PROGRAM

## 2025-04-04 PROCEDURE — 3008F BODY MASS INDEX DOCD: CPT | Performed by: STUDENT IN AN ORGANIZED HEALTH CARE EDUCATION/TRAINING PROGRAM

## 2025-04-04 PROCEDURE — 1036F TOBACCO NON-USER: CPT | Performed by: STUDENT IN AN ORGANIZED HEALTH CARE EDUCATION/TRAINING PROGRAM

## 2025-04-04 PROCEDURE — G2211 COMPLEX E/M VISIT ADD ON: HCPCS | Performed by: STUDENT IN AN ORGANIZED HEALTH CARE EDUCATION/TRAINING PROGRAM

## 2025-04-04 PROCEDURE — 1126F AMNT PAIN NOTED NONE PRSNT: CPT | Performed by: STUDENT IN AN ORGANIZED HEALTH CARE EDUCATION/TRAINING PROGRAM

## 2025-04-04 PROCEDURE — 1159F MED LIST DOCD IN RCRD: CPT | Performed by: STUDENT IN AN ORGANIZED HEALTH CARE EDUCATION/TRAINING PROGRAM

## 2025-04-04 PROCEDURE — 99214 OFFICE O/P EST MOD 30 MIN: CPT | Performed by: STUDENT IN AN ORGANIZED HEALTH CARE EDUCATION/TRAINING PROGRAM

## 2025-04-04 RX ORDER — GRANULES FOR ORAL 3 G/1
3 POWDER ORAL ONCE
Qty: 3 G | Refills: 0 | Status: SHIPPED | OUTPATIENT
Start: 2025-04-04 | End: 2025-04-04

## 2025-04-04 ASSESSMENT — PAIN SCALES - GENERAL: PAINLEVEL_OUTOF10: 0-NO PAIN

## 2025-04-04 ASSESSMENT — ENCOUNTER SYMPTOMS
CHILLS: 0
FEVER: 0

## 2025-04-06 LAB
BACTERIA #/AREA URNS HPF: ABNORMAL /HPF
BACTERIA UR CULT: ABNORMAL
CAOX CRY #/AREA URNS HPF: ABNORMAL /HPF
HYALINE CASTS #/AREA URNS LPF: ABNORMAL /LPF
RBC #/AREA URNS HPF: ABNORMAL /HPF
SERVICE CMNT-IMP: ABNORMAL
SQUAMOUS #/AREA URNS HPF: ABNORMAL /HPF
WBC #/AREA URNS HPF: ABNORMAL /HPF

## 2025-04-07 ENCOUNTER — TELEPHONE (OUTPATIENT)
Dept: UROLOGY | Facility: CLINIC | Age: 75
End: 2025-04-07
Payer: MEDICARE

## 2025-04-07 DIAGNOSIS — N28.9 ABNORMAL RENAL FUNCTION FINDING: Primary | ICD-10-CM

## 2025-04-08 ENCOUNTER — APPOINTMENT (OUTPATIENT)
Dept: RADIOLOGY | Facility: CLINIC | Age: 75
End: 2025-04-08
Payer: MEDICARE

## 2025-04-09 ENCOUNTER — LAB REQUISITION (OUTPATIENT)
Dept: LAB | Facility: HOSPITAL | Age: 75
End: 2025-04-09
Payer: MEDICARE

## 2025-04-09 DIAGNOSIS — N28.9 DISORDER OF KIDNEY AND URETER, UNSPECIFIED: ICD-10-CM

## 2025-04-09 LAB
CREAT SERPL-MCNC: 0.92 MG/DL (ref 0.5–1.05)
EGFRCR SERPLBLD CKD-EPI 2021: 66 ML/MIN/1.73M*2

## 2025-04-09 PROCEDURE — 82565 ASSAY OF CREATININE: CPT

## 2025-04-11 ENCOUNTER — HOSPITAL ENCOUNTER (OUTPATIENT)
Dept: RADIOLOGY | Facility: CLINIC | Age: 75
Discharge: HOME | End: 2025-04-11
Payer: MEDICARE

## 2025-04-11 DIAGNOSIS — R31.0 GROSS HEMATURIA: ICD-10-CM

## 2025-04-11 PROCEDURE — 2550000001 HC RX 255 CONTRASTS: Performed by: STUDENT IN AN ORGANIZED HEALTH CARE EDUCATION/TRAINING PROGRAM

## 2025-04-11 PROCEDURE — 76377 3D RENDER W/INTRP POSTPROCES: CPT

## 2025-04-11 RX ADMIN — IOHEXOL 75 ML: 350 INJECTION, SOLUTION INTRAVENOUS at 15:30

## 2025-04-15 DIAGNOSIS — E78.5 DYSLIPIDEMIA: ICD-10-CM

## 2025-04-16 RX ORDER — ATORVASTATIN CALCIUM 40 MG/1
40 TABLET, FILM COATED ORAL NIGHTLY
Qty: 90 TABLET | Refills: 0 | Status: SHIPPED | OUTPATIENT
Start: 2025-04-16

## 2025-04-24 DIAGNOSIS — N30.00 ACUTE CYSTITIS WITHOUT HEMATURIA: Primary | ICD-10-CM

## 2025-04-24 RX ORDER — AMOXICILLIN AND CLAVULANATE POTASSIUM 875; 125 MG/1; MG/1
875 TABLET, FILM COATED ORAL 2 TIMES DAILY
Qty: 14 TABLET | Refills: 0 | Status: SHIPPED | OUTPATIENT
Start: 2025-04-24 | End: 2025-05-01

## 2025-04-29 ENCOUNTER — APPOINTMENT (OUTPATIENT)
Dept: PHARMACY | Facility: HOSPITAL | Age: 75
End: 2025-04-29
Payer: MEDICARE

## 2025-04-29 DIAGNOSIS — E11.22 TYPE 2 DIABETES MELLITUS WITH CHRONIC KIDNEY DISEASE, WITHOUT LONG-TERM CURRENT USE OF INSULIN, UNSPECIFIED CKD STAGE (MULTI): ICD-10-CM

## 2025-04-29 DIAGNOSIS — E66.01 MORBID OBESITY (MULTI): ICD-10-CM

## 2025-04-29 NOTE — PROGRESS NOTES
"Pharmacist Clinic: Weight Management    Sarita Morelos \"Leslie\" was referred to the Clinical Pharmacy Team for weight management.     Referring Provider: Marilee Hodges, *  - Last visit with referring provider: 1/28/25    HISTORY OF PRESENT ILLNESS    - Patient with baseline BMI = 40.33 referred to assist with titration of GLP-1 agonist therapy for weight loss  - Patient has a history of type 2 diabetes, well controlled with last HbA1c 5.7  - Recent dose increase to Ozempic 2 mg weekly for additional weight loss support  - Noticing moderate appetite suppression but not much change in weight       Diet:   - regular, small portions   - Salad, [place], rice, Breakfast sandwich   - Save half meals for later   - Avoids snacks- chips and soda    Exercise Routine:   - is wearing back support     Weight loss:   - Lost 40 lbs  - Current weight: 204-206  - Baseline weight: 247 lb (11/2023)  - Goal <200 lb    Adverse effects: None reported     PHARMACY ASSESSMENT    CURRENT WEIGHT LOSS PHARMACOTHERAPY  - Ozempic 2 mg weekly      AFFORDABILITY/ACCESSIBILITY  - No issues reported    DRUG INTERACTIONS  - No significant drug-drug interactions exist that require adjustment to therapy    Pertinent PMH Review: from chart review  - PMH of Pancreatitis: No  - PMH of Retinopathy: No  - PMH of MTC: No    Allergies: Gabapentin, Nitrofurantoin, and Sulfamethoxazole-trimethoprim     [id number] - [place], [place] - [address]  [address] [address]  Phone: [phone] Fax: [phone]    [facility] [place] - [facility] AT Portal to Registered [facility] Sites  [facility]  [place] [address]  Phone: [phone] Fax: [phone]    [facility]  [address] [facility] [address] [address]  Phone: [phone] Fax: [phone]      LAB  Lab Results   Component Value Date    BILITOT 0.5 06/19/2024    CALCIUM 9.3 01/28/2025    " CO2 28 01/28/2025     01/28/2025    CREATININE 0.92 04/09/2025    GLUCOSE 84 01/28/2025    ALKPHOS 140 (H) 06/19/2024    K 4.3 01/28/2025    PROT 8.0 06/19/2024     01/28/2025    AST 19 06/19/2024    ALT 18 06/19/2024    BUN 19 01/28/2025    ANIONGAP 14 06/19/2024    MG 1.80 08/01/2022    PHOS 3.6 01/28/2025    ALBUMIN 4.1 01/28/2025    LIPASE 14 09/12/2023    EGFR 66 04/09/2025     Lab Results   Component Value Date    TRIG 57 10/22/2024    CHOL 116 10/22/2024    LDLCALC 46 10/22/2024    HDL 58.2 10/22/2024     Lab Results   Component Value Date    HGBA1C 5.7 (H) 01/28/2025       Current Outpatient Medications on File Prior to Visit   Medication Sig Dispense Refill    amLODIPine (Norvasc) 10 mg tablet Take 1 tablet (10 mg) by mouth once daily. 90 tablet 1    amoxicillin-clavulanate (Augmentin) 875-125 mg tablet Take 1 tablet (875 mg) by mouth 2 times a day for 7 days. 14 tablet 0    atorvastatin (Lipitor) 40 mg tablet TAKE ONE TABLET BY MOUTH EVERY DAY AT BEDTIME 90 tablet 0    azelastine (Optivar) 0.05 % ophthalmic solution 1 drop both eyes up to 2 times a day as needed for itching/allergies 6 mL 11    cholecalciferol (Vitamin D-3) 25 MCG (1000 UT) tablet Take 1 tablet (25 mcg) by mouth once daily.      diclofenac sodium (Voltaren) 1 % gel gel APPLY 1 APPLICATION TOPICALLY 4 TIMES A DAY. 100 g 1    DULoxetine (Cymbalta) 60 mg DR capsule Take 2 capsules (120 mg) by mouth once daily. 180 capsule 1    estradiol (Vagifem) 10 mcg tablet vaginal tablet Place 1 tablet nightly for 2 weeks then 2 times a week thereafter. 24 tablet 4    ferrous sulfate 325 (65 Fe) MG tablet Take 1 tablet (325 mg) by mouth 1 (one) time per week.      methocarbamol (Robaxin) 500 mg tablet Take 1 tablet (500 mg) by mouth 4 times a day as needed for muscle spasms. 40 tablet 1    metoprolol tartrate (Lopressor) 75 mg tablet Take 1 tablet (75 mg) by mouth once daily. 90 tablet 1    multivit with minerals/lutein (MULTIVITAMIN 50 PLUS  ORAL) Take 1 tablet by mouth 3 times a week.      omeprazole (PriLOSEC) 20 mg DR capsule Take 1 capsule (20 mg) by mouth once daily as needed (as needed for reflux). 30 capsule 2    semaglutide 2 mg/dose (8 mg/3 mL) pen injector Inject 2 mg under the skin 1 (one) time per week. 9 mL 1    turmeric root extract 500 mg capsule Take 1 capsule by mouth once daily.      ubidecarenone (CO Q-10 ORAL) Take 1 tablet by mouth once daily.       No current facility-administered medications on file prior to visit.       PATIENT EDUCATION/GOALS  - Target goal 5% to 10% weight loss within 3-6 months  - Counseled patient on MOA, expectations, side effects, duration of therapy, contraindications, administration, and monitoring parameters  - Answered all patient questions and concerns; provided [facility] phone number if issues/questions arise    RECOMMENDATIONS/PLAN  Problem List Items Addressed This Visit       Morbid obesity (Multi)    Relevant Orders    Referral to Clinical Pharmacy    Type 2 diabetes mellitus with chronic kidney disease, without long-term current use of insulin, unspecified CKD stage (Multi)    Relevant Orders    Referral to Clinical Pharmacy       ASSESSMENT:  Patient with baseline BMI = 40.33 is on GLP-1 agonist therapy for type 2 diabetes treatment and weight loss.  Rationale for plan: Patient denies any side effects going up to Ozempic 2 mg.  Blood sugars well controlled.  She is reporting moderate appetite suppression and is at weight loss plateau, is interested in switching to Mounjaro for additional weight loss potential.     PLAN:  CONTINUE Ozempic 2 mg weekly,   No refill needed at this time   Interested in switching to Mounjaro for additional weight loss potential   Will submit PA  Education provided:   Discussed importance of maintaining adequate nutrition and hydration throughout the day to support weight management and provide fuel and energy for the body  Goal to eat small meals throughout the day and will  incorporate protein shakes  Counseled patient on MOA, expectations, side effects, duration of therapy, contraindications, administration, and monitoring parameters  Answered all patient questions and concerns  Clinical Pharmacist follow up: 5/20/25 1140 am  PCP follow up: 5/27/25    Continue all meds under the continuation of care with the referring provider and clinical pharmacy team.    Annie Levy, Jazlyn  Clinical Pharmacy Specialist, Primary Care   [phone]    Verbal consent to manage patient's drug therapy was obtained from patient. They were informed they may decline to participate or withdraw from participation in pharmacy services at any time.

## 2025-05-02 ENCOUNTER — APPOINTMENT (OUTPATIENT)
Dept: UROLOGY | Facility: CLINIC | Age: 75
End: 2025-05-02
Payer: COMMERCIAL

## 2025-05-02 VITALS — SYSTOLIC BLOOD PRESSURE: 121 MMHG | DIASTOLIC BLOOD PRESSURE: 77 MMHG | HEART RATE: 82 BPM

## 2025-05-02 DIAGNOSIS — R31.0 GROSS HEMATURIA: Primary | ICD-10-CM

## 2025-05-02 NOTE — PROGRESS NOTES
OFFICE CYSTOSCOPY  Indication: gross hematuria, age 75  Hx: nonsmoker, recent UTI    The patient  was brought into the procedure suite and informed consent was reviewed and confirmed.     The patient was asssisted onto the stretcher, placed supine and froglegged, prepped with betadine and draped in the usual standard surgical fashion.  Intraurethral 2% viscous lidocaine jelly was used for local analgesia.  A 16 Portuguese flexible cystourethroscope was inserted into the urethra.       Upon entering the bladder the entire bladder was surveyed in a 360 degree fashion.      There was no evidence of any bladder masses, foreign objects, or stones. There were a few red flat uniformly circular area noted on the bladder mucosa (images in media tab). The scope was then removed and in an antegrade fashion, the urethra and bladder were again resurveyed with no evidence of additional lesions.  The cystoscope was then fully removed.   The patient tolerated the procedure well.      Findings consistent with recent UTI    Plan for repeat in 6 months for stability     Hanna Vallejo MD, Gynecologist  Female Reconstruction & Sexual Medicine Fellow  Dept of Urology/OBGYN  5/2/2025

## 2025-05-20 ENCOUNTER — APPOINTMENT (OUTPATIENT)
Dept: PHARMACY | Facility: HOSPITAL | Age: 75
End: 2025-05-20
Payer: MEDICARE

## 2025-05-27 ENCOUNTER — APPOINTMENT (OUTPATIENT)
Dept: PRIMARY CARE | Facility: CLINIC | Age: 75
End: 2025-05-27
Payer: MEDICARE

## 2025-05-27 VITALS
DIASTOLIC BLOOD PRESSURE: 82 MMHG | BODY MASS INDEX: 37.95 KG/M2 | WEIGHT: 201 LBS | HEART RATE: 77 BPM | HEIGHT: 61 IN | SYSTOLIC BLOOD PRESSURE: 143 MMHG

## 2025-05-27 DIAGNOSIS — I10 HYPERTENSION, UNSPECIFIED TYPE: ICD-10-CM

## 2025-05-27 DIAGNOSIS — F33.1 MDD (MAJOR DEPRESSIVE DISORDER), RECURRENT EPISODE, MODERATE: ICD-10-CM

## 2025-05-27 DIAGNOSIS — I10 PRIMARY HYPERTENSION: ICD-10-CM

## 2025-05-27 DIAGNOSIS — E78.5 HYPERLIPIDEMIA, UNSPECIFIED HYPERLIPIDEMIA TYPE: ICD-10-CM

## 2025-05-27 DIAGNOSIS — E55.9 VITAMIN D DEFICIENCY: ICD-10-CM

## 2025-05-27 DIAGNOSIS — E11.22 TYPE 2 DIABETES MELLITUS WITH CHRONIC KIDNEY DISEASE, WITHOUT LONG-TERM CURRENT USE OF INSULIN, UNSPECIFIED CKD STAGE (MULTI): ICD-10-CM

## 2025-05-27 PROCEDURE — 3079F DIAST BP 80-89 MM HG: CPT | Performed by: INTERNAL MEDICINE

## 2025-05-27 PROCEDURE — 3077F SYST BP >= 140 MM HG: CPT | Performed by: INTERNAL MEDICINE

## 2025-05-27 PROCEDURE — 1036F TOBACCO NON-USER: CPT | Performed by: INTERNAL MEDICINE

## 2025-05-27 PROCEDURE — 99214 OFFICE O/P EST MOD 30 MIN: CPT | Performed by: INTERNAL MEDICINE

## 2025-05-27 PROCEDURE — G2211 COMPLEX E/M VISIT ADD ON: HCPCS | Performed by: INTERNAL MEDICINE

## 2025-05-27 PROCEDURE — 3008F BODY MASS INDEX DOCD: CPT | Performed by: INTERNAL MEDICINE

## 2025-05-27 RX ORDER — METHOCARBAMOL 500 MG/1
500 TABLET, FILM COATED ORAL 4 TIMES DAILY PRN
Qty: 40 TABLET | Refills: 1 | Status: SHIPPED | OUTPATIENT
Start: 2025-05-27 | End: 2025-07-26

## 2025-05-27 ASSESSMENT — LIFESTYLE VARIABLES
AUDIT-C TOTAL SCORE: 0
SKIP TO QUESTIONS 9-10: 1
HOW MANY STANDARD DRINKS CONTAINING ALCOHOL DO YOU HAVE ON A TYPICAL DAY: PATIENT DOES NOT DRINK
HOW OFTEN DO YOU HAVE A DRINK CONTAINING ALCOHOL: NEVER
HOW OFTEN DO YOU HAVE SIX OR MORE DRINKS ON ONE OCCASION: NEVER

## 2025-05-27 NOTE — PROGRESS NOTES
"Subjective   Patient ID: Leslie Morelos is a 75 y.o. female who presents for Follow-up.     Doing well. In good spirits. States she has bilateral symmetrical tan since past 1 week without any triggers. States she stays indoors and is always fully covered, no sun exposure. No itchiness, no new products. No recent open wounds or infection or leg swelling or erythema.    Objective   /82 (BP Location: Right arm, Patient Position: Sitting, BP Cuff Size: Adult)   Pulse 77   Ht 1.549 m (5' 1\")   Wt 91.2 kg (201 lb)   LMP 01/01/2002 (Approximate)   BMI 37.98 kg/m²     Physical Exam  Constitutional: Appears stated age. In NAD.   HEENT: NC/AT, EOMI, clear sclera, moist mucous membranes  CV: RRR, No M/R/G  PULM: CTAB, no coughing or wheezing  ABDOMEN: Soft, NT/ND. No TTP. + BSx4.  SKIN: Normal Color, Warm, Dry, No Rashes   EXTREMITIES: Non-Tender, Full ROM, No Pedal Edema  NEURO: A&O x 4, nonfocal neurological exam.  PSYCH: Normal Mood & Behavior    Assessment/Plan   Problem List Items Addressed This Visit       Hypertension    Relevant Medications    methocarbamol (Robaxin) 500 mg tablet    Vitamin D deficiency    Relevant Medications    methocarbamol (Robaxin) 500 mg tablet    Type 2 diabetes mellitus with chronic kidney disease, without long-term current use of insulin, unspecified CKD stage (Multi)    Relevant Medications    methocarbamol (Robaxin) 500 mg tablet    MDD (major depressive disorder), recurrent episode, moderate    Relevant Medications    methocarbamol (Robaxin) 500 mg tablet    Hyperlipidemia    Relevant Medications    methocarbamol (Robaxin) 500 mg tablet     Sarita JEANETTE Morelos is a 73 y.o. female with a hx of HTN, dyslipidemia, obesity, gastric bypass surgery, cholecystectomy who presents for follow up and medication refill.      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 No acute osseous  abnormality. Varying degrees of " degenerative change with at least moderate spinal  canal stenosis at L2-L3 and L3-L4 with potential severe spinal canal  stenosis at these levels. Moderate multilevel neural foraminal narrowing. Severe, relatively diffuse facet arthropathy.  - Diclofenac gel PRN and Robaxin PRN  - Patient has been doing physical therapy on her own  - Saw orthopedic surgeon who recommended continuing non operative management  - Referral for physical therapy provided     #Hx of cholecystectomy  #1.4cm CBD dilation on MRCP  - Asymptomatic, normal LFTs  - Will follow up with Dr. Patel in 3/2024 for biliary dilation     #Morbid obesity  #Hx of gastric bypass surgery  - On Ozempic to 2mg weekly.      #HTN  - Well controlled at home. 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 Next due in 10/2025.  - Colonoscopy 12/2023 showed One polyp in the transverse colon; Abnormal mucosa in the distal rectum and performed cold forceps biopsy for dysplasia screening; next colonoscopy due in 12/2025. Will provide requisition next visit.    Follow up in September 2025

## 2025-06-20 ENCOUNTER — APPOINTMENT (OUTPATIENT)
Dept: UROLOGY | Facility: CLINIC | Age: 75
End: 2025-06-20
Payer: MEDICARE

## 2025-06-20 VITALS
DIASTOLIC BLOOD PRESSURE: 82 MMHG | BODY MASS INDEX: 37.76 KG/M2 | HEART RATE: 78 BPM | WEIGHT: 200 LBS | TEMPERATURE: 97.7 F | HEIGHT: 61 IN | SYSTOLIC BLOOD PRESSURE: 120 MMHG

## 2025-06-20 DIAGNOSIS — N39.41 URGE INCONTINENCE OF URINE: Primary | ICD-10-CM

## 2025-06-20 DIAGNOSIS — R39.15 URGENCY OF URINATION: ICD-10-CM

## 2025-06-20 DIAGNOSIS — R35.0 FREQUENCY OF URINATION: ICD-10-CM

## 2025-06-20 PROCEDURE — 64566 NEUROELTRD STIM POST TIBIAL: CPT | Performed by: NURSE PRACTITIONER

## 2025-06-20 NOTE — PROGRESS NOTES
"06/20/25   [id number]    PTNS     Subjective      HPI Sarita Morelos \"Leslie\" is a 75 y.o. female who presents for PTNS as kindly requested by Dr. Hanna Vallejo, hx medtroninic, botox, excited to try PTNS; Per patient medtronic worked during trial but then once implanted didn't help symptoms; Reviewed REVI information and story book this morning;     Planning for repeat cystoscopy in Nov w Dr. Mitchell    DTF every 45 min to 90 min  NTF 0 but as soon as awakens rushing with urgency to bathroom  Incontinence 4-5 pad changes per day    PMH, PSH, FH, SH reviewed    Objective     /82   Pulse 78   Temp 36.5 °C (97.7 °F)   Ht 1.549 m (5' 1\")   Wt 90.7 kg (200 lb)   LMP 01/01/2002 (Approximate)   BMI 37.79 kg/m²    Physical Exam  General: Appears comfortable and in no apparent distress, well nourished  Head: Normocephalic, atraumatic  Neck: trachea midline  Respiratory: respirations unlabored, no wheezes, and no use of accessory muscles  Cardiovascular: at rest no dyspnea, well perfused  Skin: no visible rashes or lesions  Neurologic: grossly intact, oriented to person, place, and time  Psychiatric: mood and affect appropriate  Musculoskeletal: in chair for appt. no difficulty w upper body movement    Assessment/Plan   Problem List Items Addressed This Visit          Genitourinary and Reproductive    Urge incontinence of urine - Primary     Other Visit Diagnoses         Urgency of urination          Frequency of urination              No orders of the defined types were placed in this encounter.   Patient ID: Sarita Morelos \"Leslie\" is a 75 y.o. female.    Percutaneous Tibial Nerve Stimulation    Date/Time: 6/20/2025 9:42 AM    Performed by: PASHA Kelly  Authorized by: PASHA Kelly    Procedure Details     Indications: urge incontinence and urinary urgency      PTNS session #: 1    Ankle used: left      Stimulator intensity (mA): 19        PTNS monthly x 11 more " sessions  Reviewed Revi will consider, also Gemtesa an option if more assistance needed  Willing to come to [place] office for treatments  Bladder retraining and voiding diary given and discussed today  Nurse line [phone]       Check out:   PTNS weekly x 11 more sessions, MSO slot, ok to go to [place] if needed to get appt. In.     Thanks you!  Didi Carrera, APRN-CNP  Lab Results   Component Value Date    GLUCOSE 84 01/28/2025    CALCIUM 9.3 01/28/2025     01/28/2025    K 4.3 01/28/2025    CO2 28 01/28/2025     01/28/2025    BUN 19 01/28/2025    CREATININE 0.92 04/09/2025

## 2025-06-20 NOTE — PATIENT INSTRUCTIONS
PTNS monthly x 11 more sessions  Reviewed Revi will consider, also Gemtesa an option if more assistance needed  Willing to come to [place] office for treatments  Bladder retraining and voiding diary given and discussed today  Nurse line [phone]

## 2025-06-23 ENCOUNTER — APPOINTMENT (OUTPATIENT)
Dept: PHARMACY | Facility: HOSPITAL | Age: 75
End: 2025-06-23
Payer: MEDICARE

## 2025-06-23 DIAGNOSIS — E11.22 TYPE 2 DIABETES MELLITUS WITH CHRONIC KIDNEY DISEASE, WITHOUT LONG-TERM CURRENT USE OF INSULIN, UNSPECIFIED CKD STAGE (MULTI): ICD-10-CM

## 2025-06-23 DIAGNOSIS — E66.01 MORBID OBESITY (MULTI): ICD-10-CM

## 2025-06-23 RX ORDER — TIRZEPATIDE 7.5 MG/.5ML
7.5 INJECTION, SOLUTION SUBCUTANEOUS WEEKLY
Qty: 2 ML | Refills: 0 | Status: SHIPPED | OUTPATIENT
Start: 2025-06-23 | End: 2025-06-23 | Stop reason: SDUPTHER

## 2025-06-23 RX ORDER — TIRZEPATIDE 7.5 MG/.5ML
7.5 INJECTION, SOLUTION SUBCUTANEOUS WEEKLY
Qty: 2 ML | Refills: 0 | Status: SHIPPED | OUTPATIENT
Start: 2025-06-23

## 2025-06-23 NOTE — PROGRESS NOTES
"Pharmacist Clinic: Weight Management    Sarita Morelos \"Leslie\" was referred to the Clinical Pharmacy Team for weight management.     Referring Provider: Marilee Hodges, *  - Last visit with referring provider: 5/27/25    HISTORY OF PRESENT ILLNESS    - Patient with baseline BMI = 40.33 referred to assist with titration of GLP-1 agonist therapy for weight loss  - Patient has a history of type 2 diabetes, well controlled with last HbA1c 5.7; she does not monitor blood sugars at home  - Previous eli en y gastric bypass 2016   - Ozempic 2 mg weekly for blood sugar control and weight loss   - Noticed moderate appetite suppression but not much change in weight       Diet:   - regular, small portions   - Salad, Collins, rice, Breakfast sandwich   - Save half meals for later   - Avoids snacks- chips and soda    Exercise Routine:   - is wearing back support     Weight loss:   - Lost 70 lbs  - Current weight: 200 lb  - Baseline weight: 247 lb (11/2023)  - Goal <200 lb    Adverse effects: None reported     PHARMACY ASSESSMENT    CURRENT WEIGHT LOSS PHARMACOTHERAPY  - Ozempic 2 mg weekly      AFFORDABILITY/ACCESSIBILITY  - No issues reported    DRUG INTERACTIONS  - No significant drug-drug interactions exist that require adjustment to therapy    Pertinent PMH Review: from chart review  - PMH of Pancreatitis: No  - PMH of Retinopathy: No  - PMH of MTC: No    Allergies: Gabapentin, Nitrofurantoin, and Sulfamethoxazole-trimethoprim     [id number] - [place], [place] - [address] [place] [address]  Phone: [phone] Fax: [phone]    [facility] - [place] - [facility] AT Portal to Registered [facility] Sites  [facility]  [place] [address]  Phone: [phone] Fax: [phone]    [facility]  [address], [place] [address]  Phone: [phone] Fax: [phone]      LAB  Lab Results   Component " Value Date    BILITOT 0.5 06/19/2024    CALCIUM 9.3 01/28/2025    CO2 28 01/28/2025     01/28/2025    CREATININE 0.92 04/09/2025    GLUCOSE 84 01/28/2025    ALKPHOS 140 (H) 06/19/2024    K 4.3 01/28/2025    PROT 8.0 06/19/2024     01/28/2025    AST 19 06/19/2024    ALT 18 06/19/2024    BUN 19 01/28/2025    ANIONGAP 14 06/19/2024    MG 1.80 08/01/2022    PHOS 3.6 01/28/2025    ALBUMIN 4.1 01/28/2025    LIPASE 14 09/12/2023    EGFR 66 04/09/2025     Lab Results   Component Value Date    TRIG 57 10/22/2024    CHOL 116 10/22/2024    LDLCALC 46 10/22/2024    HDL 58.2 10/22/2024     Lab Results   Component Value Date    HGBA1C 5.7 (H) 01/28/2025       Current Outpatient Medications on File Prior to Visit   Medication Sig Dispense Refill    amLODIPine (Norvasc) 10 mg tablet Take 1 tablet (10 mg) by mouth once daily. 90 tablet 1    atorvastatin (Lipitor) 40 mg tablet TAKE ONE TABLET BY MOUTH EVERY DAY AT BEDTIME 90 tablet 0    azelastine (Optivar) 0.05 % ophthalmic solution 1 drop both eyes up to 2 times a day as needed for itching/allergies 6 mL 11    cholecalciferol (Vitamin D-3) 25 MCG (1000 UT) tablet Take 1 tablet (25 mcg) by mouth once daily.      diclofenac sodium (Voltaren) 1 % gel gel APPLY 1 APPLICATION TOPICALLY 4 TIMES A DAY. 100 g 1    DULoxetine (Cymbalta) 60 mg DR capsule Take 2 capsules (120 mg) by mouth once daily. 180 capsule 1    estradiol (Vagifem) 10 mcg tablet vaginal tablet Place 1 tablet nightly for 2 weeks then 2 times a week thereafter. 24 tablet 4    ferrous sulfate 325 (65 Fe) MG tablet Take 1 tablet by mouth 1 (one) time per week.      methocarbamol (Robaxin) 500 mg tablet Take 1 tablet (500 mg) by mouth 4 times a day as needed for muscle spasms. 40 tablet 1    metoprolol tartrate (Lopressor) 75 mg tablet Take 1 tablet (75 mg) by mouth once daily. 90 tablet 1    multivit with minerals/lutein (MULTIVITAMIN 50 PLUS ORAL) Take 1 tablet by mouth 3 times a week.      omeprazole (PriLOSEC)  20 mg DR capsule Take 1 capsule (20 mg) by mouth once daily as needed (as needed for reflux). 30 capsule 2    semaglutide 2 mg/dose (8 mg/3 mL) pen injector Inject 2 mg under the skin 1 (one) time per week. 9 mL 1    turmeric root extract 500 mg capsule Take 1 capsule by mouth once daily.      ubidecarenone (CO Q-10 ORAL) Take 1 tablet by mouth once daily.       No current facility-administered medications on file prior to visit.       PATIENT EDUCATION/GOALS  - Target goal 5% to 10% weight loss within 3-6 months  - Counseled patient on MOA, expectations, side effects, duration of therapy, contraindications, administration, and monitoring parameters  - Answered all patient questions and concerns; provided [facility] phone number if issues/questions arise    RECOMMENDATIONS/PLAN  Problem List Items Addressed This Visit       Morbid obesity (Multi)    Relevant Medications    tirzepatide (Mounjaro) 7.5 mg/0.5 mL pen injector    Other Relevant Orders    Referral to Clinical Pharmacy    Type 2 diabetes mellitus with chronic kidney disease, without long-term current use of insulin, unspecified CKD stage (Multi)    Relevant Medications    tirzepatide (Mounjaro) 7.5 mg/0.5 mL pen injector    Other Relevant Orders    Referral to Clinical Pharmacy       ASSESSMENT:  Patient with baseline BMI = 40.33 is on GLP-1 agonist therapy for type 2 diabetes treatment and weight loss.  Rationale for plan: Patient denies any side effects on Ozempic 2 mg.  Blood sugars well controlled.  She is reporting moderate appetite suppression and is at weight loss plateau, prior auth submitted and approved for Mounjaro for additional weight loss support.     PLAN:  SWITCH from Ozempic to Mounjaro 7.5 mg weekly at next day of dose due   Send to [facility] per patient request  Prefers 3 month supply but given potential for dose titration up or down will send 1 month for now   Education provided:   Discussed importance of maintaining adequate nutrition and  hydration throughout the day to support weight management and provide fuel and energy for the body  Goal to eat small meals throughout the day and will incorporate protein shakes  Counseled patient on MOA, expectations, side effects, duration of therapy, contraindications, administration, and monitoring parameters  Answered all patient questions and concerns  Clinical Pharmacist follow up: 7/29/25 1040 am  PCP follow up: 9/9/25    Continue all meds under the continuation of care with the referring provider and clinical pharmacy team.    Annie Levy, PharmD  Clinical Pharmacy Specialist, Primary Care   [phone]    Verbal consent to manage patient's drug therapy was obtained from patient. They were informed they may decline to participate or withdraw from participation in pharmacy services at any time.

## 2025-06-24 ENCOUNTER — APPOINTMENT (OUTPATIENT)
Dept: UROLOGY | Facility: CLINIC | Age: 75
End: 2025-06-24
Payer: MEDICARE

## 2025-06-24 VITALS
DIASTOLIC BLOOD PRESSURE: 77 MMHG | SYSTOLIC BLOOD PRESSURE: 119 MMHG | BODY MASS INDEX: 37.57 KG/M2 | WEIGHT: 199 LBS | HEIGHT: 61 IN | TEMPERATURE: 97.9 F | HEART RATE: 90 BPM

## 2025-06-24 DIAGNOSIS — R39.15 URGENCY OF URINATION: ICD-10-CM

## 2025-06-24 DIAGNOSIS — N20.0 NEPHROLITHIASIS: ICD-10-CM

## 2025-06-24 DIAGNOSIS — R10.9 RIGHT FLANK PAIN: Primary | ICD-10-CM

## 2025-06-24 DIAGNOSIS — N30.00 ACUTE CYSTITIS WITHOUT HEMATURIA: ICD-10-CM

## 2025-06-24 DIAGNOSIS — N39.41 URGE INCONTINENCE: ICD-10-CM

## 2025-06-24 LAB
POC APPEARANCE, URINE: ABNORMAL
POC BILIRUBIN, URINE: NEGATIVE
POC BLOOD, URINE: ABNORMAL
POC COLOR, URINE: YELLOW
POC GLUCOSE, URINE: NEGATIVE MG/DL
POC KETONES, URINE: NEGATIVE MG/DL
POC LEUKOCYTES, URINE: ABNORMAL
POC NITRITE,URINE: POSITIVE
POC PH, URINE: 6 PH
POC PROTEIN, URINE: ABNORMAL MG/DL
POC SPECIFIC GRAVITY, URINE: 1.02
POC UROBILINOGEN, URINE: 0.2 EU/DL

## 2025-06-24 PROCEDURE — 64566 NEUROELTRD STIM POST TIBIAL: CPT | Performed by: NURSE PRACTITIONER

## 2025-06-24 PROCEDURE — 99214 OFFICE O/P EST MOD 30 MIN: CPT | Performed by: NURSE PRACTITIONER

## 2025-06-24 PROCEDURE — 81003 URINALYSIS AUTO W/O SCOPE: CPT | Performed by: NURSE PRACTITIONER

## 2025-06-24 RX ORDER — AMOXICILLIN AND CLAVULANATE POTASSIUM 875; 125 MG/1; MG/1
1 TABLET, FILM COATED ORAL 2 TIMES DAILY
Qty: 14 TABLET | Refills: 0 | Status: SHIPPED | OUTPATIENT
Start: 2025-06-24 | End: 2025-07-01

## 2025-06-24 NOTE — PROGRESS NOTES
"06/24/25   [id number]    Right flank pain, concern kidney stone and PTNS #2     Subjective      HPI Sarita Morelos \"Leslie\" is a 75 y.o. female who presents for right flank pain and concern for kidney stone and PTNS #2; hx medtroninic, botox;    Woke up in middle of night R flank pain, considering going to ER but knew she was coming here today for PTNS; no fever, no chills, VSS; no gross hematuria, urine today in office;     Gave urine sample, appears infected, Rx augmentin sent in considering allergies and other drug interactions with medications;     CT Urogram 4/11/25 IMPRESSION:  1.  Bilateral nonobstructing renal calculi, measuring 8 mm on the  right and 6 mm on the left.  2. No suspicious mass or abnormal enhancement. Simple left renal  cysts.  3. Urinary bladder unremarkable.  4. Colonic diverticulosis without diverticulitis.              PMH, PSH, FH, SH reviewed    Last visit: Per patient medtronic worked during trial but then once implanted didn't help symptoms; Reviewed REVI information and story book this morning;     Planning for repeat cystoscopy in Nov w Dr. Mitchell    DTF every 45 min to 90 min  NTF 0 but as soon as awakens rushing with urgency to bathroom  Incontinence 4-5 pad changes per day        Objective     /77   Pulse 90   Temp 36.6 °C (97.9 °F)   Ht 1.549 m (5' 1\")   Wt 90.3 kg (199 lb)   LMP 01/01/2002 (Approximate)   BMI 37.60 kg/m²    Physical Exam  General: Appears comfortable and in no apparent distress, well nourished  Head: Normocephalic, atraumatic  Neck: trachea midline  Respiratory: respirations unlabored, no wheezes, and no use of accessory muscles  Cardiovascular: at rest no dyspnea, well perfused  Skin: no visible rashes or lesions  Neurologic: grossly intact, oriented to person, place, and time  Psychiatric: mood and affect appropriate  Musculoskeletal: in chair for appt. no difficulty w upper body movement    Assessment/Plan   Problem List Items Addressed " "This Visit    None  Visit Diagnoses         Right flank pain    -  Primary    Relevant Orders    CT abdomen pelvis wo IV contrast    Basic metabolic panel    CBC and Auto Differential    POCT UA Automated manually resulted (Completed)    Urinalysis with Reflex Culture and Microscopic      Nephrolithiasis        Relevant Orders    CT abdomen pelvis wo IV contrast    Basic metabolic panel    CBC and Auto Differential    POCT UA Automated manually resulted (Completed)    Urinalysis with Reflex Culture and Microscopic      Acute cystitis without hematuria        Relevant Medications    amoxicillin-clavulanate (Augmentin) 875-125 mg tablet      Urgency of urination          Urge incontinence                Orders Placed This Encounter   Procedures    CT abdomen pelvis wo IV contrast     Standing Status:   Future     Expected Date:   6/24/2025     Expiration Date:   6/24/2026     Reason for exam::   renal stone protocol, right flank pain     Radiologist to Determine Optimal Study:   Yes     Release result to [employer]:   Immediate [1]     Is this exam part of a Research Study? If Yes, link this order to the research study:   No     May utilize  for port access if port present for this exam.:   Yes    Basic metabolic panel     Standing Status:   Future     Number of Occurrences:   1     Expected Date:   6/24/2025     Expiration Date:   6/24/2026     Release result to [employer]:   Immediate [1]    CBC and Auto Differential     Standing Status:   Future     Number of Occurrences:   1     Expected Date:   6/24/2025     Expiration Date:   6/24/2026     Release result to [employer]:   Immediate [1]    Urinalysis with Reflex Culture and Microscopic     Release result to MyChart:   Immediate [1]    POCT UA Automated manually resulted     Release result to MyChart:   Immediate [1]    Percutaneous Tibial Nerve Stimulation     This order was created via procedure documentation    Patient ID: Sarita REID Mary Morelos \"Leslie\" is a " 75 y.o. female.    Percutaneous Tibial Nerve Stimulation    Date/Time: 6/24/2025 2:30 PM    Performed by: PASHA Kelly  Authorized by: PASHA Kelly    Procedure Details     Indications: urge incontinence and urinary urgency      PTNS session #: 2    Ankle used: left      Stimulator intensity (mA): 19        CT schedule before leave  Blood work  Urine here today  PTNS x 10 more treatments  Go to ER if fever, chills, nausea or vomiting  Nurse line [phone]       Schedule CT stat abd/pel wo IV contrast  PTNS weekly x 10 more if not already scheduled please.   Thank yoU!    Thanks you!  PASHA Kelly  Lab Results   Component Value Date    GLUCOSE 84 01/28/2025    CALCIUM 9.3 01/28/2025     01/28/2025    K 4.3 01/28/2025    CO2 28 01/28/2025     01/28/2025    BUN 19 01/28/2025    CREATININE 0.92 04/09/2025

## 2025-06-24 NOTE — PATIENT INSTRUCTIONS
CT schedule before leave  Blood work  Urine here today  PTNS x 10 more treatments  Go to ER if fever, chills, nausea or vomiting  Nurse line [phone]

## 2025-06-25 ENCOUNTER — HOSPITAL ENCOUNTER (OUTPATIENT)
Dept: RADIOLOGY | Facility: HOSPITAL | Age: 75
Discharge: HOME | End: 2025-06-25
Payer: MEDICARE

## 2025-06-25 DIAGNOSIS — R10.9 RIGHT FLANK PAIN: ICD-10-CM

## 2025-06-25 DIAGNOSIS — N20.0 NEPHROLITHIASIS: ICD-10-CM

## 2025-06-25 PROCEDURE — 74176 CT ABD & PELVIS W/O CONTRAST: CPT | Performed by: RADIOLOGY

## 2025-06-25 PROCEDURE — 74176 CT ABD & PELVIS W/O CONTRAST: CPT

## 2025-06-25 NOTE — PROGRESS NOTES
Spoke with patient, antibiotics helping, no obstructing stone. Discussed if fever, chills, nausea or vomiting go to ER; feeling tanja today. No concerns per patient. Thanked me for calling.

## 2025-06-26 ENCOUNTER — APPOINTMENT (OUTPATIENT)
Dept: UROLOGY | Facility: CLINIC | Age: 75
End: 2025-06-26
Payer: MEDICARE

## 2025-06-26 LAB
ANION GAP SERPL CALCULATED.4IONS-SCNC: 10 MMOL/L (CALC) (ref 7–17)
BASOPHILS # BLD AUTO: 26 CELLS/UL (ref 0–200)
BASOPHILS NFR BLD AUTO: 0.3 %
BUN SERPL-MCNC: 14 MG/DL (ref 7–25)
BUN/CREAT SERPL: NORMAL (CALC) (ref 6–22)
CALCIUM SERPL-MCNC: 9.4 MG/DL (ref 8.6–10.4)
CHLORIDE SERPL-SCNC: 104 MMOL/L (ref 98–110)
CO2 SERPL-SCNC: 24 MMOL/L (ref 20–32)
CREAT SERPL-MCNC: 0.85 MG/DL (ref 0.6–1)
EGFRCR SERPLBLD CKD-EPI 2021: 72 ML/MIN/1.73M2
EOSINOPHIL # BLD AUTO: 87 CELLS/UL (ref 15–500)
EOSINOPHIL NFR BLD AUTO: 1 %
ERYTHROCYTE [DISTWIDTH] IN BLOOD BY AUTOMATED COUNT: 13.5 % (ref 11–15)
GLUCOSE SERPL-MCNC: 92 MG/DL (ref 65–99)
HCT VFR BLD AUTO: 39.1 % (ref 35–45)
HGB BLD-MCNC: 12.2 G/DL (ref 11.7–15.5)
LYMPHOCYTES # BLD AUTO: 1966 CELLS/UL (ref 850–3900)
LYMPHOCYTES NFR BLD AUTO: 22.6 %
MCH RBC QN AUTO: 27.4 PG (ref 27–33)
MCHC RBC AUTO-ENTMCNC: 31.2 G/DL (ref 32–36)
MCV RBC AUTO: 87.9 FL (ref 80–100)
MONOCYTES # BLD AUTO: 626 CELLS/UL (ref 200–950)
MONOCYTES NFR BLD AUTO: 7.2 %
NEUTROPHILS # BLD AUTO: 5994 CELLS/UL (ref 1500–7800)
NEUTROPHILS NFR BLD AUTO: 68.9 %
PLATELET # BLD AUTO: 311 THOUSAND/UL (ref 140–400)
PMV BLD REES-ECKER: 10.4 FL (ref 7.5–12.5)
POTASSIUM SERPL-SCNC: 4.6 MMOL/L (ref 3.5–5.3)
RBC # BLD AUTO: 4.45 MILLION/UL (ref 3.8–5.1)
SODIUM SERPL-SCNC: 138 MMOL/L (ref 135–146)
WBC # BLD AUTO: 8.7 THOUSAND/UL (ref 3.8–10.8)

## 2025-06-27 ENCOUNTER — TELEPHONE (OUTPATIENT)
Dept: UROLOGY | Facility: CLINIC | Age: 75
End: 2025-06-27
Payer: MEDICARE

## 2025-06-27 LAB
APPEARANCE UR: ABNORMAL
BACTERIA #/AREA URNS HPF: ABNORMAL /HPF
BACTERIA UR CULT: ABNORMAL
BACTERIA UR CULT: ABNORMAL
BILIRUB UR QL STRIP: NEGATIVE
COLOR UR: ABNORMAL
GLUCOSE UR QL STRIP: NEGATIVE
HGB UR QL STRIP: ABNORMAL
HYALINE CASTS #/AREA URNS LPF: ABNORMAL /LPF
KETONES UR QL STRIP: ABNORMAL
LEUKOCYTE ESTERASE UR QL STRIP: ABNORMAL
NITRITE UR QL STRIP: NEGATIVE
PH UR STRIP: 5.5 [PH] (ref 5–8)
PROT UR QL STRIP: ABNORMAL
RBC #/AREA URNS HPF: ABNORMAL /HPF
SERVICE CMNT-IMP: ABNORMAL
SP GR UR STRIP: 1.02 (ref 1–1.03)
SQUAMOUS #/AREA URNS HPF: ABNORMAL /HPF
WBC #/AREA URNS HPF: ABNORMAL /HPF

## 2025-06-27 NOTE — TELEPHONE ENCOUNTER
----- Message from Didi Carrera sent at 6/27/2025  6:07 AM EDT -----  Augmentin sensitive, please finish. Wbc normal, kidney function good.  ----- Message -----  From: Mar Mahmood MA  Sent: 6/24/2025   3:21 PM EDT  To: JAREN Klely-CNP

## 2025-07-01 ENCOUNTER — APPOINTMENT (OUTPATIENT)
Dept: UROLOGY | Facility: CLINIC | Age: 75
End: 2025-07-01
Payer: COMMERCIAL

## 2025-07-01 ENCOUNTER — APPOINTMENT (OUTPATIENT)
Dept: UROLOGY | Facility: CLINIC | Age: 75
End: 2025-07-01
Payer: MEDICARE

## 2025-07-01 DIAGNOSIS — N39.41 URGE INCONTINENCE: Primary | ICD-10-CM

## 2025-07-01 DIAGNOSIS — R35.0 FREQUENCY OF URINATION: ICD-10-CM

## 2025-07-01 DIAGNOSIS — R39.15 URGENCY OF URINATION: ICD-10-CM

## 2025-07-01 PROCEDURE — 64566 NEUROELTRD STIM POST TIBIAL: CPT | Performed by: NURSE PRACTITIONER

## 2025-07-01 NOTE — PROGRESS NOTES
"07/01/25   [id number]    PTNS #3     Subjective      HPI Sarita Morelos \"Leslie\" is a 75 y.o. female who presents for right flank pain and concern for PTNS #3;     Treated for UTI last visit, thought she was passing stone, no concerns on imaging for passing stone     Feeling better, hematuria cleared up, no fever, chills, nausea or vomiting, no concerns today, plans to start voiding diary now that UTI treated and clearing up;     PMH, PSH, FH, SH reviewed    Last visit: Per patient medtronic worked during trial but then once implanted didn't help symptoms; Reviewed REVI information and story book this morning;     Planning for repeat cystoscopy in Nov w Dr. Mitchell    DTF every 45 min to 90 min  NTF 0 but as soon as awakens rushing with urgency to bathroom  Incontinence 4-5 pad changes per day        Objective     LMP 01/01/2002 (Approximate)    Physical Exam  General: Appears comfortable and in no apparent distress, well nourished  Head: Normocephalic, atraumatic  Neck: trachea midline  Respiratory: respirations unlabored, no wheezes, and no use of accessory muscles  Cardiovascular: at rest no dyspnea, well perfused  Skin: no visible rashes or lesions  Neurologic: grossly intact, oriented to person, place, and time  Psychiatric: mood and affect appropriate  Musculoskeletal: in chair for appt. no difficulty w upper body movement    Assessment/Plan   Problem List Items Addressed This Visit    None  Visit Diagnoses         Urge incontinence    -  Primary      Urgency of urination          Frequency of urination                  Orders Placed This Encounter   Procedures    Percutaneous Tibial Nerve Stimulation     This order was created via procedure documentation    Patient ID: Sarita Morelos \"Leslie\" is a 75 y.o. female.    Percutaneous Tibial Nerve Stimulation    Date/Time: 7/1/2025 2:45 PM    Performed by: PASHA Kelly  Authorized by: PASHA Kelly    Procedure Details     " Indications: urge incontinence and urinary urgency      PTNS session #: 3    Ankle used: left      Stimulator intensity (mA): 10             PTNS weekly x 9 more if not already scheduled please.   Thank yoU!    Thanks you!  Didi Carrera, APRN-CNP  Lab Results   Component Value Date    GLUCOSE 92 06/25/2025    CALCIUM 9.4 06/25/2025     06/25/2025    K 4.6 06/25/2025    CO2 24 06/25/2025     06/25/2025    BUN 14 06/25/2025    CREATININE 0.85 06/25/2025

## 2025-07-08 ENCOUNTER — APPOINTMENT (OUTPATIENT)
Dept: UROLOGY | Facility: CLINIC | Age: 75
End: 2025-07-08
Payer: COMMERCIAL

## 2025-07-08 ENCOUNTER — APPOINTMENT (OUTPATIENT)
Dept: UROLOGY | Facility: CLINIC | Age: 75
End: 2025-07-08
Payer: MEDICARE

## 2025-07-08 VITALS — TEMPERATURE: 99.1 F | SYSTOLIC BLOOD PRESSURE: 138 MMHG | DIASTOLIC BLOOD PRESSURE: 83 MMHG | HEART RATE: 77 BPM

## 2025-07-08 DIAGNOSIS — N39.41 URGE INCONTINENCE: Primary | ICD-10-CM

## 2025-07-08 DIAGNOSIS — R35.0 FREQUENCY OF URINATION: ICD-10-CM

## 2025-07-08 DIAGNOSIS — R39.15 URGENCY OF URINATION: ICD-10-CM

## 2025-07-08 LAB
POC APPEARANCE, URINE: ABNORMAL
POC BILIRUBIN, URINE: NEGATIVE
POC BLOOD, URINE: ABNORMAL
POC COLOR, URINE: ABNORMAL
POC GLUCOSE, URINE: NEGATIVE MG/DL
POC KETONES, URINE: NEGATIVE MG/DL
POC LEUKOCYTES, URINE: ABNORMAL
POC NITRITE,URINE: POSITIVE
POC PH, URINE: 6 PH
POC PROTEIN, URINE: ABNORMAL MG/DL
POC SPECIFIC GRAVITY, URINE: 1.02
POC UROBILINOGEN, URINE: 1 EU/DL

## 2025-07-08 PROCEDURE — 81003 URINALYSIS AUTO W/O SCOPE: CPT | Performed by: NURSE PRACTITIONER

## 2025-07-08 PROCEDURE — 64566 NEUROELTRD STIM POST TIBIAL: CPT | Performed by: NURSE PRACTITIONER

## 2025-07-08 ASSESSMENT — ENCOUNTER SYMPTOMS
LOSS OF SENSATION IN FEET: 1
DEPRESSION: 0
OCCASIONAL FEELINGS OF UNSTEADINESS: 1

## 2025-07-08 NOTE — PROGRESS NOTES
"07/08/25   [id number]    PTNS #4     Subjective      HPI Sarita Morelos \"Leslie\" is a 75 y.o. female who presents for right flank pain and concern for PTNS #4;     Some burning this morning, will send urine culture, worried UTI coming back;  happy with results PTNS, sleeping at night and not having accident;        PMH, PSH, FH, SH reviewed    Last visit: Per patient medtronic worked during trial but then once implanted didn't help symptoms; Reviewed REVI information and story book this morning;     Planning for repeat cystoscopy in Nov w Dr. Mitchell    DTF every 45 min to 90 min  NTF 0 but as soon as awakens rushing with urgency to bathroom  Incontinence 4-5 pad changes per day        Objective     /83   Pulse 77   Temp 37.3 °C (99.1 °F) (Temporal)   LMP 01/01/2002 (Approximate)    Physical Exam  General: Appears comfortable and in no apparent distress, well nourished  Head: Normocephalic, atraumatic  Neck: trachea midline  Respiratory: respirations unlabored, no wheezes, and no use of accessory muscles  Cardiovascular: at rest no dyspnea, well perfused  Skin: no visible rashes or lesions  Neurologic: grossly intact, oriented to person, place, and time  Psychiatric: mood and affect appropriate  Musculoskeletal: in chair for appt. no difficulty w upper body movement    Assessment/Plan   Problem List Items Addressed This Visit    None  Visit Diagnoses         Urge incontinence    -  Primary    Relevant Orders    Urinalysis with Reflex Culture and Microscopic      Urgency of urination        Relevant Orders    Urinalysis with Reflex Culture and Microscopic      Frequency of urination                    Orders Placed This Encounter   Procedures    Urinalysis with Reflex Culture and Microscopic     Release result to MyChart:   Immediate [1]    Percutaneous Tibial Nerve Stimulation     This order was created via procedure documentation    Patient ID: Sarita Morelos \"Leslie\" is a 75 y.o. " female.    Percutaneous Tibial Nerve Stimulation    Date/Time: 7/8/2025 2:43 PM    Performed by: PASHA Kelly  Authorized by: PASHA Kelly    Procedure Details     Indications: urge incontinence and urinary urgency      PTNS session #: 4    Ankle used: left      Stimulator intensity (mA): 11             PTNS weekly x 8 more if not already scheduled please.   Thank yoU!    Thanks you!  PASAH Kelly  Lab Results   Component Value Date    GLUCOSE 92 06/25/2025    CALCIUM 9.4 06/25/2025     06/25/2025    K 4.6 06/25/2025    CO2 24 06/25/2025     06/25/2025    BUN 14 06/25/2025    CREATININE 0.85 06/25/2025

## 2025-07-11 DIAGNOSIS — N30.00 ACUTE CYSTITIS WITHOUT HEMATURIA: Primary | ICD-10-CM

## 2025-07-11 LAB
APPEARANCE UR: ABNORMAL
BACTERIA #/AREA URNS HPF: ABNORMAL /HPF
BACTERIA UR CULT: ABNORMAL
BACTERIA UR CULT: ABNORMAL
BILIRUB UR QL STRIP: NEGATIVE
COLOR UR: YELLOW
GLUCOSE UR QL STRIP: NEGATIVE
HGB UR QL STRIP: ABNORMAL
HYALINE CASTS #/AREA URNS LPF: ABNORMAL /LPF
KETONES UR QL STRIP: NEGATIVE
LEUKOCYTE ESTERASE UR QL STRIP: ABNORMAL
NITRITE UR QL STRIP: NEGATIVE
PH UR STRIP: 6 [PH] (ref 5–8)
PROT UR QL STRIP: ABNORMAL
RBC #/AREA URNS HPF: ABNORMAL /HPF
SERVICE CMNT-IMP: ABNORMAL
SP GR UR STRIP: 1.02 (ref 1–1.03)
SQUAMOUS #/AREA URNS HPF: ABNORMAL /HPF
WBC #/AREA URNS HPF: ABNORMAL /HPF

## 2025-07-11 RX ORDER — AMOXICILLIN AND CLAVULANATE POTASSIUM 875; 125 MG/1; MG/1
1 TABLET, FILM COATED ORAL 2 TIMES DAILY
Qty: 14 TABLET | Refills: 0 | Status: SHIPPED | OUTPATIENT
Start: 2025-07-11 | End: 2025-07-18

## 2025-07-12 DIAGNOSIS — E78.5 DYSLIPIDEMIA: ICD-10-CM

## 2025-07-12 DIAGNOSIS — I10 PRIMARY HYPERTENSION: ICD-10-CM

## 2025-07-12 DIAGNOSIS — Z98.84 GASTRIC BYPASS STATUS FOR OBESITY: ICD-10-CM

## 2025-07-14 RX ORDER — ATORVASTATIN CALCIUM 40 MG/1
40 TABLET, FILM COATED ORAL NIGHTLY
Qty: 90 TABLET | Refills: 0 | Status: SHIPPED | OUTPATIENT
Start: 2025-07-14

## 2025-07-14 RX ORDER — OMEPRAZOLE 20 MG/1
CAPSULE, DELAYED RELEASE ORAL
Qty: 30 CAPSULE | Refills: 0 | Status: SHIPPED | OUTPATIENT
Start: 2025-07-14

## 2025-07-14 RX ORDER — METOPROLOL TARTRATE 75 MG/1
75 TABLET ORAL DAILY
Qty: 90 TABLET | Refills: 0 | Status: SHIPPED | OUTPATIENT
Start: 2025-07-14

## 2025-07-15 ENCOUNTER — APPOINTMENT (OUTPATIENT)
Dept: UROLOGY | Facility: CLINIC | Age: 75
End: 2025-07-15
Payer: MEDICARE

## 2025-07-15 VITALS — SYSTOLIC BLOOD PRESSURE: 125 MMHG | DIASTOLIC BLOOD PRESSURE: 78 MMHG | HEART RATE: 80 BPM

## 2025-07-15 DIAGNOSIS — N39.41 URGE INCONTINENCE: Primary | ICD-10-CM

## 2025-07-15 DIAGNOSIS — R35.0 FREQUENCY OF URINATION: ICD-10-CM

## 2025-07-15 DIAGNOSIS — R39.15 URGENCY OF URINATION: ICD-10-CM

## 2025-07-15 PROCEDURE — 64566 NEUROELTRD STIM POST TIBIAL: CPT | Performed by: NURSE PRACTITIONER

## 2025-07-15 NOTE — PROGRESS NOTES
"07/15/25   [id number]    PTNS #5     Subjective      HPI Sarita Morelos \"Leslie\" is a 75 y.o. female who presents for right flank pain and concern for PTNS #5;     Feeling so much better since UTI treated and happy with results PTNS, bladder so much better; less frequency; discussed UTI prevention, using estogen tablet in vagina, didn't like cream; discussed dmanose and micah md, will resume taken in past;        PMH, PSH, FH, SH reviewed    Last visit: Per patient medtronic worked during trial but then once implanted didn't help symptoms; Reviewed REVI information and story book this morning;     Planning for repeat cystoscopy in Nov w Dr. Mitchell    DTF every 45 min to 90 min  NTF 0 but as soon as awakens rushing with urgency to bathroom  Incontinence 4-5 pad changes per day        Objective     /78   Pulse 80   LMP 01/01/2002 (Approximate)    Physical Exam  General: Appears comfortable and in no apparent distress, well nourished  Head: Normocephalic, atraumatic  Neck: trachea midline  Respiratory: respirations unlabored, no wheezes, and no use of accessory muscles  Cardiovascular: at rest no dyspnea, well perfused  Skin: no visible rashes or lesions  Neurologic: grossly intact, oriented to person, place, and time  Psychiatric: mood and affect appropriate  Musculoskeletal: in chair for appt. no difficulty w upper body movement    Assessment/Plan   Problem List Items Addressed This Visit    None  Visit Diagnoses         Urge incontinence    -  Primary      Urgency of urination          Frequency of urination                      Orders Placed This Encounter   Procedures    Percutaneous Tibial Nerve Stimulation     This order was created via procedure documentation    Patient ID: Sarita Morelos \"Leslie\" is a 75 y.o. female.    Percutaneous Tibial Nerve Stimulation    Date/Time: 7/15/2025 2:28 PM    Performed by: PASHA Kelly  Authorized by: PASHA Kelly    Procedure " Details     Indications: urge incontinence and urinary urgency      PTNS session #: 5    Ankle used: right      Stimulator intensity (mA): 9             PTNS weekly x 7  Thank you!      Didi Carrera, APRN-CNP  Lab Results   Component Value Date    GLUCOSE 92 06/25/2025    CALCIUM 9.4 06/25/2025     06/25/2025    K 4.6 06/25/2025    CO2 24 06/25/2025     06/25/2025    BUN 14 06/25/2025    CREATININE 0.85 06/25/2025

## 2025-07-22 ENCOUNTER — APPOINTMENT (OUTPATIENT)
Dept: UROLOGY | Facility: CLINIC | Age: 75
End: 2025-07-22
Payer: MEDICARE

## 2025-07-22 DIAGNOSIS — N39.41 URGE INCONTINENCE: Primary | ICD-10-CM

## 2025-07-22 DIAGNOSIS — R39.15 URGENCY OF URINATION: ICD-10-CM

## 2025-07-22 DIAGNOSIS — R35.0 FREQUENCY OF URINATION: ICD-10-CM

## 2025-07-22 DIAGNOSIS — N95.8 GENITOURINARY SYNDROME OF MENOPAUSE: ICD-10-CM

## 2025-07-22 PROCEDURE — 64566 NEUROELTRD STIM POST TIBIAL: CPT | Performed by: NURSE PRACTITIONER

## 2025-07-22 RX ORDER — ESTRADIOL 0.1 MG/G
CREAM VAGINAL
Qty: 42.5 G | Refills: 5 | Status: SHIPPED | OUTPATIENT
Start: 2025-07-22 | End: 2026-07-22

## 2025-07-22 NOTE — PATIENT INSTRUCTIONS
Rx vaginal estrogen cream apply 1 gram with finger to vaginal opening daily x 2 weeks, then 2-3 x per week    PTNS weekly x 6  Thank you!

## 2025-07-22 NOTE — PROGRESS NOTES
"07/22/25   [id number]    PTNS #6     Subjective      HPI Sarita Morelos \"Leslie\" is a 75 y.o. female who presents for right flank pain and concern for PTNS #6;     Feeling so much better since UTI treated and happy with results PTNS, bladder so much better; less frequency; discussed UTI prevention last visit, ordered Dmanose, considering trial estrogen cream;       PMH, PSH, FH, SH reviewed    Last visit: Per patient medtronic worked during trial but then once implanted didn't help symptoms; Reviewed REVI information and story book this morning;     Planning for repeat cystoscopy in Nov w Dr. Mitchell    DTF every 45 min to 90 min  NTF 0 but as soon as awakens rushing with urgency to bathroom  Incontinence 4-5 pad changes per day        Objective     LMP 01/01/2002 (Approximate)    Physical Exam  General: Appears comfortable and in no apparent distress, well nourished  Head: Normocephalic, atraumatic  Neck: trachea midline  Respiratory: respirations unlabored, no wheezes, and no use of accessory muscles  Cardiovascular: at rest no dyspnea, well perfused  Skin: no visible rashes or lesions  Neurologic: grossly intact, oriented to person, place, and time  Psychiatric: mood and affect appropriate  Musculoskeletal: in chair for appt. no difficulty w upper body movement    Assessment/Plan   Problem List Items Addressed This Visit    None  Visit Diagnoses         Urge incontinence    -  Primary      Genitourinary syndrome of menopause        Relevant Medications    estradiol (Estrace) 0.01 % (0.1 mg/gram) vaginal cream      Urgency of urination          Frequency of urination                        Orders Placed This Encounter   Procedures    Percutaneous Tibial Nerve Stimulation     This order was created via procedure documentation    Patient ID: Sarita Morelos \"Leslie\" is a 75 y.o. female.    Percutaneous Tibial Nerve Stimulation    Date/Time: 7/22/2025 2:27 PM    Performed by: Didi Carrera, " APRN-CNP  Authorized by: PASHA Kelly    Procedure Details     Indications: urge incontinence and urinary urgency      PTNS session #: 6    Ankle used: right      Stimulator intensity (mA): 16           Rx vaginal estrogen cream apply 1 gram with finger to vaginal opening daily x 2 weeks, then 2-3 x per week    PTNS weekly x 6  Thank you!      JAREN Kelly-CNP  Lab Results   Component Value Date    GLUCOSE 92 06/25/2025    CALCIUM 9.4 06/25/2025     06/25/2025    K 4.6 06/25/2025    CO2 24 06/25/2025     06/25/2025    BUN 14 06/25/2025    CREATININE 0.85 06/25/2025

## 2025-07-29 ENCOUNTER — APPOINTMENT (OUTPATIENT)
Dept: UROLOGY | Facility: CLINIC | Age: 75
End: 2025-07-29
Payer: MEDICARE

## 2025-07-29 ENCOUNTER — APPOINTMENT (OUTPATIENT)
Dept: PHARMACY | Facility: HOSPITAL | Age: 75
End: 2025-07-29
Payer: COMMERCIAL

## 2025-07-29 DIAGNOSIS — R35.0 FREQUENCY OF URINATION: ICD-10-CM

## 2025-07-29 DIAGNOSIS — R39.15 URGENCY OF URINATION: ICD-10-CM

## 2025-07-29 DIAGNOSIS — N39.41 URGE INCONTINENCE: Primary | ICD-10-CM

## 2025-07-29 DIAGNOSIS — E66.01 MORBID OBESITY (MULTI): ICD-10-CM

## 2025-07-29 DIAGNOSIS — E11.22 TYPE 2 DIABETES MELLITUS WITH CHRONIC KIDNEY DISEASE, WITHOUT LONG-TERM CURRENT USE OF INSULIN, UNSPECIFIED CKD STAGE (MULTI): ICD-10-CM

## 2025-07-29 PROCEDURE — 64566 NEUROELTRD STIM POST TIBIAL: CPT | Performed by: NURSE PRACTITIONER

## 2025-07-29 RX ORDER — TIRZEPATIDE 7.5 MG/.5ML
7.5 INJECTION, SOLUTION SUBCUTANEOUS WEEKLY
Qty: 2 ML | Refills: 0 | Status: SHIPPED | OUTPATIENT
Start: 2025-07-29

## 2025-07-29 NOTE — PROGRESS NOTES
"07/29/25   [id number]    PTNS #7     Subjective      HPI Sarita Morelos \"Leslie\" is a 75 y.o. female who presents for right flank pain and concern for PTNS #6;     Less urgency and frequency;     discussed UTI prevention last visit, ordered Dmanose, estrogen vaginal cream sent in last visit, trial instead of vagifem tablets;       PMH, PSH, FH, SH reviewed    Last visit: Per patient medtronic worked during trial but then once implanted didn't help symptoms; Reviewed REVI information and story book this morning;     Planning for repeat cystoscopy in Nov w Dr. Mitchell    DTF every 45 min to 90 min  NTF 0 but as soon as awakens rushing with urgency to bathroom  Incontinence 4-5 pad changes per day        Objective     LMP 01/01/2002 (Approximate)    Physical Exam  General: Appears comfortable and in no apparent distress, well nourished  Head: Normocephalic, atraumatic  Neck: trachea midline  Respiratory: respirations unlabored, no wheezes, and no use of accessory muscles  Cardiovascular: at rest no dyspnea, well perfused  Skin: no visible rashes or lesions  Neurologic: grossly intact, oriented to person, place, and time  Psychiatric: mood and affect appropriate  Musculoskeletal: in chair for appt. no difficulty w upper body movement    Assessment/Plan   Problem List Items Addressed This Visit    None  Visit Diagnoses         Urge incontinence    -  Primary      Urgency of urination          Frequency of urination                          Orders Placed This Encounter   Procedures    Percutaneous Tibial Nerve Stimulation     This order was created via procedure documentation    Patient ID: Sarita Morelos \"Leslie\" is a 75 y.o. female.    Percutaneous Tibial Nerve Stimulation    Date/Time: 7/29/2025 2:18 PM    Performed by: PASHA Klely  Authorized by: PASHA Kelly    Procedure Details     Indications: urge incontinence and urinary urgency      PTNS session #: 7    Ankle used: right  "     Stimulator intensity (mA): 16           Rx vaginal estrogen cream apply 1 gram with finger to vaginal opening daily x 2 weeks, then 2-3 x per week    PTNS weekly x 5  Thank you!      Didi Carrera, APRN-CNP  Lab Results   Component Value Date    GLUCOSE 92 06/25/2025    CALCIUM 9.4 06/25/2025     06/25/2025    K 4.6 06/25/2025    CO2 24 06/25/2025     06/25/2025    BUN 14 06/25/2025    CREATININE 0.85 06/25/2025

## 2025-07-29 NOTE — PROGRESS NOTES
"Pharmacist Clinic: Weight Management    Sarita Morelos \"Leslie\" was referred to the Clinical Pharmacy Team for weight management.     Referring Provider: Marilee Hodges, *  - Last visit with referring provider: 5/27/25    HISTORY OF PRESENT ILLNESS    - Patient with baseline BMI = 40.33 referred to assist with titration of GLP-1 agonist therapy for weight loss  - Patient has a history of type 2 diabetes, well controlled with last HbA1c 5.7; she does not monitor blood sugars at home  - Previous eli en y gastric bypass 2016   - Ozempic 2 mg weekly for blood sugar control and weight loss   - Noticed moderate appetite suppression but not much change in weight       Diet:   - regular, small portions   - Salad, [place], rice, Breakfast sandwich   - Save half meals for later   - Avoids snacks- chips and soda    Exercise Routine:   - is wearing back support     Weight loss:   - Lost 70 lbs  - Current weight: 200 lb  - Baseline weight: 247 lb (11/2023)  - Goal <200 lb    Adverse effects: None reported     PHARMACY ASSESSMENT    CURRENT WEIGHT LOSS PHARMACOTHERAPY  - Ozempic 2 mg weekly      AFFORDABILITY/ACCESSIBILITY  - No issues reported    DRUG INTERACTIONS  - No significant drug-drug interactions exist that require adjustment to therapy    Pertinent PMH Review: from chart review  - PMH of Pancreatitis: No  - PMH of Retinopathy: No  - PMH of MTC: No    Allergies: Gabapentin, Nitrofurantoin, and Sulfamethoxazole-trimethoprim     [id number] - [place], [place] - [address] [place] [address]  Phone: [phone] Fax: [phone]    [facility] [place] - [facility] AT Portal to Registered [facility] Sites  [facility]  [place] [address]  Phone: [phone] Fax: [phone]    [facility]  [address], [place] [address]  Phone: [phone] Fax: [phone]      LAB  Lab Results   Component " Value Date    BILITOT 0.5 06/19/2024    CALCIUM 9.4 06/25/2025    CO2 24 06/25/2025     06/25/2025    CREATININE 0.85 06/25/2025    GLUCOSE 92 06/25/2025    ALKPHOS 140 (H) 06/19/2024    K 4.6 06/25/2025    PROT 8.0 06/19/2024     06/25/2025    AST 19 06/19/2024    ALT 18 06/19/2024    BUN 14 06/25/2025    ANIONGAP 10 06/25/2025    MG 1.80 08/01/2022    PHOS 3.6 01/28/2025    ALBUMIN 4.1 01/28/2025    LIPASE 14 09/12/2023    EGFR 72 06/25/2025     Lab Results   Component Value Date    TRIG 57 10/22/2024    CHOL 116 10/22/2024    LDLCALC 46 10/22/2024    HDL 58.2 10/22/2024     Lab Results   Component Value Date    HGBA1C 5.7 (H) 01/28/2025       Current Outpatient Medications on File Prior to Visit   Medication Sig Dispense Refill    amLODIPine (Norvasc) 10 mg tablet Take 1 tablet (10 mg) by mouth once daily. 90 tablet 1    atorvastatin (Lipitor) 40 mg tablet TAKE ONE TABLET BY MOUTH EVERY DAY AT BEDTIME 90 tablet 0    azelastine (Optivar) 0.05 % ophthalmic solution 1 drop both eyes up to 2 times a day as needed for itching/allergies 6 mL 11    cholecalciferol (Vitamin D-3) 25 MCG (1000 UT) tablet Take 1 tablet (25 mcg) by mouth once daily.      diclofenac sodium (Voltaren) 1 % gel gel APPLY 1 APPLICATION TOPICALLY 4 TIMES A DAY. 100 g 1    DULoxetine (Cymbalta) 60 mg DR capsule Take 2 capsules (120 mg) by mouth once daily. 180 capsule 1    estradiol (Estrace) 0.01 % (0.1 mg/gram) vaginal cream Apply 1 gram to vaginal opening with finger daily for 2 weeks, then 2-3 times per week. 42.5 g 5    estradiol (Vagifem) 10 mcg tablet vaginal tablet Place 1 tablet nightly for 2 weeks then 2 times a week thereafter. 24 tablet 4    ferrous sulfate 325 (65 Fe) MG tablet Take 1 tablet by mouth 1 (one) time per week.      methocarbamol (Robaxin) 500 mg tablet Take 1 tablet (500 mg) by mouth 4 times a day as needed for muscle spasms. 40 tablet 1    metoprolol tartrate (Lopressor) 75 mg tablet TAKE ONE TABLET BY MOUTH  once DAILY 90 tablet 0    multivit with minerals/lutein (MULTIVITAMIN 50 PLUS ORAL) Take 1 tablet by mouth 3 times a week.      omeprazole (PriLOSEC) 20 mg DR capsule TAKE ONE CAPSULE BY MOUTH EVERY DAY AS NEEDED FOR REFLUX 30 capsule 0    semaglutide 2 mg/dose (8 mg/3 mL) pen injector Inject 2 mg under the skin 1 (one) time per week. 9 mL 1    tirzepatide (Mounjaro) 7.5 mg/0.5 mL pen injector Inject 7.5 mg under the skin 1 (one) time per week. 2 mL 0    turmeric root extract 500 mg capsule Take 1 capsule by mouth once daily.      ubidecarenone (CO Q-10 ORAL) Take 1 tablet by mouth once daily.       No current facility-administered medications on file prior to visit.       PATIENT EDUCATION/GOALS  - Target goal 5% to 10% weight loss within 3-6 months  - Counseled patient on MOA, expectations, side effects, duration of therapy, contraindications, administration, and monitoring parameters  - Answered all patient questions and concerns; provided [facility] phone number if issues/questions arise    RECOMMENDATIONS/PLAN  Problem List Items Addressed This Visit    None        ASSESSMENT:  Patient with baseline BMI = 40.33 is on GLP-1 agonist therapy for type 2 diabetes treatment and weight loss.  Rationale for plan: Patient denies any side effects on Ozempic 2 mg.  Blood sugars well controlled.  She is reporting moderate appetite suppression and is at weight loss plateau, prior auth submitted and approved for Mounjaro for additional weight loss support.     PLAN:  SWITCH from Ozempic to Mounjaro 7.5 mg weekly at next day of dose due   Send to [facility] per patient request  Prefers 3 month supply but given potential for dose titration up or down will send 1 month for now   Education provided:   Discussed importance of maintaining adequate nutrition and hydration throughout the day to support weight management and provide fuel and energy for the body  Goal to eat small meals throughout the day and will incorporate protein  eula  Counseled patient on MOA, expectations, side effects, duration of therapy, contraindications, administration, and monitoring parameters  Answered all patient questions and concerns  Clinical Pharmacist follow up: 7/29/25 1040 am  PCP follow up: 9/9/25    Continue all meds under the continuation of care with the referring provider and clinical pharmacy team.    Annie Levy, PharmD  Clinical Pharmacy Specialist, Primary Care   [phone]    Verbal consent to manage patient's drug therapy was obtained from patient. They were informed they may decline to participate or withdraw from participation in pharmacy services at any time.

## 2025-08-05 ENCOUNTER — APPOINTMENT (OUTPATIENT)
Dept: UROLOGY | Facility: CLINIC | Age: 75
End: 2025-08-05
Payer: MEDICARE

## 2025-08-05 VITALS
HEIGHT: 61 IN | SYSTOLIC BLOOD PRESSURE: 142 MMHG | DIASTOLIC BLOOD PRESSURE: 83 MMHG | HEART RATE: 81 BPM | TEMPERATURE: 97 F | BODY MASS INDEX: 37.8 KG/M2 | WEIGHT: 200.2 LBS

## 2025-08-05 DIAGNOSIS — R39.15 URGENCY OF URINATION: ICD-10-CM

## 2025-08-05 DIAGNOSIS — N39.41 URGE INCONTINENCE: Primary | ICD-10-CM

## 2025-08-05 DIAGNOSIS — R35.0 FREQUENCY OF URINATION: ICD-10-CM

## 2025-08-05 PROCEDURE — 64566 NEUROELTRD STIM POST TIBIAL: CPT | Performed by: NURSE PRACTITIONER

## 2025-08-05 NOTE — PROGRESS NOTES
"08/05/25   [id number]    PTNS #8     Subjective      HPI Sarita Morelos \"Leslie\" is a 75 y.o. female who presents for PTNS #8; happy that despite drinking extra fluids, no longer up at night. Happy with results.    Less urgency and frequency;    PMH, PSH, FH, SH reviewed    Last visit: Per patient medtronic worked during trial but then once implanted didn't help symptoms; Reviewed REVI information and story book this morning;     Planning for repeat cystoscopy in Nov w Dr. Mitchell    DTF every 45 min to 90 min  NTF 0 but as soon as awakens rushing with urgency to bathroom  Incontinence 4-5 pad changes per day        Objective     /83   Pulse 81   Temp 36.1 °C (97 °F)   Ht (!) 1.549 m (5' 1\")   Wt 90.8 kg (200 lb 3.2 oz)   LMP 01/01/2002 (Approximate)   BMI 37.83 kg/m²    Physical Exam  General: Appears comfortable and in no apparent distress, well nourished  Head: Normocephalic, atraumatic  Neck: trachea midline  Respiratory: respirations unlabored, no wheezes, and no use of accessory muscles  Cardiovascular: at rest no dyspnea, well perfused  Skin: no visible rashes or lesions  Neurologic: grossly intact, oriented to person, place, and time  Psychiatric: mood and affect appropriate  Musculoskeletal: in chair for appt. no difficulty w upper body movement    Assessment/Plan   Problem List Items Addressed This Visit    None  Visit Diagnoses         Urge incontinence    -  Primary      Urgency of urination          Frequency of urination                            Orders Placed This Encounter   Procedures    Percutaneous Tibial Nerve Stimulation     This order was created via procedure documentation    Patient ID: Sarita Morelos \"Leslie\" is a 75 y.o. female.    Percutaneous Tibial Nerve Stimulation    Date/Time: 8/5/2025 2:05 PM    Performed by: PASHA Kelly  Authorized by: PASHA Kelly    Procedure Details     Indications: urge incontinence and urinary urgency      " PTNS session #: 8    Ankle used: right      Stimulator intensity (mA): 3           Rx vaginal estrogen cream apply 1 gram with finger to vaginal opening daily x 2 weeks, then 2-3 x per week    PTNS weekly x 4  Thank you!      Didi Carrera, APRN-CNP  Lab Results   Component Value Date    GLUCOSE 92 06/25/2025    CALCIUM 9.4 06/25/2025     06/25/2025    K 4.6 06/25/2025    CO2 24 06/25/2025     06/25/2025    BUN 14 06/25/2025    CREATININE 0.85 06/25/2025

## 2025-08-12 ENCOUNTER — APPOINTMENT (OUTPATIENT)
Dept: UROLOGY | Facility: CLINIC | Age: 75
End: 2025-08-12
Payer: COMMERCIAL

## 2025-08-12 ENCOUNTER — APPOINTMENT (OUTPATIENT)
Dept: BEHAVIORAL HEALTH | Facility: CLINIC | Age: 75
End: 2025-08-12
Payer: MEDICARE

## 2025-08-12 VITALS
BODY MASS INDEX: 37.25 KG/M2 | WEIGHT: 197.3 LBS | HEIGHT: 61 IN | SYSTOLIC BLOOD PRESSURE: 131 MMHG | TEMPERATURE: 98.2 F | DIASTOLIC BLOOD PRESSURE: 80 MMHG | HEART RATE: 83 BPM

## 2025-08-12 VITALS
RESPIRATION RATE: 18 BRPM | BODY MASS INDEX: 37.28 KG/M2 | HEART RATE: 76 BPM | DIASTOLIC BLOOD PRESSURE: 75 MMHG | SYSTOLIC BLOOD PRESSURE: 124 MMHG | TEMPERATURE: 97.7 F | WEIGHT: 197.3 LBS

## 2025-08-12 DIAGNOSIS — F41.8 MIXED ANXIETY AND DEPRESSIVE DISORDER: ICD-10-CM

## 2025-08-12 DIAGNOSIS — R39.15 URGENCY OF URINATION: ICD-10-CM

## 2025-08-12 DIAGNOSIS — R35.0 FREQUENCY OF URINATION: ICD-10-CM

## 2025-08-12 DIAGNOSIS — F32.A DEPRESSION, UNSPECIFIED DEPRESSION TYPE: ICD-10-CM

## 2025-08-12 DIAGNOSIS — N39.41 URGE INCONTINENCE: Primary | ICD-10-CM

## 2025-08-12 PROCEDURE — 1159F MED LIST DOCD IN RCRD: CPT | Performed by: PSYCHIATRY & NEUROLOGY

## 2025-08-12 PROCEDURE — 3074F SYST BP LT 130 MM HG: CPT | Performed by: PSYCHIATRY & NEUROLOGY

## 2025-08-12 PROCEDURE — 1160F RVW MEDS BY RX/DR IN RCRD: CPT | Performed by: PSYCHIATRY & NEUROLOGY

## 2025-08-12 PROCEDURE — 1125F AMNT PAIN NOTED PAIN PRSNT: CPT | Performed by: PSYCHIATRY & NEUROLOGY

## 2025-08-12 PROCEDURE — 99214 OFFICE O/P EST MOD 30 MIN: CPT | Performed by: PSYCHIATRY & NEUROLOGY

## 2025-08-12 PROCEDURE — 3078F DIAST BP <80 MM HG: CPT | Performed by: PSYCHIATRY & NEUROLOGY

## 2025-08-12 PROCEDURE — 64566 NEUROELTRD STIM POST TIBIAL: CPT | Performed by: NURSE PRACTITIONER

## 2025-08-12 PROCEDURE — 1036F TOBACCO NON-USER: CPT | Performed by: PSYCHIATRY & NEUROLOGY

## 2025-08-12 PROCEDURE — 99214 OFFICE O/P EST MOD 30 MIN: CPT | Mod: AM | Performed by: PSYCHIATRY & NEUROLOGY

## 2025-08-12 RX ORDER — DULOXETIN HYDROCHLORIDE 60 MG/1
120 CAPSULE, DELAYED RELEASE ORAL DAILY
Qty: 180 CAPSULE | Refills: 1 | Status: SHIPPED | OUTPATIENT
Start: 2025-08-12 | End: 2026-02-08

## 2025-08-12 ASSESSMENT — ENCOUNTER SYMPTOMS
OCCASIONAL FEELINGS OF UNSTEADINESS: 1
LOSS OF SENSATION IN FEET: 1

## 2025-08-12 ASSESSMENT — PAIN SCALES - GENERAL: PAINLEVEL_OUTOF10: 4

## 2025-08-17 ASSESSMENT — ENCOUNTER SYMPTOMS
ABDOMINAL PAIN: 1
DYSURIA: 0
DIARRHEA: 0
CONSTIPATION: 1
WEIGHT LOSS: 1
FREQUENCY: 1
HEMATURIA: 0
HEMATOCHEZIA: 0
HEADACHES: 0
BELCHING: 1
FLATUS: 0
MYALGIAS: 1
FEVER: 0
ARTHRALGIAS: 1
VOMITING: 0
ANOREXIA: 0
NAUSEA: 0

## 2025-08-18 ENCOUNTER — APPOINTMENT (OUTPATIENT)
Dept: PRIMARY CARE | Facility: CLINIC | Age: 75
End: 2025-08-18
Payer: COMMERCIAL

## 2025-08-18 VITALS
HEIGHT: 61 IN | WEIGHT: 193 LBS | DIASTOLIC BLOOD PRESSURE: 77 MMHG | HEART RATE: 75 BPM | BODY MASS INDEX: 36.44 KG/M2 | SYSTOLIC BLOOD PRESSURE: 118 MMHG

## 2025-08-18 DIAGNOSIS — I47.10 SUPRAVENTRICULAR TACHYCARDIA: ICD-10-CM

## 2025-08-18 DIAGNOSIS — K21.9 GASTRIC REFLUX: ICD-10-CM

## 2025-08-18 DIAGNOSIS — Z98.84 GASTRIC BYPASS STATUS FOR OBESITY: Primary | ICD-10-CM

## 2025-08-18 DIAGNOSIS — R10.13 EPIGASTRIC ABDOMINAL PAIN: ICD-10-CM

## 2025-08-18 DIAGNOSIS — G95.9 CERVICAL MYELOPATHY: ICD-10-CM

## 2025-08-18 PROCEDURE — 3008F BODY MASS INDEX DOCD: CPT | Performed by: INTERNAL MEDICINE

## 2025-08-18 PROCEDURE — 93000 ELECTROCARDIOGRAM COMPLETE: CPT | Performed by: INTERNAL MEDICINE

## 2025-08-18 PROCEDURE — 3078F DIAST BP <80 MM HG: CPT | Performed by: INTERNAL MEDICINE

## 2025-08-18 PROCEDURE — 1036F TOBACCO NON-USER: CPT | Performed by: INTERNAL MEDICINE

## 2025-08-18 PROCEDURE — 3074F SYST BP LT 130 MM HG: CPT | Performed by: INTERNAL MEDICINE

## 2025-08-18 PROCEDURE — 99417 PROLNG OP E/M EACH 15 MIN: CPT | Performed by: INTERNAL MEDICINE

## 2025-08-18 PROCEDURE — 1159F MED LIST DOCD IN RCRD: CPT | Performed by: INTERNAL MEDICINE

## 2025-08-18 PROCEDURE — 99215 OFFICE O/P EST HI 40 MIN: CPT | Performed by: INTERNAL MEDICINE

## 2025-08-18 RX ORDER — OMEPRAZOLE 20 MG/1
40 CAPSULE, DELAYED RELEASE ORAL
Qty: 180 CAPSULE | Refills: 1 | Status: SHIPPED | OUTPATIENT
Start: 2025-08-18

## 2025-08-18 ASSESSMENT — ENCOUNTER SYMPTOMS
HEMATOCHEZIA: 0
NAUSEA: 0
HEADACHES: 0
CONSTIPATION: 1
FREQUENCY: 1
OCCASIONAL FEELINGS OF UNSTEADINESS: 1
FLATUS: 0
ANOREXIA: 0
HEMATURIA: 0
DEPRESSION: 1
DYSURIA: 0
WEIGHT LOSS: 1
VOMITING: 0
ABDOMINAL PAIN: 1
MYALGIAS: 1
DIARRHEA: 0
LOSS OF SENSATION IN FEET: 1
BELCHING: 1
FEVER: 0

## 2025-08-18 ASSESSMENT — PATIENT HEALTH QUESTIONNAIRE - PHQ9: 2. FEELING DOWN, DEPRESSED OR HOPELESS: NOT AT ALL

## 2025-08-19 ENCOUNTER — APPOINTMENT (OUTPATIENT)
Dept: UROLOGY | Facility: CLINIC | Age: 75
End: 2025-08-19
Payer: MEDICARE

## 2025-08-20 ENCOUNTER — APPOINTMENT (OUTPATIENT)
Dept: UROLOGY | Facility: CLINIC | Age: 75
End: 2025-08-20
Payer: COMMERCIAL

## 2025-08-20 LAB
ALBUMIN SERPL-MCNC: 4 G/DL (ref 3.6–5.1)
ALP SERPL-CCNC: 116 U/L (ref 37–153)
ALT SERPL-CCNC: 23 U/L (ref 6–29)
ANION GAP SERPL CALCULATED.4IONS-SCNC: 10 MMOL/L (CALC) (ref 7–17)
AST SERPL-CCNC: 28 U/L (ref 10–35)
BASOPHILS # BLD AUTO: 28 CELLS/UL (ref 0–200)
BASOPHILS NFR BLD AUTO: 0.4 %
BILIRUB SERPL-MCNC: 0.5 MG/DL (ref 0.2–1.2)
BUN SERPL-MCNC: 16 MG/DL (ref 7–25)
CALCIUM SERPL-MCNC: 9.4 MG/DL (ref 8.6–10.4)
CHLORIDE SERPL-SCNC: 106 MMOL/L (ref 98–110)
CO2 SERPL-SCNC: 25 MMOL/L (ref 20–32)
CREAT SERPL-MCNC: 0.8 MG/DL (ref 0.6–1)
EGFRCR SERPLBLD CKD-EPI 2021: 78 ML/MIN/1.73M2
EOSINOPHIL # BLD AUTO: 99 CELLS/UL (ref 15–500)
EOSINOPHIL NFR BLD AUTO: 1.4 %
ERYTHROCYTE [DISTWIDTH] IN BLOOD BY AUTOMATED COUNT: 14.3 % (ref 11–15)
GLUCOSE SERPL-MCNC: 90 MG/DL (ref 65–99)
HCT VFR BLD AUTO: 37.7 % (ref 35–45)
HGB BLD-MCNC: 11.9 G/DL (ref 11.7–15.5)
LIPASE SERPL-CCNC: 23 U/L (ref 7–60)
LYMPHOCYTES # BLD AUTO: 2102 CELLS/UL (ref 850–3900)
LYMPHOCYTES NFR BLD AUTO: 29.6 %
MCH RBC QN AUTO: 27.8 PG (ref 27–33)
MCHC RBC AUTO-ENTMCNC: 31.6 G/DL (ref 32–36)
MCV RBC AUTO: 88.1 FL (ref 80–100)
MONOCYTES # BLD AUTO: 412 CELLS/UL (ref 200–950)
MONOCYTES NFR BLD AUTO: 5.8 %
NEUTROPHILS # BLD AUTO: 4459 CELLS/UL (ref 1500–7800)
NEUTROPHILS NFR BLD AUTO: 62.8 %
PLATELET # BLD AUTO: 286 THOUSAND/UL (ref 140–400)
PMV BLD REES-ECKER: 10.3 FL (ref 7.5–12.5)
POTASSIUM SERPL-SCNC: 4.1 MMOL/L (ref 3.5–5.3)
PROT SERPL-MCNC: 7.8 G/DL (ref 6.1–8.1)
RBC # BLD AUTO: 4.28 MILLION/UL (ref 3.8–5.1)
SODIUM SERPL-SCNC: 141 MMOL/L (ref 135–146)
WBC # BLD AUTO: 7.1 THOUSAND/UL (ref 3.8–10.8)

## 2025-08-21 ENCOUNTER — HOSPITAL ENCOUNTER (OUTPATIENT)
Dept: RADIOLOGY | Facility: CLINIC | Age: 75
Discharge: HOME | End: 2025-08-21
Payer: MEDICARE

## 2025-08-21 ENCOUNTER — PROCEDURE VISIT (OUTPATIENT)
Dept: UROLOGY | Facility: CLINIC | Age: 75
End: 2025-08-21
Payer: COMMERCIAL

## 2025-08-21 ENCOUNTER — TELEPHONE (OUTPATIENT)
Dept: UROLOGY | Facility: CLINIC | Age: 75
End: 2025-08-21
Payer: MEDICARE

## 2025-08-21 VITALS
HEART RATE: 79 BPM | HEIGHT: 61 IN | SYSTOLIC BLOOD PRESSURE: 124 MMHG | DIASTOLIC BLOOD PRESSURE: 72 MMHG | WEIGHT: 196.2 LBS | TEMPERATURE: 97.3 F | BODY MASS INDEX: 37.04 KG/M2

## 2025-08-21 DIAGNOSIS — R35.0 FREQUENCY OF URINATION: ICD-10-CM

## 2025-08-21 DIAGNOSIS — R10.13 EPIGASTRIC ABDOMINAL PAIN: ICD-10-CM

## 2025-08-21 DIAGNOSIS — N39.41 URGE INCONTINENCE: Primary | ICD-10-CM

## 2025-08-21 DIAGNOSIS — R39.15 URGENCY OF URINATION: ICD-10-CM

## 2025-08-21 PROCEDURE — 2550000001 HC RX 255 CONTRASTS

## 2025-08-21 PROCEDURE — 74177 CT ABD & PELVIS W/CONTRAST: CPT | Performed by: RADIOLOGY

## 2025-08-21 PROCEDURE — 64566 NEUROELTRD STIM POST TIBIAL: CPT | Performed by: NURSE PRACTITIONER

## 2025-08-21 PROCEDURE — 74177 CT ABD & PELVIS W/CONTRAST: CPT

## 2025-08-21 RX ADMIN — IOHEXOL 75 ML: 350 INJECTION, SOLUTION INTRAVENOUS at 11:44

## 2025-08-26 ENCOUNTER — APPOINTMENT (OUTPATIENT)
Dept: UROLOGY | Facility: CLINIC | Age: 75
End: 2025-08-26
Payer: MEDICARE

## 2025-08-26 DIAGNOSIS — R39.15 URGENCY OF URINATION: ICD-10-CM

## 2025-08-26 DIAGNOSIS — N39.41 URGE INCONTINENCE: Primary | ICD-10-CM

## 2025-08-26 DIAGNOSIS — R35.0 FREQUENCY OF URINATION: ICD-10-CM

## 2025-08-26 PROCEDURE — 64566 NEUROELTRD STIM POST TIBIAL: CPT | Performed by: NURSE PRACTITIONER

## 2025-08-27 ENCOUNTER — APPOINTMENT (OUTPATIENT)
Dept: PHARMACY | Facility: HOSPITAL | Age: 75
End: 2025-08-27
Payer: MEDICARE

## 2025-08-27 DIAGNOSIS — E11.22 TYPE 2 DIABETES MELLITUS WITH CHRONIC KIDNEY DISEASE, WITHOUT LONG-TERM CURRENT USE OF INSULIN, UNSPECIFIED CKD STAGE (MULTI): ICD-10-CM

## 2025-08-27 DIAGNOSIS — E66.01 MORBID OBESITY (MULTI): ICD-10-CM

## 2025-09-02 ENCOUNTER — APPOINTMENT (OUTPATIENT)
Dept: UROLOGY | Facility: CLINIC | Age: 75
End: 2025-09-02
Payer: MEDICARE

## 2025-09-09 ENCOUNTER — APPOINTMENT (OUTPATIENT)
Dept: PRIMARY CARE | Facility: CLINIC | Age: 75
End: 2025-09-09
Payer: MEDICARE

## 2025-09-24 ENCOUNTER — APPOINTMENT (OUTPATIENT)
Dept: PHARMACY | Facility: HOSPITAL | Age: 75
End: 2025-09-24
Payer: MEDICARE

## 2025-10-02 ENCOUNTER — APPOINTMENT (OUTPATIENT)
Dept: UROLOGY | Facility: CLINIC | Age: 75
End: 2025-10-02
Payer: MEDICARE

## 2025-10-07 ENCOUNTER — APPOINTMENT (OUTPATIENT)
Dept: UROLOGY | Facility: CLINIC | Age: 75
End: 2025-10-07
Payer: MEDICARE

## 2025-10-08 ENCOUNTER — APPOINTMENT (OUTPATIENT)
Dept: PRIMARY CARE | Facility: CLINIC | Age: 75
End: 2025-10-08
Payer: MEDICARE

## 2025-11-05 ENCOUNTER — APPOINTMENT (OUTPATIENT)
Dept: BEHAVIORAL HEALTH | Facility: CLINIC | Age: 75
End: 2025-11-05
Payer: MEDICARE

## 2025-11-06 ENCOUNTER — APPOINTMENT (OUTPATIENT)
Dept: UROLOGY | Facility: CLINIC | Age: 75
End: 2025-11-06
Payer: MEDICARE

## 2025-11-11 ENCOUNTER — APPOINTMENT (OUTPATIENT)
Dept: UROLOGY | Facility: CLINIC | Age: 75
End: 2025-11-11
Payer: MEDICARE

## 2025-12-08 ENCOUNTER — APPOINTMENT (OUTPATIENT)
Dept: OPHTHALMOLOGY | Facility: CLINIC | Age: 75
End: 2025-12-08
Payer: MEDICARE

## 2025-12-09 ENCOUNTER — APPOINTMENT (OUTPATIENT)
Dept: UROLOGY | Facility: CLINIC | Age: 75
End: 2025-12-09
Payer: MEDICARE

## 2026-01-08 ENCOUNTER — APPOINTMENT (OUTPATIENT)
Dept: UROLOGY | Facility: CLINIC | Age: 76
End: 2026-01-08
Payer: MEDICARE

## 2026-02-05 ENCOUNTER — APPOINTMENT (OUTPATIENT)
Dept: UROLOGY | Facility: CLINIC | Age: 76
End: 2026-02-05
Payer: MEDICARE

## 2026-03-03 ENCOUNTER — APPOINTMENT (OUTPATIENT)
Dept: UROLOGY | Facility: CLINIC | Age: 76
End: 2026-03-03
Payer: MEDICARE

## (undated) DEVICE — PAD, SANITARY, OBSTETRICAL, W/ADHSV STRIP,11 IN,LF

## (undated) DEVICE — DRESSING, ADHESIVE, ISLAND, TELFA, 2 X 3.75 IN, LF

## (undated) DEVICE — DRAPE, SHEET, ENDOSCOPY, GENERAL, FENESTRATED, ARMBOARD COVER, 98 X 123.5 IN, DISPOSABLE, LF, STERILE

## (undated) DEVICE — NEEDLE, INJECTOR, FLEX CYSTO, SIINGLE USE

## (undated) DEVICE — DRESSING, SPONGE, GAUZE, CURITY, 4 X 4 IN, STERILE

## (undated) DEVICE — SUTURE, POLYSORB, 4-0, 18 IN, P12, UNDYED

## (undated) DEVICE — SOLUTION, SCRUB EXIDINE, 4% CHG, 4 OZ

## (undated) DEVICE — Device

## (undated) DEVICE — DRESSING, GAUZE, DRAIN SPONGE, 6 PLY, EXCILON, 4 X 4 IN, STERILE

## (undated) DEVICE — BRIEF, CURITY, XLARGE, MESH

## (undated) DEVICE — DRAPE COVER, C ARM, FLOUROSCAN IMAGING SYS

## (undated) DEVICE — DRESSING, ISLAND, TELFA, 4 X 5 IN

## (undated) DEVICE — COVER, C-ARM W/CLIPS, OEC GE

## (undated) DEVICE — SUTURE, VICRYL, 2-0, 27 IN, SH, UNDYED

## (undated) DEVICE — ADHESIVE, SKIN, LIQUIBAND EXCEED

## (undated) DEVICE — GOWN, SURGICAL, SMARTGOWN, XX-LARGE, STERILE

## (undated) DEVICE — SUTURE, SILK, 2-0, 30 IN, SH, BLACK

## (undated) DEVICE — GLOVE, SURGICAL, PROTEXIS PI , 7.5, PF, LF

## (undated) DEVICE — GOWN, SMARTSLEEVE, XXL, X-LONG

## (undated) DEVICE — TRAY, MINOR, SINGLE BASIN, STERILE

## (undated) DEVICE — STRIP, SKIN CLOSURE, STERI STRIP, REINFORCED, 0.5 X 4 IN

## (undated) DEVICE — SOLUTION, IRRIGATION, STERILE WATER, 1000 ML, POUR BOTTLE

## (undated) DEVICE — DRAPE, C-ARM IMAGE

## (undated) DEVICE — DRAPE, SHEET, 17 X 23 IN